# Patient Record
Sex: MALE | Race: WHITE | NOT HISPANIC OR LATINO | Employment: FULL TIME | URBAN - METROPOLITAN AREA
[De-identification: names, ages, dates, MRNs, and addresses within clinical notes are randomized per-mention and may not be internally consistent; named-entity substitution may affect disease eponyms.]

---

## 2017-03-02 ENCOUNTER — TRANSCRIBE ORDERS (OUTPATIENT)
Dept: ADMINISTRATIVE | Facility: HOSPITAL | Age: 47
End: 2017-03-02

## 2017-03-02 ENCOUNTER — APPOINTMENT (OUTPATIENT)
Dept: LAB | Facility: MEDICAL CENTER | Age: 47
End: 2017-03-02
Payer: COMMERCIAL

## 2017-03-02 DIAGNOSIS — I25.10 ATHEROSCLEROSIS OF NATIVE CORONARY ARTERY, ANGINA PRESENCE UNSPECIFIED, UNSPECIFIED WHETHER NATIVE OR TRANSPLANTED HEART: ICD-10-CM

## 2017-03-02 DIAGNOSIS — E78.5 HYPERLIPIDEMIA: ICD-10-CM

## 2017-03-02 DIAGNOSIS — I25.10 ATHEROSCLEROSIS OF NATIVE CORONARY ARTERY, ANGINA PRESENCE UNSPECIFIED, UNSPECIFIED WHETHER NATIVE OR TRANSPLANTED HEART: Primary | ICD-10-CM

## 2017-03-02 DIAGNOSIS — R00.2 PALPITATIONS: ICD-10-CM

## 2017-03-02 DIAGNOSIS — I25.10 ATHEROSCLEROTIC HEART DISEASE OF NATIVE CORONARY ARTERY WITHOUT ANGINA PECTORIS: ICD-10-CM

## 2017-03-02 LAB
ALBUMIN SERPL BCP-MCNC: 4 G/DL (ref 3.5–5)
ALP SERPL-CCNC: 94 U/L (ref 46–116)
ALT SERPL W P-5'-P-CCNC: 45 U/L (ref 12–78)
ANION GAP SERPL CALCULATED.3IONS-SCNC: 6 MMOL/L (ref 4–13)
AST SERPL W P-5'-P-CCNC: 9 U/L (ref 5–45)
BILIRUB SERPL-MCNC: 0.8 MG/DL (ref 0.2–1)
BUN SERPL-MCNC: 21 MG/DL (ref 5–25)
CALCIUM SERPL-MCNC: 8.7 MG/DL (ref 8.3–10.1)
CHLORIDE SERPL-SCNC: 101 MMOL/L (ref 100–108)
CHOLEST SERPL-MCNC: 173 MG/DL (ref 50–200)
CO2 SERPL-SCNC: 29 MMOL/L (ref 21–32)
CREAT SERPL-MCNC: 1.07 MG/DL (ref 0.6–1.3)
GFR SERPL CREATININE-BSD FRML MDRD: >60 ML/MIN/1.73SQ M
GLUCOSE SERPL-MCNC: 271 MG/DL (ref 65–140)
HDLC SERPL-MCNC: 51 MG/DL (ref 40–60)
LDLC SERPL CALC-MCNC: 90 MG/DL (ref 0–100)
POTASSIUM SERPL-SCNC: 4.4 MMOL/L (ref 3.5–5.3)
PROT SERPL-MCNC: 7.6 G/DL (ref 6.4–8.2)
SODIUM SERPL-SCNC: 136 MMOL/L (ref 136–145)
TRIGL SERPL-MCNC: 162 MG/DL

## 2017-03-02 PROCEDURE — 80053 COMPREHEN METABOLIC PANEL: CPT

## 2017-03-02 PROCEDURE — 80061 LIPID PANEL: CPT

## 2017-03-02 PROCEDURE — 36415 COLL VENOUS BLD VENIPUNCTURE: CPT

## 2017-03-17 ENCOUNTER — TRANSCRIBE ORDERS (OUTPATIENT)
Dept: ADMINISTRATIVE | Facility: HOSPITAL | Age: 47
End: 2017-03-17

## 2017-03-17 ENCOUNTER — ALLSCRIPTS OFFICE VISIT (OUTPATIENT)
Dept: OTHER | Facility: OTHER | Age: 47
End: 2017-03-17

## 2017-03-17 DIAGNOSIS — E78.5 HYPERLIPIDEMIA: ICD-10-CM

## 2017-05-01 ENCOUNTER — HOSPITAL ENCOUNTER (OUTPATIENT)
Dept: NON INVASIVE DIAGNOSTICS | Facility: HOSPITAL | Age: 47
Discharge: HOME | End: 2017-05-01
Attending: INTERNAL MEDICINE
Payer: COMMERCIAL

## 2017-05-01 DIAGNOSIS — E78.5 HYPERLIPIDEMIA: ICD-10-CM

## 2017-05-01 PROCEDURE — 93880 EXTRACRANIAL BILAT STUDY: CPT

## 2018-01-13 VITALS
WEIGHT: 244 LBS | DIASTOLIC BLOOD PRESSURE: 80 MMHG | HEIGHT: 74 IN | RESPIRATION RATE: 16 BRPM | BODY MASS INDEX: 31.32 KG/M2 | HEART RATE: 81 BPM | SYSTOLIC BLOOD PRESSURE: 126 MMHG

## 2018-02-02 DIAGNOSIS — I15.8 OTHER SECONDARY HYPERTENSION: ICD-10-CM

## 2018-02-02 DIAGNOSIS — I50.9 CONGESTIVE HEART FAILURE, UNSPECIFIED CONGESTIVE HEART FAILURE CHRONICITY, UNSPECIFIED CONGESTIVE HEART FAILURE TYPE: ICD-10-CM

## 2018-02-02 DIAGNOSIS — E78.2 MIXED HYPERLIPIDEMIA: Primary | ICD-10-CM

## 2018-02-02 RX ORDER — CLOPIDOGREL BISULFATE 75 MG/1
75 TABLET ORAL DAILY
Qty: 90 TABLET | Refills: 0 | Status: SHIPPED | OUTPATIENT
Start: 2018-02-02 | End: 2018-05-31 | Stop reason: SDUPTHER

## 2018-02-02 RX ORDER — CLOPIDOGREL BISULFATE 75 MG/1
1 TABLET ORAL DAILY
COMMUNITY
Start: 2017-08-24 | End: 2018-02-02 | Stop reason: SDUPTHER

## 2018-02-02 RX ORDER — LISINOPRIL 5 MG/1
1 TABLET ORAL DAILY
COMMUNITY
Start: 2017-08-23 | End: 2018-02-02 | Stop reason: SDUPTHER

## 2018-02-02 RX ORDER — ATORVASTATIN CALCIUM 40 MG/1
40 TABLET, FILM COATED ORAL DAILY
Qty: 90 TABLET | Refills: 2 | Status: SHIPPED | OUTPATIENT
Start: 2018-02-02 | End: 2019-01-11 | Stop reason: SDUPTHER

## 2018-02-02 RX ORDER — ATORVASTATIN CALCIUM 40 MG/1
1 TABLET, FILM COATED ORAL DAILY
COMMUNITY
Start: 2017-08-23 | End: 2018-02-02 | Stop reason: SDUPTHER

## 2018-02-02 RX ORDER — LISINOPRIL 5 MG/1
5 TABLET ORAL DAILY
Qty: 90 TABLET | Refills: 3 | Status: SHIPPED | OUTPATIENT
Start: 2018-02-02 | End: 2019-03-29 | Stop reason: SDUPTHER

## 2018-02-21 DIAGNOSIS — I10 ESSENTIAL HYPERTENSION: Primary | ICD-10-CM

## 2018-02-21 RX ORDER — METOPROLOL SUCCINATE 25 MG/1
1 TABLET, EXTENDED RELEASE ORAL DAILY
COMMUNITY
Start: 2017-08-23 | End: 2018-02-21 | Stop reason: SDUPTHER

## 2018-02-21 RX ORDER — METOPROLOL SUCCINATE 25 MG/1
25 TABLET, EXTENDED RELEASE ORAL DAILY
Qty: 90 TABLET | Refills: 2 | Status: SHIPPED | OUTPATIENT
Start: 2018-02-21 | End: 2019-01-12 | Stop reason: SDUPTHER

## 2018-03-06 ENCOUNTER — OFFICE VISIT (OUTPATIENT)
Dept: CARDIOLOGY CLINIC | Facility: MEDICAL CENTER | Age: 48
End: 2018-03-06
Payer: COMMERCIAL

## 2018-03-06 VITALS
BODY MASS INDEX: 28.73 KG/M2 | SYSTOLIC BLOOD PRESSURE: 118 MMHG | HEIGHT: 74 IN | WEIGHT: 223.9 LBS | HEART RATE: 68 BPM | OXYGEN SATURATION: 98 % | DIASTOLIC BLOOD PRESSURE: 66 MMHG

## 2018-03-06 DIAGNOSIS — I10 ESSENTIAL HYPERTENSION: ICD-10-CM

## 2018-03-06 DIAGNOSIS — I25.10 CORONARY ARTERY DISEASE INVOLVING NATIVE CORONARY ARTERY OF NATIVE HEART WITHOUT ANGINA PECTORIS: Primary | ICD-10-CM

## 2018-03-06 PROCEDURE — 99214 OFFICE O/P EST MOD 30 MIN: CPT | Performed by: INTERNAL MEDICINE

## 2018-03-06 NOTE — PATIENT INSTRUCTIONS
Recommendations:  1  Continue current medications  2  Check fasting lipid panel and complete metabolic profile  3  Follow up in one year

## 2018-03-06 NOTE — PROGRESS NOTES
Cardiology   [facility] 52 y o  male MRN: [id number]        Impression:  1  Non ST elevation MI with PCI to Diagonal branch - doing well  2  Dyslipidemia - on statin  3  Palpitations - resolved  Recommendations:  1  Continue current medications  2  Check fasting lipid panel and complete metabolic profile  3  Follow up in one year  HPI: [facility] is a 52y o  year old male with h/o myocardial infarction presents for follow up  No chest pain or shortness of breath  No further palpitations  Has been feeling well  Review of Systems   Constitutional: Negative  HENT: Negative  Eyes: Negative  Respiratory: Negative for chest tightness and shortness of breath  Cardiovascular: Negative for chest pain, palpitations and leg swelling  Gastrointestinal: Negative  Endocrine: Negative  Genitourinary: Negative  Musculoskeletal: Negative  Skin: Negative  Allergic/Immunologic: Negative  Neurological: Negative  Hematological: Negative  Psychiatric/Behavioral: Negative  All other systems reviewed and are negative  Past Medical History:   Diagnosis Date    Chest pain     History of cardiac cath      Past Surgical History:   Procedure Laterality Date    NOSE SURGERY       History   Alcohol Use    Yes     History   Drug Use No     History   Smoking Status    Never Smoker   Smokeless Tobacco    Current User     Family History   Problem Relation Age of Onset    Coronary artery disease Father     Cancer Sister        Allergies:   Allergies   Allergen Reactions    Penicillins        Medications:     Current Outpatient Prescriptions:     aspirin 81 MG tablet, Take 1 tablet by mouth daily, Disp: , Rfl:     atorvastatin (LIPITOR) 40 mg tablet, Take 1 tablet (40 mg total) by mouth daily, Disp: 90 tablet, Rfl: 2    clopidogrel (PLAVIX) 75 mg tablet, Take 1 tablet (75 mg total) by mouth daily, Disp: 90 tablet, Rfl: 0    lisinopril (ZESTRIL) 5 mg tablet, Take 1 tablet (5 mg total) by mouth daily, Disp: 90 tablet, Rfl: 3    metoprolol succinate (TOPROL-XL) 25 mg 24 hr tablet, Take 1 tablet (25 mg total) by mouth daily, Disp: 90 tablet, Rfl: 2      Wt Readings from Last 3 Encounters:   03/06/18 102 kg (223 lb 14 4 oz)   03/17/17 111 kg (244 lb)   07/11/16 111 kg (244 lb 2 oz)     Temp Readings from Last 3 Encounters:   03/23/15 (!) 97 3 °F (36 3 °C)     BP Readings from Last 3 Encounters:   03/06/18 118/66   03/17/17 126/80   07/11/16 120/80     Pulse Readings from Last 3 Encounters:   03/06/18 68   03/17/17 81   07/11/16 58         Physical Exam   Constitutional: He is oriented to person, place, and time  He appears well-developed  HENT:   Head: Normocephalic  Eyes: EOM are normal    Neck: Normal range of motion  Cardiovascular: Normal rate and regular rhythm  Exam reveals no gallop and no friction rub  No murmur heard  Pulmonary/Chest: Effort normal and breath sounds normal  No respiratory distress  He has no wheezes  He has no rales  Abdominal: Soft  Musculoskeletal: Normal range of motion  Neurological: He is alert and oriented to person, place, and time  Skin: Skin is warm and dry  Psychiatric: He has a normal mood and affect           Laboratory Studies:  CMP:  Lab Results   Component Value Date     03/02/2017    K 4 4 03/02/2017     03/02/2017    CO2 29 03/02/2017    ANIONGAP 6 03/02/2017    BUN 21 03/02/2017    CREATININE 1 07 03/02/2017    GLUCOSE 271 (H) 03/02/2017    AST 9 03/02/2017    ALT 45 03/02/2017    BILITOT 0 80 03/02/2017    EGFR >60 0 03/02/2017       Lipid Profile:   Lab Results   Component Value Date    CHOL 173 03/02/2017     Lab Results   Component Value Date    HDL 51 03/02/2017     Lab Results   Component Value Date    LDLCALC 90 03/02/2017     Lab Results   Component Value Date    TRIG 162 (H) 03/02/2017       Cardiac testing:     Results for orders placed in visit on 05/29/14   Echo complete with contrast if indicated    Narrative [address]   Phone: [phone]   Transthoracic Echocardiogram   2D, M-MODE, DOPPLER, AND COLOR DOPPLER   Study date:  29-May-2014   Patient: Tom Herr   MR number: [id number]   Account number: [de-identified]   : 1970   Age: 37 years   Gender: Male   Status: Outpatient   Location: [place] [address]    Height: 74 in   Weight: 219 6 lb   BP: 120/ 75 mmHg   Diagnoses: 414 00 - COR ATH UNSP VSL NTV/GFT   Sonographer:  Naila Mills BS, RDCS, RVT, RDMS   Primary Physician:  Yanet Mccann DO   Referring Physician:  Bette Shirley MD   Group:  [facility]   Interpreting Physician:  Noemi Levin MD   SUMMARY   LEFT VENTRICLE:   Systolic function was normal  Ejection fraction was estimated to be 60 %  There were no regional wall motion abnormalities  Wall thickness was increased  Mass was normal  The changes were consistent with concentric remodeling   (increased wall thickness with normal wall mass)  Left ventricular diastolic function parameters were normal    HISTORY: PRIOR HISTORY: DM, NSTEMI  S/P cardiac cath with coronary artery   stents 2014  PROCEDURE: The study was performed at the [place] [address]  This was a   routine study  The transthoracic approach was used  The study included    complete   2D imaging, M-mode, complete spectral Doppler, and color Doppler  Images were   obtained from the parasternal, apical, subcostal, and suprasternal notch   acoustic windows  Image quality was good  LEFT VENTRICLE: Size was normal  Systolic function was normal  Ejection   fraction was estimated to be 60 %  There were no regional wall motion   abnormalities  Wall thickness was increased  Mass was normal  The changes were   consistent with concentric remodeling (increased wall thickness with normal   wall mass)   DOPPLER: The ratio of early ventricular filling to atrial contraction velocities was within the normal range  Left ventricular diastolic   function parameters were normal    2D, M-MODE, DOPPLER, AND COLOR DOPPLER   MR number: [id number]   Account number: [de-identified]    ------ Page 2   Transthoracic Echocardiogram   RIGHT VENTRICLE: The size was normal  Systolic function was normal    LEFT ATRIUM: The atrium was mildly dilated  RIGHT ATRIUM: Size was normal    MITRAL VALVE: Valve structure was normal  There was normal leaflet separation  DOPPLER: The transmitral velocity was within the normal range  There was no   evidence for stenosis  There was trace regurgitation  AORTIC VALVE: The valve was trileaflet  Leaflets exhibited normal thickness    and   normal cuspal separation  DOPPLER: Transaortic velocity was within the normal   range  There was no evidence for stenosis  There was no regurgitation  TRICUSPID VALVE: The valve structure was normal  There was normal leaflet   separation  DOPPLER: The transtricuspid velocity was within the normal range  There was no evidence for stenosis  There was trace regurgitation  Estimated   peak PA pressure was 25 mmHg  PULMONIC VALVE: Leaflets exhibited normal thickness, no calcification, and   normal cuspal separation  DOPPLER: The transpulmonic velocity was within the   normal range  There was trace regurgitation  PERICARDIUM: There was no pericardial effusion  The pericardium was normal in   appearance  AORTA: The root exhibited normal size     SYSTEM MEASUREMENT TABLES   2D   %FS: 35 11 %   AV Diam: 3 34 cm   EDV(Teich): 97 43 ml   EF Biplane: 66 07 %   EF(Cube): 72 67 %   EF(Teich): 64 48 %   ESV(Cube): 26 63 ml   ESV(Teich): 34 61 ml   IVSd: 1 29 cm   LA Area: 21 27 cm2   LA Diam: 4 45 cm   LVEDV MOD A2C: 105 54 ml   LVEDV MOD A4C: 119 06 ml   LVEDV MOD BP: 112 41 ml   LVEF MOD A2C: 72 35 %   LVEF MOD A4C: 57 31 %   LVESV MOD A2C: 29 18 ml   LVESV MOD A4C: 50 82 ml   2D, M-MODE, DOPPLER, AND COLOR DOPPLER   MR number: [id number]   Account number: [de-identified]    ------ Page 3   Transthoracic Echocardiogram   LVESV MOD BP: 38 14 ml   LVIDd: 4 6 cm   LVIDs: 2 99 cm   LVLd A2C: 9 81 cm   LVLd A4C: 9 72 cm   LVLs A2C: 8 2 cm   LVLs A4C: 8 22 cm   LVPWd: 1 02 cm   RA Area: 19 91 cm2   RV Diam: 3 9 cm   SI(Cube): 31 34 ml/m2   SI(Teich): 27 8 ml/m2   SV MOD A2C: 76 36 ml   SV MOD A4C: 68 23 ml   SV(Cube): 70 83 ml   SV(Teich): 62 82 ml   CW   AV MaxP 91 mmHg   AV Vmax: 1 11 m/s   TR MaxP 41 mmHg   TR Vmax: 2 09 m/s   MM   TAPSE: 2 67 cm   PW   E': 0 11 m/s   LVOT Vmax: 0 94 m/s   LVOT maxPG: 3 51 mmHg   MV A Landon: 0 35 m/s   MV Dec Conway: 2 64 m/s2   MV DecT: 270 66 ms   MV E Landon: 0 72 m/s   MV E/A Ratio: 2 03   [facility] Accredited Echocardiography Laboratory   Prepared and electronically signed by   Denise Haddad MD   Signed [id number] 16:31:04      No results found for this or any previous visit  Results for orders placed in visit on 14   Cardiac catheterization    Narrative         Addendum Date: 2014 4:03:46 PM   The OM1 branch was 75% stenosed pre PCI with 0% residual post    Addendum entered by: Cezar Pearce DO      [address] [facility]   Phone: [phone]      Invasive Cardiovascular Lab Complete Report   INVASIVE CARDIOVASCULAR LAB COMPLETE REPORT         Patient: Amy Bess   MR number: [id number]   Location: [facility] Room:     Account number: [de-identified]   Study date: 2014   Gender: Male   : 1970   Height: 74 in   Weight: 234 1 lb   BSA: 2 33 m squared   Allergies: No Known Allergies   Diagnostic Cardiologist:  Cezar Pearce DO   Interventional Cardiologist:  Cezar Pearce DO   Primary Physician:  Maria G Amin   SUMMARY   CORONARY CIRCULATION:   Mid LAD: There was a 40 % stenosis  1st diagonal: There was a diffuse 90 % stenosis  It does not appear amenable    to   intervention  Distal circumflex: There was a 70 % stenosis  Distal RCA: There was a 50 % stenosis  CARDIAC STRUCTURES:   Global left ventricular function was normal    1ST LESION INTERVENTIONS:   A drug-eluting stent procedure was performed on the lesion in the 1st obtuse   marginal  A Xience Xpedition Rx 3 0 x 12mm drug-eluting stent was placed    across   the lesion and deployed at a maximum inflation pressure of 14 levi  INDICATIONS:   --  Possible CAD: myocardial infarction without ST elevation (NSTEMI)  PROCEDURES PERFORMED   --  Left coronary angiography  --  Right coronary angiography  --  Inpatient  --  Coronary Catheterization (w/ LHC)  --  Coronary Drug Eluting Stent w/PTCA  --  Intervention on OM1: drug-eluting stent  INVASIVE CARDIOVASCULAR LAB COMPLETE REPORT   MR number: [id number]   Account number: [de-identified]    ------ Page 2   PROCEDURE: The risks and alternatives of the procedures and conscious sedation   were explained to the patient and informed consent was obtained  The patient   was brought to the cath lab and placed on the table  The planned puncture    sites   were prepped and draped in the usual sterile fashion  Oxygen 4 L/min  --  Right radial artery access  After performing an Artie's test to verify   adequate ulnar artery supply to the hand, the radial site was prepped  The   puncture site was infiltrated with local anesthetic  The vessel was accessed   using the modified Seldinger technique, a wire was advanced into the vessel,   and a sheath was advanced over the wire into the vessel  --  Left coronary artery angiography  A catheter was advanced over a guidewire   into the aorta and positioned in the left coronary artery ostium under   fluoroscopic guidance  Angiography was performed  --  Right coronary artery angiography  A catheter was advanced over a    guidewire   into the aorta and positioned in the right coronary artery ostium under   fluoroscopic guidance   Angiography was performed  --  Inpatient  --  Coronary Catheterization (w/ [facility])  LESION INTERVENTION: A drug-eluting stent procedure was performed on the    lesion   in the 1st obtuse marginal    --  Vessel setup was performed  A Runway 6Fr Cls4 0 guiding catheter was used   to cannulate the vessel  --  Vessel setup was performed  A BMW   014 190cm wire was used to cross the   lesion  --  A Xience Xpedition Rx 3 0 x 12mm drug-eluting stent was placed across the   lesion and deployed at a maximum inflation pressure of 14 levi  INTERVENTIONS:   --  Coronary Drug Eluting Stent w/PTCA  PROCEDURE COMPLETION: The patient tolerated the procedure well and was   discharged from the cath lab  TIMING: Test started at 09:21  Test concluded at   10:03  HEMOSTASIS: The sheath was removed  The site was compressed with a   Hemoband device  Hemostasis was obtained  MEDICATIONS GIVEN: Verapamil   (Isoptin, Calan, Covera), 2 5 mg, IA, at 09:30  Baby Aspirin, 243 mg, PO, at   09:25  Fentanyl (1OOmcg/2 ml), 50 mcg, IV, at 09:25  Versed (2mg/2ml), 2 mg,   IV, at 09:25  Versed (2mg/2ml), 1 mg, IV, at 09:29  Fentanyl (1OOmcg/2 ml), 25   mcg, IV, at 09:29  1% Lidocaine, 1 ml, subcutaneously, at 09:29  Angiomax   Bolus(250mg/50ml), 15 8 ml, IV, at 09:43  Angiomax Drip (250mg/50ml), infusion   rate of 37 ml/hr, IV, at 09:43  Effient, 60 mg, PO, at 09:53  Angiomax Drip   (250mg/50ml), infusion rate of 0 ml/hr, IV, at 09:53  CONTRAST GIVEN: 21 ml   INVASIVE CARDIOVASCULAR LAB COMPLETE REPORT   MR number: [id number]   Account number: [de-identified]    ------ Page 3   Omnipaque (350mg I /ml)  120 ml Omnipaque (350 mg I /ml)  RADIATION EXPOSURE:   Fluoroscopy time: 5 46 min  VENTRICLES:   --  There were no left ventricular global or regional wall    motion   abnormalities  Global left ventricular function was normal    CORONARY VESSELS:   --  The coronary circulation is right dominant  --  Mid LAD: There was a 40 % stenosis     --  1st diagonal: The vessel was very small sized  There was a diffuse 90 %   stenosis  It does not appear amenable to intervention  --  Distal circumflex:   The vessel was very small sized  There was a 70 % stenosis  --  1st obtuse marginal:   --  Distal RCA: There was a 50 % stenosis  IMPRESSIONS:   PCI of OM branch with residual small branch vessel CAD too small for    meaningful   PCI  plan will be asa, effient x 1 year, statin, beta blocker  Prepared and signed by   Courtney Tovar DO   Signed 2014 12:30:35   Study diagram   Angiographic findings   Native coronary lesions:   ï¿½Mid LAD: Lesion 1: 40 % stenosis  ï¿½D1: Lesion 1: diffuse, 90 % stenosis  ï¿½Distal circumflex: Lesion 1: 70 % stenosis  ï¿½Distal RCA: Lesion 1: 50 % stenosis  Intervention results   Native coronary lesions:   ï¿½ drug-eluting stent of OM1  Stent: Xience Xpedition Rx 3 0 x 12mm   drug-eluting     Hemodynamic tables   Pressures:  Baseline   Pressures:  - HR: 63   Pressures:  - Rhythm:   Pressures:  -- Aortic Pressure (S/D/M): 100/69/81   Pressures:  -- Left Ventricle (s/edp): 105/21/--   Outputs:  Baseline   Outputs:  -- CALCULATIONS: [age]   Outputs:  -- CALCULATIONS: Body Surface Area: 2 33   Outputs:  -- CALCULATIONS: Height in cm: 188 00   Outputs:  -- CALCULATIONS: Sex: Male   Outputs:  -- CALCULATIONS: Weight in k 40   INVASIVE CARDIOVASCULAR LAB COMPLETE REPORT   MR number: [id number]   Account number: [de-identified]    ------ Page 4

## 2018-05-31 DIAGNOSIS — I50.9 CONGESTIVE HEART FAILURE (HCC): ICD-10-CM

## 2018-06-01 RX ORDER — CLOPIDOGREL BISULFATE 75 MG/1
TABLET ORAL
Qty: 90 TABLET | Refills: 0 | Status: SHIPPED | OUTPATIENT
Start: 2018-06-01 | End: 2018-09-06 | Stop reason: SDUPTHER

## 2018-09-06 DIAGNOSIS — I50.9 CONGESTIVE HEART FAILURE (HCC): ICD-10-CM

## 2018-09-06 RX ORDER — CLOPIDOGREL BISULFATE 75 MG/1
TABLET ORAL
Qty: 90 TABLET | Refills: 0 | Status: SHIPPED | OUTPATIENT
Start: 2018-09-06 | End: 2018-10-24 | Stop reason: SDUPTHER

## 2018-10-24 DIAGNOSIS — E78.5 DYSLIPIDEMIA: Primary | ICD-10-CM

## 2018-10-24 RX ORDER — CLOPIDOGREL BISULFATE 75 MG/1
75 TABLET ORAL DAILY
Qty: 90 TABLET | Refills: 3 | Status: SHIPPED | OUTPATIENT
Start: 2018-10-24 | End: 2019-03-29 | Stop reason: SDUPTHER

## 2019-01-11 DIAGNOSIS — E78.2 MIXED HYPERLIPIDEMIA: ICD-10-CM

## 2019-01-11 RX ORDER — ATORVASTATIN CALCIUM 40 MG/1
TABLET, FILM COATED ORAL
Qty: 90 TABLET | Refills: 2 | Status: SHIPPED | OUTPATIENT
Start: 2019-01-11 | End: 2020-01-27

## 2019-01-12 DIAGNOSIS — I10 ESSENTIAL HYPERTENSION: ICD-10-CM

## 2019-01-12 RX ORDER — METOPROLOL SUCCINATE 25 MG/1
TABLET, EXTENDED RELEASE ORAL
Qty: 90 TABLET | Refills: 2 | Status: SHIPPED | OUTPATIENT
Start: 2019-01-12 | End: 2019-03-29 | Stop reason: SDUPTHER

## 2019-03-29 ENCOUNTER — OFFICE VISIT (OUTPATIENT)
Dept: CARDIOLOGY CLINIC | Facility: MEDICAL CENTER | Age: 49
End: 2019-03-29
Payer: COMMERCIAL

## 2019-03-29 VITALS
DIASTOLIC BLOOD PRESSURE: 64 MMHG | BODY MASS INDEX: 28.4 KG/M2 | WEIGHT: 221.3 LBS | OXYGEN SATURATION: 96 % | SYSTOLIC BLOOD PRESSURE: 112 MMHG | HEIGHT: 74 IN | HEART RATE: 65 BPM

## 2019-03-29 DIAGNOSIS — E78.5 DYSLIPIDEMIA: ICD-10-CM

## 2019-03-29 DIAGNOSIS — I25.10 CORONARY ARTERY DISEASE INVOLVING NATIVE CORONARY ARTERY WITHOUT ANGINA PECTORIS, UNSPECIFIED WHETHER NATIVE OR TRANSPLANTED HEART: Primary | ICD-10-CM

## 2019-03-29 DIAGNOSIS — I15.8 OTHER SECONDARY HYPERTENSION: ICD-10-CM

## 2019-03-29 DIAGNOSIS — I10 ESSENTIAL HYPERTENSION: ICD-10-CM

## 2019-03-29 PROCEDURE — 93000 ELECTROCARDIOGRAM COMPLETE: CPT | Performed by: INTERNAL MEDICINE

## 2019-03-29 PROCEDURE — 99214 OFFICE O/P EST MOD 30 MIN: CPT | Performed by: INTERNAL MEDICINE

## 2019-03-29 RX ORDER — METOPROLOL SUCCINATE 25 MG/1
25 TABLET, EXTENDED RELEASE ORAL DAILY
Qty: 90 TABLET | Refills: 2 | Status: SHIPPED | OUTPATIENT
Start: 2019-03-29 | End: 2020-01-27

## 2019-03-29 RX ORDER — CLOPIDOGREL BISULFATE 75 MG/1
75 TABLET ORAL DAILY
Qty: 90 TABLET | Refills: 3 | Status: SHIPPED | OUTPATIENT
Start: 2019-03-29 | End: 2020-05-21

## 2019-03-29 RX ORDER — LISINOPRIL 5 MG/1
5 TABLET ORAL DAILY
Qty: 90 TABLET | Refills: 3 | Status: SHIPPED | OUTPATIENT
Start: 2019-03-29 | End: 2020-05-21

## 2019-03-29 NOTE — PROGRESS NOTES
Cardiology   [facility] 50 y o  male MRN: [id number]        Impression:  1  Non ST elevation MI with PCI to Diagonal branch - doing well  2  Dyslipidemia - on statin  3  Palpitations - resolved         Recommendations:  1  Continue current medications  2  Check fasting lipid panel and complete metabolic profile  3  Follow up in one year  HPI: [facility] is a 50y o  year old male with h/o myocardial infarction presents for follow up  No chest pain or shortness of breath  No further palpitations  Has been feeling well  Review of Systems      Past Medical History:   Diagnosis Date    Chest pain     History of cardiac cath      Past Surgical History:   Procedure Laterality Date    NOSE SURGERY       Social History     Substance and Sexual Activity   Alcohol Use Yes     Social History     Substance and Sexual Activity   Drug Use No     Social History     Tobacco Use   Smoking Status Never Smoker   Smokeless Tobacco Current User     Family History   Problem Relation Age of Onset    Coronary artery disease Father     Cancer Sister        Allergies:   Allergies   Allergen Reactions    Penicillins        Medications:     Current Outpatient Medications:     aspirin 81 MG tablet, Take 1 tablet by mouth daily, Disp: , Rfl:     atorvastatin (LIPITOR) 40 mg tablet, TAKE 1 TABLET BY MOUTH EVERY DAY, Disp: 90 tablet, Rfl: 2    clopidogrel (PLAVIX) 75 mg tablet, Take 1 tablet (75 mg total) by mouth daily, Disp: 90 tablet, Rfl: 3    lisinopril (ZESTRIL) 5 mg tablet, Take 1 tablet (5 mg total) by mouth daily, Disp: 90 tablet, Rfl: 3    metoprolol succinate (TOPROL-XL) 25 mg 24 hr tablet, TAKE 1 TABLET BY MOUTH EVERY DAY, Disp: 90 tablet, Rfl: 2      Wt Readings from Last 3 Encounters:   03/29/19 100 kg (221 lb 4 8 oz)   03/06/18 102 kg (223 lb 14 4 oz)   03/17/17 111 kg (244 lb)     Temp Readings from Last 3 Encounters:   03/23/15 (!) 97 3 °F (36 3 °C)     BP Readings from Last 3 Encounters:   03/29/19 112/64   03/06/18 118/66   03/17/17 126/80     Pulse Readings from Last 3 Encounters:   03/29/19 65   03/06/18 68   03/17/17 81         Physical Exam      Laboratory Studies:  CMP:  Lab Results   Component Value Date     05/15/2014    K 4 4 03/02/2017     03/02/2017    CO2 29 03/02/2017    ANIONGAP 9 05/15/2014    BUN 21 03/02/2017    CREATININE 1 07 03/02/2017    GLUCOSE 103 05/15/2014    AST 9 03/02/2017    ALT 45 03/02/2017    BILITOT 0 8 04/05/2014    EGFR >60 0 03/02/2017       Lipid Profile:   Lab Results   Component Value Date    CHOL 157 04/07/2014     Lab Results   Component Value Date    HDL 51 03/02/2017     Lab Results   Component Value Date    LDLCALC 90 03/02/2017     Lab Results   Component Value Date    TRIG 162 (H) 03/02/2017       Cardiac testing:   EKG reviewed personally: SENG Calvillo

## 2020-01-26 DIAGNOSIS — I10 ESSENTIAL HYPERTENSION: ICD-10-CM

## 2020-01-26 DIAGNOSIS — E78.2 MIXED HYPERLIPIDEMIA: ICD-10-CM

## 2020-01-27 RX ORDER — METOPROLOL SUCCINATE 25 MG/1
TABLET, EXTENDED RELEASE ORAL
Qty: 90 TABLET | Refills: 0 | Status: SHIPPED | OUTPATIENT
Start: 2020-01-27 | End: 2020-06-01 | Stop reason: SDUPTHER

## 2020-01-27 RX ORDER — ATORVASTATIN CALCIUM 40 MG/1
TABLET, FILM COATED ORAL
Qty: 90 TABLET | Refills: 0 | Status: SHIPPED | OUTPATIENT
Start: 2020-01-27 | End: 2020-05-13 | Stop reason: SDUPTHER

## 2020-02-13 ENCOUNTER — OFFICE VISIT (OUTPATIENT)
Dept: OBGYN CLINIC | Facility: MEDICAL CENTER | Age: 50
End: 2020-02-13
Payer: COMMERCIAL

## 2020-02-13 ENCOUNTER — APPOINTMENT (OUTPATIENT)
Dept: RADIOLOGY | Facility: MEDICAL CENTER | Age: 50
End: 2020-02-13
Payer: COMMERCIAL

## 2020-02-13 VITALS
HEIGHT: 74 IN | HEART RATE: 90 BPM | WEIGHT: 220 LBS | SYSTOLIC BLOOD PRESSURE: 125 MMHG | BODY MASS INDEX: 28.23 KG/M2 | DIASTOLIC BLOOD PRESSURE: 82 MMHG

## 2020-02-13 DIAGNOSIS — M25.562 ACUTE PAIN OF LEFT KNEE: ICD-10-CM

## 2020-02-13 DIAGNOSIS — S82.092A OTHER CLOSED FRACTURE OF LEFT PATELLA, INITIAL ENCOUNTER: Primary | ICD-10-CM

## 2020-02-13 PROBLEM — S82.002A CLOSED FRACTURE OF LEFT PATELLA: Status: ACTIVE | Noted: 2020-02-13

## 2020-02-13 PROCEDURE — 99243 OFF/OP CNSLTJ NEW/EST LOW 30: CPT | Performed by: PHYSICAL MEDICINE & REHABILITATION

## 2020-02-13 PROCEDURE — 73564 X-RAY EXAM KNEE 4 OR MORE: CPT

## 2020-02-13 PROCEDURE — 27520 TREAT KNEECAP FRACTURE: CPT | Performed by: PHYSICAL MEDICINE & REHABILITATION

## 2020-02-13 NOTE — PROGRESS NOTES
1  Other closed fracture of left patella, initial encounter     2  Acute pain of left knee  XR knee 4+ vw left injury     Orders Placed This Encounter   Procedures    Fracture / Dislocation Treatment    XR knee 4+ vw left injury        Imaging Studies (I personally reviewed images in PACS and report):  Left knee x-rays dated 2/13/2020  These images show what is severe enthesopathy with fragmentation at the distal pole of the patella  He also has what appears to be a large radiodensity in the tibia  There is no joint effusion  Impression:  Left knee pain likely secondary to severe enthesopathy with fragmentation at the distal pole of the patella  He is able to extend his knee against gravity but is limited against resistance due to pain  We decided to place him in a T ROM brace locked in extension to allow approximation of the fracture fragments for healing  I will see him back in 2 weeks to reassess  If he is doing well, we could consider giving him 15-20 degrees of flexion  We will likely start physical therapy in about 2-4 weeks  Return in about 2 weeks (around 2/27/2020)  HPI:  Ginette Barnard is a 52 y o  male  who presents for evaluation of   Chief Complaint   Patient presents with    Left Knee - Pain       Onset/Mechanism: Chronic pain that he has had for many years that worsened on 2/12/2020 while he was playing basketball  He twisted his knee  Location: In the front of his knee  Radiation: Denies  Quality: Constant burning  Provocative: Putting weight on it  Severity: Severe since it just happened  Associated Symptoms: Trouble walking  A little bit of swelling as well  Treatment so far: Ice, compression wrap which helps a little bit  Review of Systems   Constitutional: Positive for activity change  Negative for fever  HENT: Negative for sore throat  Eyes: Negative for visual disturbance  Respiratory: Negative for shortness of breath      Cardiovascular: Negative for chest pain    Gastrointestinal: Negative for abdominal pain  Endocrine: Negative for polydipsia  Genitourinary: Negative for difficulty urinating  Musculoskeletal: Positive for arthralgias  Skin: Negative for rash  Allergic/Immunologic: Negative for immunocompromised state  Neurological: Negative for numbness  Hematological: Does not bruise/bleed easily  Psychiatric/Behavioral: Negative for confusion  Following history reviewed and updated:  Past Medical History:   Diagnosis Date    Chest pain     History of cardiac cath      Past Surgical History:   Procedure Laterality Date    NOSE SURGERY       Social History   Social History     Substance and Sexual Activity   Alcohol Use Yes    Comment: socail     Social History     Substance and Sexual Activity   Drug Use No     Social History     Tobacco Use   Smoking Status Never Smoker   Smokeless Tobacco Current User    Types: Chew     Family History   Problem Relation Age of Onset    Coronary artery disease Father     Cancer Sister      Allergies   Allergen Reactions    Penicillins         Constitutional:  /82 (BP Location: Right arm, Patient Position: Sitting, Cuff Size: Standard)   Pulse 90   Ht 6' 2" (1 88 m)   Wt 99 8 kg (220 lb)   BMI 28 25 kg/m²    General: NAD  Eyes: Anicteric sclerae  Neck: Supple  Lungs: Unlabored breathing  Cardiovascular: No lower extremity edema  Skin: Intact without erythema  Neurologic: Sensation intact to light touch  Psychiatric: Mood and affect are appropriate  Left Knee Exam     Tenderness   The patient is experiencing tenderness in the patella and patellar tendon  Range of Motion   Extension: 5   Flexion: abnormal     Tests   Patellar apprehension: positive    Other   Erythema: absent  Scars: absent  Sensation: normal  Pulse: present  Swelling: none  Effusion: no effusion present    Comments:  He is able to extend his knee against gravity but is limited again strength due to pain    No extensor deficit  He is warm and well-perfused in the distal extremity  His compartments are all soft and compressible  There is no joint effusion  Sensation to light touch is intact throughout  Fracture / Dislocation Treatment  Date/Time: 2/13/2020 12:25 PM  Performed by: Terry Shepherd DO  Authorized by: Terry Shepherd DO     Patient Location:  Clinic  Other Assisting Provider: Yes (comment)    Verbal consent obtained?: Yes    Risks and benefits: Risks, benefits and alternatives were discussed    Consent given by:  Patient  Patient states understanding of procedure being performed: Yes    Relevant documents present and verified: Yes    Radiology Images displayed and confirmed  If images not available, report reviewed: Yes    Patient identity confirmed:  Verbally with patient  Injury location:  Knee  Location details:  Left knee  Injury type:  Fracture  Fracture type: patellar    Neurovascular status: Neurovascularly intact    Distal perfusion: normal    Neurological function: normal    Range of motion: reduced    Local anesthesia used?: No    Immobilization: Total range-of-motion brace locked in extension  Neurovascular status: Neurovascularly intact    Distal perfusion: normal    Neurological function: normal    Range of motion: unchanged    Patient tolerance:  Patient tolerated the procedure well with no immediate complications         This document was recorded using voice recognition software and errors may be noted

## 2020-02-13 NOTE — LETTER
February 13, 2020     DO Sofia Ontiveros [id number]    Patient: Amy Contreras   YOB: 1970   Date of Visit: 2/13/2020       Dear Dr Stanford Agent: Thank you for referring Amy Contreras to me for evaluation  Below are my notes for this consultation  If you have questions, please do not hesitate to call me  I look forward to following your patient along with you  Sincerely,        Eleni Cox DO        CC: No Recipients  DO Cindy  2/13/2020 12:27 PM  Signed  1  Other closed fracture of left patella, initial encounter     2  Acute pain of left knee  XR knee 4+ vw left injury     Orders Placed This Encounter   Procedures    Fracture / Dislocation Treatment    XR knee 4+ vw left injury        Imaging Studies (I personally reviewed images in PACS and report):  Left knee x-rays dated 2/13/2020  These images show what is severe enthesopathy with fragmentation at the distal pole of the patella  He also has what appears to be a large radiodensity in the tibia  There is no joint effusion  Impression:  Left knee pain likely secondary to severe enthesopathy with fragmentation at the distal pole of the patella  He is able to extend his knee against gravity but is limited against resistance due to pain  We decided to place him in a T ROM brace locked in extension to allow approximation of the fracture fragments for healing  I will see him back in 2 weeks to reassess  If he is doing well, we could consider giving him 15-20 degrees of flexion  We will likely start physical therapy in about 2-4 weeks  Return in about 2 weeks (around 2/27/2020)  HPI:  Amy Contreras is a 52 y o  male  who presents for evaluation of   Chief Complaint   Patient presents with    Left Knee - Pain       Onset/Mechanism: Chronic pain that he has had for many years that worsened on 2/12/2020 while he was playing basketball  He twisted his knee    Location: In the front of his knee   Radiation: Denies  Quality: Constant burning  Provocative: Putting weight on it  Severity: Severe since it just happened  Associated Symptoms: Trouble walking  A little bit of swelling as well  Treatment so far: Ice, compression wrap which helps a little bit  Review of Systems   Constitutional: Positive for activity change  Negative for fever  HENT: Negative for sore throat  Eyes: Negative for visual disturbance  Respiratory: Negative for shortness of breath  Cardiovascular: Negative for chest pain  Gastrointestinal: Negative for abdominal pain  Endocrine: Negative for polydipsia  Genitourinary: Negative for difficulty urinating  Musculoskeletal: Positive for arthralgias  Skin: Negative for rash  Allergic/Immunologic: Negative for immunocompromised state  Neurological: Negative for numbness  Hematological: Does not bruise/bleed easily  Psychiatric/Behavioral: Negative for confusion  Following history reviewed and updated:  Past Medical History:   Diagnosis Date    Chest pain     History of cardiac cath      Past Surgical History:   Procedure Laterality Date    NOSE SURGERY       Social History   Social History     Substance and Sexual Activity   Alcohol Use Yes    Comment: socail     Social History     Substance and Sexual Activity   Drug Use No     Social History     Tobacco Use   Smoking Status Never Smoker   Smokeless Tobacco Current User    Types: Chew     Family History   Problem Relation Age of Onset    Coronary artery disease Father     Cancer Sister      Allergies   Allergen Reactions    Penicillins         Constitutional:  /82 (BP Location: Right arm, Patient Position: Sitting, Cuff Size: Standard)   Pulse 90   Ht 6' 2" (1 88 m)   Wt 99 8 kg (220 lb)   BMI 28 25 kg/m²     General: NAD  Eyes: Anicteric sclerae  Neck: Supple  Lungs: Unlabored breathing  Cardiovascular: No lower extremity edema  Skin: Intact without erythema    Neurologic: Sensation intact to light touch  Psychiatric: Mood and affect are appropriate  Left Knee Exam     Tenderness   The patient is experiencing tenderness in the patella and patellar tendon  Range of Motion   Extension: 5   Flexion: abnormal     Tests   Patellar apprehension: positive    Other   Erythema: absent  Scars: absent  Sensation: normal  Pulse: present  Swelling: none  Effusion: no effusion present    Comments:  He is able to extend his knee against gravity but is limited again strength due to pain  No extensor deficit  He is warm and well-perfused in the distal extremity  His compartments are all soft and compressible  There is no joint effusion  Sensation to light touch is intact throughout  Fracture / Dislocation Treatment  Date/Time: 2/13/2020 12:25 PM  Performed by: Shirleen Eisenmenger, DO  Authorized by: Shirleen Eisenmenger, DO     Patient Location:  Clinic  Other Assisting Provider: Yes (comment)    Verbal consent obtained?: Yes    Risks and benefits: Risks, benefits and alternatives were discussed    Consent given by:  Patient  Patient states understanding of procedure being performed: Yes    Relevant documents present and verified: Yes    Radiology Images displayed and confirmed  If images not available, report reviewed: Yes    Patient identity confirmed:  Verbally with patient  Injury location:  Knee  Location details:  Left knee  Injury type:  Fracture  Fracture type: patellar    Neurovascular status: Neurovascularly intact    Distal perfusion: normal    Neurological function: normal    Range of motion: reduced    Local anesthesia used?: No    Immobilization: Total range-of-motion brace locked in extension    Neurovascular status: Neurovascularly intact    Distal perfusion: normal    Neurological function: normal    Range of motion: unchanged    Patient tolerance:  Patient tolerated the procedure well with no immediate complications         This document was recorded using voice recognition software and errors may be noted

## 2020-02-26 ENCOUNTER — OFFICE VISIT (OUTPATIENT)
Dept: OBGYN CLINIC | Facility: CLINIC | Age: 50
End: 2020-02-26

## 2020-02-26 VITALS
HEART RATE: 99 BPM | SYSTOLIC BLOOD PRESSURE: 138 MMHG | WEIGHT: 220 LBS | HEIGHT: 74 IN | DIASTOLIC BLOOD PRESSURE: 93 MMHG | BODY MASS INDEX: 28.23 KG/M2

## 2020-02-26 DIAGNOSIS — M25.562 ACUTE PAIN OF LEFT KNEE: ICD-10-CM

## 2020-02-26 DIAGNOSIS — S82.092D OTHER CLOSED FRACTURE OF LEFT PATELLA WITH ROUTINE HEALING, SUBSEQUENT ENCOUNTER: Primary | ICD-10-CM

## 2020-02-26 PROCEDURE — 99024 POSTOP FOLLOW-UP VISIT: CPT | Performed by: PHYSICAL MEDICINE & REHABILITATION

## 2020-02-26 NOTE — PROGRESS NOTES
1  Other closed fracture of left patella with routine healing, subsequent encounter  MRI knee left  wo contrast   2  Acute pain of left knee  MRI knee left  wo contrast     Orders Placed This Encounter   Procedures    MRI knee left  wo contrast        Imaging Studies (I personally reviewed images in PACS and report):  Left knee x-rays dated 2/13/2020  These images show what is severe enthesopathy with fragmentation at the distal pole of the patella  He also has what appears to be a large radiodensity in the tibia  There is no joint effusion      Impression:  Patient is here in follow-up of left knee pain likely secondary to severe enthesopathy with fragmentation at the distal pole of the patella  He remains able to extend his knee against gravity and has slightly increased strength against resistance compared to his initial visit  He will remain in the T ROM brace which will remain in extension  In light of him having that large sclerotic lesion in the tibia, we will obtain an MRI to see what it is  I did not repeat x-rays since we will be getting the MRI  Will see him back after the MRI  No follow-ups on file  HPI:  Dru Langley is a 52 y o  male  who presents in follow up  Here for   Chief Complaint   Patient presents with    Follow-up       Date of injury: Chronic pain that he has had for many years that worsened on 2/12/2020 while he was playing basketball  He twisted his knee  Trajectory of symptoms:  He feels about 40% improved since his last visit  Review of Systems   Constitutional: Positive for activity change  Negative for fever  HENT: Negative for sore throat  Eyes: Negative for visual disturbance  Respiratory: Negative for shortness of breath  Cardiovascular: Negative for chest pain  Gastrointestinal: Negative for abdominal pain  Endocrine: Negative for polydipsia  Genitourinary: Negative for difficulty urinating  Musculoskeletal: Positive for arthralgias  Skin: Negative for rash  Allergic/Immunologic: Negative for immunocompromised state  Neurological: Negative for numbness  Hematological: Does not bruise/bleed easily  Psychiatric/Behavioral: Negative for confusion  Following history reviewed and updated:  Past Medical History:   Diagnosis Date    Chest pain     History of cardiac cath      Past Surgical History:   Procedure Laterality Date    NOSE SURGERY       Social History   Social History     Substance and Sexual Activity   Alcohol Use Yes    Comment: socail     Social History     Substance and Sexual Activity   Drug Use No     Social History     Tobacco Use   Smoking Status Never Smoker   Smokeless Tobacco Current User    Types: Chew     Family History   Problem Relation Age of Onset    Coronary artery disease Father     Cancer Sister      Allergies   Allergen Reactions    Penicillins         Constitutional:  /93 (BP Location: Left arm, Patient Position: Sitting, Cuff Size: Standard)   Pulse 99   Ht 6' 2" (1 88 m)   Wt 99 8 kg (220 lb)   BMI 28 25 kg/m²    General: NAD  Eyes: Clear sclerae  ENT: No inflammation, lesion, or mass of lips  No tracheal deviation  Musculoskeletal: As mentioned below  Integumentary: No visible rashes or skin lesions  Pulmonary/Chest: Effort normal  No respiratory distress  Neuro: CN's grossly intact, STOCKTON  Psych: Normal affect and judgement  Vascular: WWP  Left Knee Exam     Tenderness   The patient is experiencing tenderness in the patella  Range of Motion   Extension: normal   Flexion: abnormal     Other   Erythema: absent  Scars: absent  Sensation: normal  Pulse: present  Swelling: mild  Effusion: effusion present             Procedures - none for this visit

## 2020-03-08 ENCOUNTER — HOSPITAL ENCOUNTER (OUTPATIENT)
Dept: RADIOLOGY | Facility: HOSPITAL | Age: 50
Discharge: HOME | End: 2020-03-08
Attending: PHYSICAL MEDICINE & REHABILITATION
Payer: COMMERCIAL

## 2020-03-08 DIAGNOSIS — M25.562 ACUTE PAIN OF LEFT KNEE: ICD-10-CM

## 2020-03-08 DIAGNOSIS — S82.092D OTHER CLOSED FRACTURE OF LEFT PATELLA WITH ROUTINE HEALING, SUBSEQUENT ENCOUNTER: ICD-10-CM

## 2020-03-08 PROCEDURE — 73721 MRI JNT OF LWR EXTRE W/O DYE: CPT

## 2020-03-16 ENCOUNTER — TELEPHONE (OUTPATIENT)
Dept: OBGYN CLINIC | Facility: CLINIC | Age: 50
End: 2020-03-16

## 2020-03-17 ENCOUNTER — OFFICE VISIT (OUTPATIENT)
Dept: OBGYN CLINIC | Facility: CLINIC | Age: 50
End: 2020-03-17

## 2020-03-17 VITALS
HEART RATE: 91 BPM | WEIGHT: 220 LBS | DIASTOLIC BLOOD PRESSURE: 82 MMHG | HEIGHT: 74 IN | BODY MASS INDEX: 28.23 KG/M2 | SYSTOLIC BLOOD PRESSURE: 115 MMHG

## 2020-03-17 DIAGNOSIS — S82.092D OTHER CLOSED FRACTURE OF LEFT PATELLA WITH ROUTINE HEALING, SUBSEQUENT ENCOUNTER: Primary | ICD-10-CM

## 2020-03-17 DIAGNOSIS — M25.562 ACUTE PAIN OF LEFT KNEE: ICD-10-CM

## 2020-03-17 PROCEDURE — 99024 POSTOP FOLLOW-UP VISIT: CPT | Performed by: PHYSICAL MEDICINE & REHABILITATION

## 2020-03-17 NOTE — PROGRESS NOTES
1  Other closed fracture of left patella with routine healing, subsequent encounter  Ambulatory referral to Physical Therapy   2  Acute pain of left knee  Ambulatory referral to Physical Therapy     Orders Placed This Encounter   Procedures    Ambulatory referral to Physical Therapy        Imaging Studies (I personally reviewed images in PACS and report):  Left knee x-rays dated 2/13/2020  These images show what is severe enthesopathy with fragmentation at the distal pole of the patella  He also has what appears to be a large radiodensity in the tibia  There is no joint effusion      Impression:  Patient is here in follow-up of left knee pain likely secondary to severe enthesopathy with fragmentation at the distal pole of the patella with date of injury as 2/12/2020  He has done quite well since his last visit and has no pain with ambulation  We decided to get him out of the T ROM brace but he will avoid any knee flexion past 90°  We will also start physical therapy for him  On his plain films, there was a large sclerotic lesion in the tibia but this was not seen on MRI  On his next visit, we will obtain x-rays of his knee along with proximal tibia/fibula to reassess this  I will see him back after he has had 3 weeks of physical therapy  No follow-ups on file  HPI:  Kannan Foss is a 52 y o  male  who presents in follow up  Here for   Chief Complaint   Patient presents with    Follow-up     MRI Review       Date of injury: Chronic pain that he has had for many years that worsened on 2/12/2020 while he was playing basketball  He twisted his knee  Trajectory of symptoms:  He feels about 40% improved since his last visit  Review of Systems   Constitutional: Positive for activity change  Negative for fever  HENT: Negative for sore throat  Eyes: Negative for visual disturbance  Respiratory: Negative for shortness of breath  Cardiovascular: Negative for chest pain     Gastrointestinal: Negative for abdominal pain  Endocrine: Negative for polydipsia  Genitourinary: Negative for difficulty urinating  Musculoskeletal: Positive for arthralgias  Skin: Negative for rash  Allergic/Immunologic: Negative for immunocompromised state  Neurological: Negative for numbness  Hematological: Does not bruise/bleed easily  Psychiatric/Behavioral: Negative for confusion  Following history reviewed and updated:  Past Medical History:   Diagnosis Date    Chest pain     History of cardiac cath      Past Surgical History:   Procedure Laterality Date    NOSE SURGERY       Social History   Social History     Substance and Sexual Activity   Alcohol Use Yes    Comment: socail     Social History     Substance and Sexual Activity   Drug Use No     Social History     Tobacco Use   Smoking Status Never Smoker   Smokeless Tobacco Current User    Types: Chew     Family History   Problem Relation Age of Onset    Coronary artery disease Father     Cancer Sister      Allergies   Allergen Reactions    Penicillins         Constitutional:  /82   Pulse 91   Ht 6' 2" (1 88 m)   Wt 99 8 kg (220 lb)   BMI 28 25 kg/m²    General: NAD  Eyes: Clear sclerae  ENT: No inflammation, lesion, or mass of lips  No tracheal deviation  Musculoskeletal: As mentioned below  Integumentary: No visible rashes or skin lesions  Pulmonary/Chest: Effort normal  No respiratory distress  Neuro: CN's grossly intact, STOCKTON  Psych: Normal affect and judgement  Vascular: WWP  Left Knee Exam     Tenderness   The patient is experiencing tenderness in the patellar tendon  Range of Motion   Extension: normal   Flexion: abnormal Left knee flexion: Stiffness, able to get to about 100° today  Other   Erythema: absent  Scars: absent  Sensation: normal  Pulse: present  Swelling: mild  Effusion: effusion present    Comments:  No calf swelling or tenderness  He is warm and well perfused    His compartments are all soft and compressible  Procedures - none for this visit

## 2020-03-19 ENCOUNTER — EVALUATION (OUTPATIENT)
Dept: PHYSICAL THERAPY | Facility: MEDICAL CENTER | Age: 50
End: 2020-03-19
Payer: COMMERCIAL

## 2020-03-19 DIAGNOSIS — S82.092D OTHER CLOSED FRACTURE OF LEFT PATELLA WITH ROUTINE HEALING, SUBSEQUENT ENCOUNTER: ICD-10-CM

## 2020-03-19 DIAGNOSIS — M25.562 ACUTE PAIN OF LEFT KNEE: ICD-10-CM

## 2020-03-19 PROCEDURE — 97140 MANUAL THERAPY 1/> REGIONS: CPT | Performed by: PHYSICAL THERAPIST

## 2020-03-19 PROCEDURE — 97162 PT EVAL MOD COMPLEX 30 MIN: CPT | Performed by: PHYSICAL THERAPIST

## 2020-03-19 NOTE — PROGRESS NOTES
PT Evaluation     Today's date: 3/19/2020  Patient name: Janis Garcia  : 1970  MRN: [id number]  Referring provider: Sarthak Salinas DO  Dx:   Encounter Diagnosis     ICD-10-CM    1  Other closed fracture of left patella with routine healing, subsequent encounter S82 [id number] Ambulatory referral to Physical Therapy   2  Acute pain of left knee M25 562 Ambulatory referral to Physical Therapy                  Assessment  Assessment details: Janis Garcia is a 52 y o  male who presents with Other closed fracture of left patella with routine healing, subsequent encounter  Acute pain of left knee  Patient presents alert and oriented with the above impairments  Oksana Tavarez will benefit from PT to addres deficits in order to maximize and return to prior level of function  No further referral appears necessary at this time based upon examination results  Prognosis is good given HEP compliance  Please contact me if you have any questions or recommendations  Impairments: abnormal gait, abnormal or restricted ROM, abnormal movement, activity intolerance, impaired physical strength and lacks appropriate home exercise program  Understanding of Dx/Px/POC: excellent  Goals  Short Term Goals:   1  Pain decreased 2 ratings in 4 weeks  2  ROM increased 10* in 4 weeks  3  Strength increased 1/2 grade in 4 weeks    Long Term Goals:   1  ADLS/IADLS in related activities improved to maximal level in 8 weeks  2  Work performance improved to maximal level in 8 weeks  3  Recreational activities are improved to maximal level in 8 weeks  4   [place] with HEP in 8 weeks      Plan  Patient would benefit from: skilled physical therapy  Planned modality interventions: cryotherapy  Planned therapy interventions: home exercise program, functional ROM exercises, flexibility, therapeutic exercise, stretching, strengthening, patient education, neuromuscular re-education, manual therapy, balance and gait training  Frequency: 2x week  Duration in weeks: 8  Treatment plan discussed with: patient        Subjective Evaluation    History of Present Illness  Mechanism of injury: Pt reports on  while playing basketball  He saw Dr Jc Le the following day and underwent xrays that revealed fracture at inferior patella  He was placed in brace locked in extension up until f/u this week  Brace is now discontinued and he was instructed to avoid knee flexion further than 90 degrees  He was also referred to PT  Pain is somewhat worse now that brace has been discontinued  Tightness remains over knee w/out reports of any radicular symptoms  He has some difficulty w/ stair negotiation  He does not have any issues w/ prolonged standing or walking  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  No reports of sleep disturbance  He does report mild weakness  Denies any buckling, locking or falls  He did have MRI which was unremarkable for ligamentous damage  Pain  At best pain ratin  At worst pain ratin    Patient Goals  Patient goals for therapy: decreased pain, increased strength, independence with ADLs/IADLs, increased motion and return to sport/leisure activities          Objective     Palpation     Additional Palpation Details  Restrictions present over distal quad    Active Range of Motion   Left Knee   Flexion: 90 degrees   Extension: 0 degrees     Right Knee   Flexion: 145 degrees   Extension: 0 degrees     Strength/Myotome Testing     Left Hip   Planes of Motion   Flexion: 5  Extension: 5  Abduction: 5    Right Hip   Planes of Motion   Flexion: 5  Extension: 5  Abduction: 5    Left Knee   Prone flexion: 3-  Extension: 3+    Right Knee   Prone flexion: 5  Extension: 5    Ambulation     Comments   Pt lacks knee flexion at push off and during swing phase        Flowsheet Rows      Most Recent Value   PT/OT G-Codes   Current Score  45   Projected Score  68   FOTO information reviewed  Yes   Assessment Type  Evaluation   G code set  Other PT/OT Primary   Other PT Primary Current Status ()  CK   Other PT Primary Goal Status ()  CJ             Precautions: hx MI, hypertension      Manual  3/19            graston distal quad, lateral knee 10 min                                                                    Exercise Diary  3/19            Recumbent bike nv            Adductor squeezes 5"x10            Quad sets 5"x10            SLR abd x10            gastroc strap stretch 30"x3            SLR flex nv            Prone hip ext nv            Hamstring stretch nv            LAQ nv            HR nv                                                                                                                                                  Modalities

## 2020-03-27 ENCOUNTER — OFFICE VISIT (OUTPATIENT)
Dept: PHYSICAL THERAPY | Facility: MEDICAL CENTER | Age: 50
End: 2020-03-27
Payer: COMMERCIAL

## 2020-03-27 DIAGNOSIS — M25.562 ACUTE PAIN OF LEFT KNEE: ICD-10-CM

## 2020-03-27 DIAGNOSIS — S82.092D OTHER CLOSED FRACTURE OF LEFT PATELLA WITH ROUTINE HEALING, SUBSEQUENT ENCOUNTER: Primary | ICD-10-CM

## 2020-03-27 PROCEDURE — 97112 NEUROMUSCULAR REEDUCATION: CPT | Performed by: PHYSICAL THERAPIST

## 2020-03-27 PROCEDURE — 97110 THERAPEUTIC EXERCISES: CPT | Performed by: PHYSICAL THERAPIST

## 2020-03-27 PROCEDURE — 97140 MANUAL THERAPY 1/> REGIONS: CPT | Performed by: PHYSICAL THERAPIST

## 2020-03-27 NOTE — PROGRESS NOTES
Daily Note     Today's date: 3/27/2020  Patient name: Amy Contreras  : 1970  MRN: [id number]  Referring provider: Bernadine Wang DO  Dx:   Encounter Diagnosis     ICD-10-CM    1  Other closed fracture of left patella with routine healing, subsequent encounter [id number] [id number]    2  Acute pain of left knee M25 562                   Subjective: Pt offers no new complaints today  He has been compliant w/ HEP      Objective: See treatment diary below  Precautions: hx MI, hypertension      Manual  3/19 3/27           graston distal quad, lateral knee 10 min 10 min                                                                   Exercise Diary  3/19 3/27           Recumbent bike nv 10 min           Adductor squeezes 5"x10 5"x20           Quad sets 5"x10 5"x20           SLR abd x10 x20           gastroc strap stretch 30"x3 30"x3           SLR flex nv x20           Prone hip ext nv x20           Hamstring stretch nv 30"x3           LAQ nv 5"x20           HR nv x20                                                                                                                                                 Modalities                                                                Assessment: Tolerated treatment well  Patient demonstrated fatigue post treatment, exhibited good technique with therapeutic exercises and would benefit from continued PT  Restrictions present over distal quad and ITB    Plan: Continue per plan of care

## 2020-04-01 ENCOUNTER — OFFICE VISIT (OUTPATIENT)
Dept: PHYSICAL THERAPY | Facility: MEDICAL CENTER | Age: 50
End: 2020-04-01
Payer: COMMERCIAL

## 2020-04-01 DIAGNOSIS — M25.562 ACUTE PAIN OF LEFT KNEE: ICD-10-CM

## 2020-04-01 DIAGNOSIS — S82.092D OTHER CLOSED FRACTURE OF LEFT PATELLA WITH ROUTINE HEALING, SUBSEQUENT ENCOUNTER: Primary | ICD-10-CM

## 2020-04-01 PROCEDURE — 97140 MANUAL THERAPY 1/> REGIONS: CPT | Performed by: PHYSICAL THERAPIST

## 2020-04-01 PROCEDURE — 97112 NEUROMUSCULAR REEDUCATION: CPT | Performed by: PHYSICAL THERAPIST

## 2020-04-01 PROCEDURE — 97110 THERAPEUTIC EXERCISES: CPT | Performed by: PHYSICAL THERAPIST

## 2020-04-03 ENCOUNTER — OFFICE VISIT (OUTPATIENT)
Dept: PHYSICAL THERAPY | Facility: MEDICAL CENTER | Age: 50
End: 2020-04-03
Payer: COMMERCIAL

## 2020-04-03 DIAGNOSIS — S82.092D OTHER CLOSED FRACTURE OF LEFT PATELLA WITH ROUTINE HEALING, SUBSEQUENT ENCOUNTER: Primary | ICD-10-CM

## 2020-04-03 DIAGNOSIS — M25.562 ACUTE PAIN OF LEFT KNEE: ICD-10-CM

## 2020-04-03 PROCEDURE — 97140 MANUAL THERAPY 1/> REGIONS: CPT | Performed by: PHYSICAL THERAPIST

## 2020-04-03 PROCEDURE — 97112 NEUROMUSCULAR REEDUCATION: CPT | Performed by: PHYSICAL THERAPIST

## 2020-04-03 PROCEDURE — 97110 THERAPEUTIC EXERCISES: CPT | Performed by: PHYSICAL THERAPIST

## 2020-04-06 ENCOUNTER — OFFICE VISIT (OUTPATIENT)
Dept: PHYSICAL THERAPY | Facility: MEDICAL CENTER | Age: 50
End: 2020-04-06
Payer: COMMERCIAL

## 2020-04-06 DIAGNOSIS — S82.092D OTHER CLOSED FRACTURE OF LEFT PATELLA WITH ROUTINE HEALING, SUBSEQUENT ENCOUNTER: Primary | ICD-10-CM

## 2020-04-06 DIAGNOSIS — M25.562 ACUTE PAIN OF LEFT KNEE: ICD-10-CM

## 2020-04-06 PROCEDURE — 97140 MANUAL THERAPY 1/> REGIONS: CPT | Performed by: PHYSICAL THERAPIST

## 2020-04-06 PROCEDURE — 97112 NEUROMUSCULAR REEDUCATION: CPT | Performed by: PHYSICAL THERAPIST

## 2020-04-06 PROCEDURE — 97110 THERAPEUTIC EXERCISES: CPT | Performed by: PHYSICAL THERAPIST

## 2020-04-08 ENCOUNTER — APPOINTMENT (OUTPATIENT)
Dept: PHYSICAL THERAPY | Facility: MEDICAL CENTER | Age: 50
End: 2020-04-08
Payer: COMMERCIAL

## 2020-04-09 ENCOUNTER — APPOINTMENT (OUTPATIENT)
Dept: PHYSICAL THERAPY | Facility: MEDICAL CENTER | Age: 50
End: 2020-04-09
Payer: COMMERCIAL

## 2020-04-09 ENCOUNTER — OFFICE VISIT (OUTPATIENT)
Dept: PHYSICAL THERAPY | Facility: MEDICAL CENTER | Age: 50
End: 2020-04-09
Payer: COMMERCIAL

## 2020-04-09 DIAGNOSIS — M25.562 ACUTE PAIN OF LEFT KNEE: ICD-10-CM

## 2020-04-09 DIAGNOSIS — S82.092D OTHER CLOSED FRACTURE OF LEFT PATELLA WITH ROUTINE HEALING, SUBSEQUENT ENCOUNTER: Primary | ICD-10-CM

## 2020-04-09 PROCEDURE — 97140 MANUAL THERAPY 1/> REGIONS: CPT | Performed by: PHYSICAL THERAPIST

## 2020-04-09 PROCEDURE — 97110 THERAPEUTIC EXERCISES: CPT | Performed by: PHYSICAL THERAPIST

## 2020-04-09 PROCEDURE — 97112 NEUROMUSCULAR REEDUCATION: CPT | Performed by: PHYSICAL THERAPIST

## 2020-04-10 ENCOUNTER — APPOINTMENT (OUTPATIENT)
Dept: PHYSICAL THERAPY | Facility: MEDICAL CENTER | Age: 50
End: 2020-04-10
Payer: COMMERCIAL

## 2020-04-13 ENCOUNTER — OFFICE VISIT (OUTPATIENT)
Dept: PHYSICAL THERAPY | Facility: MEDICAL CENTER | Age: 50
End: 2020-04-13
Payer: COMMERCIAL

## 2020-04-13 DIAGNOSIS — M25.562 ACUTE PAIN OF LEFT KNEE: ICD-10-CM

## 2020-04-13 DIAGNOSIS — S82.092D OTHER CLOSED FRACTURE OF LEFT PATELLA WITH ROUTINE HEALING, SUBSEQUENT ENCOUNTER: Primary | ICD-10-CM

## 2020-04-13 PROCEDURE — 97140 MANUAL THERAPY 1/> REGIONS: CPT | Performed by: PHYSICAL THERAPIST

## 2020-04-13 PROCEDURE — 97112 NEUROMUSCULAR REEDUCATION: CPT | Performed by: PHYSICAL THERAPIST

## 2020-04-13 PROCEDURE — 97110 THERAPEUTIC EXERCISES: CPT | Performed by: PHYSICAL THERAPIST

## 2020-04-15 ENCOUNTER — APPOINTMENT (OUTPATIENT)
Dept: PHYSICAL THERAPY | Facility: MEDICAL CENTER | Age: 50
End: 2020-04-15
Payer: COMMERCIAL

## 2020-04-16 ENCOUNTER — OFFICE VISIT (OUTPATIENT)
Dept: PHYSICAL THERAPY | Facility: MEDICAL CENTER | Age: 50
End: 2020-04-16
Payer: COMMERCIAL

## 2020-04-16 DIAGNOSIS — S82.092D OTHER CLOSED FRACTURE OF LEFT PATELLA WITH ROUTINE HEALING, SUBSEQUENT ENCOUNTER: Primary | ICD-10-CM

## 2020-04-16 DIAGNOSIS — M25.562 ACUTE PAIN OF LEFT KNEE: ICD-10-CM

## 2020-04-16 PROCEDURE — 97110 THERAPEUTIC EXERCISES: CPT | Performed by: PHYSICAL THERAPIST

## 2020-04-16 PROCEDURE — 97112 NEUROMUSCULAR REEDUCATION: CPT | Performed by: PHYSICAL THERAPIST

## 2020-04-16 PROCEDURE — 97140 MANUAL THERAPY 1/> REGIONS: CPT | Performed by: PHYSICAL THERAPIST

## 2020-04-17 ENCOUNTER — APPOINTMENT (OUTPATIENT)
Dept: PHYSICAL THERAPY | Facility: MEDICAL CENTER | Age: 50
End: 2020-04-17
Payer: COMMERCIAL

## 2020-04-20 ENCOUNTER — OFFICE VISIT (OUTPATIENT)
Dept: PHYSICAL THERAPY | Facility: MEDICAL CENTER | Age: 50
End: 2020-04-20
Payer: COMMERCIAL

## 2020-04-20 DIAGNOSIS — S82.092D OTHER CLOSED FRACTURE OF LEFT PATELLA WITH ROUTINE HEALING, SUBSEQUENT ENCOUNTER: Primary | ICD-10-CM

## 2020-04-20 DIAGNOSIS — M25.562 ACUTE PAIN OF LEFT KNEE: ICD-10-CM

## 2020-04-20 PROCEDURE — 97112 NEUROMUSCULAR REEDUCATION: CPT | Performed by: PHYSICAL THERAPIST

## 2020-04-20 PROCEDURE — 97140 MANUAL THERAPY 1/> REGIONS: CPT | Performed by: PHYSICAL THERAPIST

## 2020-04-20 PROCEDURE — 97110 THERAPEUTIC EXERCISES: CPT | Performed by: PHYSICAL THERAPIST

## 2020-04-23 ENCOUNTER — TELEPHONE (OUTPATIENT)
Dept: OBGYN CLINIC | Facility: CLINIC | Age: 50
End: 2020-04-23

## 2020-05-13 DIAGNOSIS — E78.2 MIXED HYPERLIPIDEMIA: ICD-10-CM

## 2020-05-13 RX ORDER — ATORVASTATIN CALCIUM 40 MG/1
40 TABLET, FILM COATED ORAL DAILY
Qty: 90 TABLET | Refills: 0 | Status: SHIPPED | OUTPATIENT
Start: 2020-05-13 | End: 2020-08-17

## 2020-05-21 DIAGNOSIS — E78.5 DYSLIPIDEMIA: ICD-10-CM

## 2020-05-21 DIAGNOSIS — I15.8 OTHER SECONDARY HYPERTENSION: ICD-10-CM

## 2020-05-21 RX ORDER — CLOPIDOGREL BISULFATE 75 MG/1
TABLET ORAL
Qty: 90 TABLET | Refills: 3 | Status: SHIPPED | OUTPATIENT
Start: 2020-05-21 | End: 2021-04-02

## 2020-05-21 RX ORDER — LISINOPRIL 5 MG/1
TABLET ORAL
Qty: 90 TABLET | Refills: 3 | Status: SHIPPED | OUTPATIENT
Start: 2020-05-21 | End: 2021-04-02 | Stop reason: SDUPTHER

## 2020-06-01 DIAGNOSIS — I10 ESSENTIAL HYPERTENSION: ICD-10-CM

## 2020-06-01 RX ORDER — METOPROLOL SUCCINATE 25 MG/1
25 TABLET, EXTENDED RELEASE ORAL DAILY
Qty: 90 TABLET | Refills: 1 | Status: SHIPPED | OUTPATIENT
Start: 2020-06-01 | End: 2020-12-21

## 2020-08-16 DIAGNOSIS — E78.2 MIXED HYPERLIPIDEMIA: ICD-10-CM

## 2020-08-17 RX ORDER — ATORVASTATIN CALCIUM 40 MG/1
TABLET, FILM COATED ORAL
Qty: 90 TABLET | Refills: 0 | Status: SHIPPED | OUTPATIENT
Start: 2020-08-17 | End: 2020-12-21

## 2020-12-19 DIAGNOSIS — E78.2 MIXED HYPERLIPIDEMIA: ICD-10-CM

## 2020-12-19 DIAGNOSIS — I10 ESSENTIAL HYPERTENSION: ICD-10-CM

## 2020-12-21 RX ORDER — ATORVASTATIN CALCIUM 40 MG/1
TABLET, FILM COATED ORAL
Qty: 90 TABLET | Refills: 0 | Status: SHIPPED | OUTPATIENT
Start: 2020-12-21 | End: 2021-03-23

## 2020-12-21 RX ORDER — METOPROLOL SUCCINATE 25 MG/1
TABLET, EXTENDED RELEASE ORAL
Qty: 90 TABLET | Refills: 1 | Status: SHIPPED | OUTPATIENT
Start: 2020-12-21 | End: 2021-04-02 | Stop reason: SDUPTHER

## 2021-03-23 DIAGNOSIS — E78.2 MIXED HYPERLIPIDEMIA: ICD-10-CM

## 2021-03-23 RX ORDER — ATORVASTATIN CALCIUM 40 MG/1
TABLET, FILM COATED ORAL
Qty: 90 TABLET | Refills: 0 | Status: SHIPPED | OUTPATIENT
Start: 2021-03-23 | End: 2021-04-02 | Stop reason: SDUPTHER

## 2021-04-02 DIAGNOSIS — E78.2 MIXED HYPERLIPIDEMIA: ICD-10-CM

## 2021-04-02 DIAGNOSIS — I15.8 OTHER SECONDARY HYPERTENSION: ICD-10-CM

## 2021-04-02 DIAGNOSIS — E78.5 DYSLIPIDEMIA: ICD-10-CM

## 2021-04-02 DIAGNOSIS — I10 ESSENTIAL HYPERTENSION: ICD-10-CM

## 2021-04-02 RX ORDER — LISINOPRIL 5 MG/1
5 TABLET ORAL DAILY
Qty: 90 TABLET | Refills: 3 | Status: SHIPPED | OUTPATIENT
Start: 2021-04-02 | End: 2022-03-28

## 2021-04-02 RX ORDER — METOPROLOL SUCCINATE 25 MG/1
25 TABLET, EXTENDED RELEASE ORAL DAILY
Qty: 90 TABLET | Refills: 1 | Status: SHIPPED | OUTPATIENT
Start: 2021-04-02 | End: 2021-11-17

## 2021-04-02 RX ORDER — ATORVASTATIN CALCIUM 40 MG/1
40 TABLET, FILM COATED ORAL DAILY
Qty: 90 TABLET | Refills: 2 | Status: SHIPPED | OUTPATIENT
Start: 2021-04-02 | End: 2022-03-14

## 2021-04-02 RX ORDER — CLOPIDOGREL BISULFATE 75 MG/1
75 TABLET ORAL DAILY
Qty: 90 TABLET | Refills: 2 | Status: SHIPPED | OUTPATIENT
Start: 2021-04-02 | End: 2022-03-28

## 2021-10-29 ENCOUNTER — TELEPHONE (OUTPATIENT)
Dept: OBGYN CLINIC | Facility: HOSPITAL | Age: 51
End: 2021-10-29

## 2021-11-01 ENCOUNTER — HOSPITAL ENCOUNTER (OUTPATIENT)
Dept: RADIOLOGY | Facility: HOSPITAL | Age: 51
Discharge: HOME | End: 2021-11-01
Payer: COMMERCIAL

## 2021-11-01 ENCOUNTER — OFFICE VISIT (OUTPATIENT)
Dept: OBGYN CLINIC | Facility: HOSPITAL | Age: 51
End: 2021-11-01
Payer: COMMERCIAL

## 2021-11-01 VITALS — DIASTOLIC BLOOD PRESSURE: 85 MMHG | HEART RATE: 90 BPM | SYSTOLIC BLOOD PRESSURE: 120 MMHG

## 2021-11-01 DIAGNOSIS — M25.511 RIGHT SHOULDER PAIN, UNSPECIFIED CHRONICITY: ICD-10-CM

## 2021-11-01 DIAGNOSIS — M25.511 RIGHT SHOULDER PAIN, UNSPECIFIED CHRONICITY: Primary | ICD-10-CM

## 2021-11-01 DIAGNOSIS — M75.01 ADHESIVE CAPSULITIS OF RIGHT SHOULDER: ICD-10-CM

## 2021-11-01 PROCEDURE — 73030 X-RAY EXAM OF SHOULDER: CPT

## 2021-11-01 PROCEDURE — 20610 DRAIN/INJ JOINT/BURSA W/O US: CPT | Performed by: PHYSICIAN ASSISTANT

## 2021-11-01 PROCEDURE — 99213 OFFICE O/P EST LOW 20 MIN: CPT | Performed by: PHYSICIAN ASSISTANT

## 2021-11-01 RX ORDER — TRIAMCINOLONE ACETONIDE 40 MG/ML
80 INJECTION, SUSPENSION INTRA-ARTICULAR; INTRAMUSCULAR
Status: COMPLETED | OUTPATIENT
Start: 2021-11-01 | End: 2021-11-01

## 2021-11-01 RX ORDER — LIDOCAINE HYDROCHLORIDE 10 MG/ML
2 INJECTION, SOLUTION INFILTRATION; PERINEURAL
Status: COMPLETED | OUTPATIENT
Start: 2021-11-01 | End: 2021-11-01

## 2021-11-01 RX ORDER — BUPIVACAINE HYDROCHLORIDE 5 MG/ML
2 INJECTION, SOLUTION EPIDURAL; INTRACAUDAL
Status: COMPLETED | OUTPATIENT
Start: 2021-11-01 | End: 2021-11-01

## 2021-11-01 RX ADMIN — TRIAMCINOLONE ACETONIDE 80 MG: 40 INJECTION, SUSPENSION INTRA-ARTICULAR; INTRAMUSCULAR at 15:10

## 2021-11-01 RX ADMIN — BUPIVACAINE HYDROCHLORIDE 2 ML: 5 INJECTION, SOLUTION EPIDURAL; INTRACAUDAL at 15:09

## 2021-11-01 RX ADMIN — LIDOCAINE HYDROCHLORIDE 2 ML: 10 INJECTION, SOLUTION INFILTRATION; PERINEURAL at 15:09

## 2021-11-10 ENCOUNTER — EVALUATION (OUTPATIENT)
Dept: PHYSICAL THERAPY | Facility: MEDICAL CENTER | Age: 51
End: 2021-11-10
Payer: COMMERCIAL

## 2021-11-10 DIAGNOSIS — M75.01 ADHESIVE CAPSULITIS OF RIGHT SHOULDER: Primary | ICD-10-CM

## 2021-11-10 DIAGNOSIS — M25.511 RIGHT SHOULDER PAIN, UNSPECIFIED CHRONICITY: ICD-10-CM

## 2021-11-10 PROCEDURE — 97110 THERAPEUTIC EXERCISES: CPT | Performed by: PHYSICAL THERAPIST

## 2021-11-10 PROCEDURE — 97162 PT EVAL MOD COMPLEX 30 MIN: CPT | Performed by: PHYSICAL THERAPIST

## 2021-11-12 ENCOUNTER — OFFICE VISIT (OUTPATIENT)
Dept: PHYSICAL THERAPY | Facility: MEDICAL CENTER | Age: 51
End: 2021-11-12
Payer: COMMERCIAL

## 2021-11-12 DIAGNOSIS — M75.01 ADHESIVE CAPSULITIS OF RIGHT SHOULDER: ICD-10-CM

## 2021-11-12 DIAGNOSIS — M25.511 RIGHT SHOULDER PAIN, UNSPECIFIED CHRONICITY: Primary | ICD-10-CM

## 2021-11-12 PROCEDURE — 97140 MANUAL THERAPY 1/> REGIONS: CPT | Performed by: PHYSICAL THERAPIST

## 2021-11-15 ENCOUNTER — OFFICE VISIT (OUTPATIENT)
Dept: PHYSICAL THERAPY | Facility: MEDICAL CENTER | Age: 51
End: 2021-11-15
Payer: COMMERCIAL

## 2021-11-15 DIAGNOSIS — M75.01 ADHESIVE CAPSULITIS OF RIGHT SHOULDER: ICD-10-CM

## 2021-11-15 DIAGNOSIS — M25.511 RIGHT SHOULDER PAIN, UNSPECIFIED CHRONICITY: Primary | ICD-10-CM

## 2021-11-15 PROCEDURE — 97140 MANUAL THERAPY 1/> REGIONS: CPT | Performed by: PHYSICAL THERAPIST

## 2021-11-16 DIAGNOSIS — I10 ESSENTIAL HYPERTENSION: ICD-10-CM

## 2021-11-17 ENCOUNTER — OFFICE VISIT (OUTPATIENT)
Dept: PHYSICAL THERAPY | Facility: MEDICAL CENTER | Age: 51
End: 2021-11-17
Payer: COMMERCIAL

## 2021-11-17 DIAGNOSIS — M75.01 ADHESIVE CAPSULITIS OF RIGHT SHOULDER: ICD-10-CM

## 2021-11-17 DIAGNOSIS — M25.511 RIGHT SHOULDER PAIN, UNSPECIFIED CHRONICITY: Primary | ICD-10-CM

## 2021-11-17 PROCEDURE — 97140 MANUAL THERAPY 1/> REGIONS: CPT | Performed by: PHYSICAL THERAPIST

## 2021-11-17 RX ORDER — METOPROLOL SUCCINATE 25 MG/1
TABLET, EXTENDED RELEASE ORAL
Qty: 90 TABLET | Refills: 0 | Status: SHIPPED | OUTPATIENT
Start: 2021-11-17 | End: 2022-03-28

## 2021-11-22 ENCOUNTER — OFFICE VISIT (OUTPATIENT)
Dept: PHYSICAL THERAPY | Facility: MEDICAL CENTER | Age: 51
End: 2021-11-22
Payer: COMMERCIAL

## 2021-11-22 DIAGNOSIS — M75.01 ADHESIVE CAPSULITIS OF RIGHT SHOULDER: ICD-10-CM

## 2021-11-22 DIAGNOSIS — M25.511 RIGHT SHOULDER PAIN, UNSPECIFIED CHRONICITY: Primary | ICD-10-CM

## 2021-11-22 PROCEDURE — 97140 MANUAL THERAPY 1/> REGIONS: CPT | Performed by: PHYSICAL THERAPIST

## 2021-11-24 ENCOUNTER — OFFICE VISIT (OUTPATIENT)
Dept: PHYSICAL THERAPY | Facility: MEDICAL CENTER | Age: 51
End: 2021-11-24
Payer: COMMERCIAL

## 2021-11-24 DIAGNOSIS — M75.01 ADHESIVE CAPSULITIS OF RIGHT SHOULDER: ICD-10-CM

## 2021-11-24 DIAGNOSIS — M25.511 RIGHT SHOULDER PAIN, UNSPECIFIED CHRONICITY: Primary | ICD-10-CM

## 2021-11-24 PROCEDURE — 97140 MANUAL THERAPY 1/> REGIONS: CPT | Performed by: PHYSICAL THERAPIST

## 2021-11-29 ENCOUNTER — APPOINTMENT (OUTPATIENT)
Dept: PHYSICAL THERAPY | Facility: MEDICAL CENTER | Age: 51
End: 2021-11-29
Payer: COMMERCIAL

## 2021-12-01 ENCOUNTER — OFFICE VISIT (OUTPATIENT)
Dept: PHYSICAL THERAPY | Facility: MEDICAL CENTER | Age: 51
End: 2021-12-01
Payer: COMMERCIAL

## 2021-12-01 DIAGNOSIS — M75.01 ADHESIVE CAPSULITIS OF RIGHT SHOULDER: ICD-10-CM

## 2021-12-01 DIAGNOSIS — M25.511 RIGHT SHOULDER PAIN, UNSPECIFIED CHRONICITY: Primary | ICD-10-CM

## 2021-12-01 PROCEDURE — 97140 MANUAL THERAPY 1/> REGIONS: CPT | Performed by: PHYSICAL THERAPIST

## 2021-12-03 ENCOUNTER — OFFICE VISIT (OUTPATIENT)
Dept: PHYSICAL THERAPY | Facility: MEDICAL CENTER | Age: 51
End: 2021-12-03
Payer: COMMERCIAL

## 2021-12-03 DIAGNOSIS — M25.511 RIGHT SHOULDER PAIN, UNSPECIFIED CHRONICITY: Primary | ICD-10-CM

## 2021-12-03 DIAGNOSIS — M75.01 ADHESIVE CAPSULITIS OF RIGHT SHOULDER: ICD-10-CM

## 2021-12-03 PROCEDURE — 97140 MANUAL THERAPY 1/> REGIONS: CPT | Performed by: PHYSICAL THERAPIST

## 2021-12-06 ENCOUNTER — OFFICE VISIT (OUTPATIENT)
Dept: PHYSICAL THERAPY | Facility: MEDICAL CENTER | Age: 51
End: 2021-12-06
Payer: COMMERCIAL

## 2021-12-06 DIAGNOSIS — M25.511 RIGHT SHOULDER PAIN, UNSPECIFIED CHRONICITY: Primary | ICD-10-CM

## 2021-12-06 DIAGNOSIS — M75.01 ADHESIVE CAPSULITIS OF RIGHT SHOULDER: ICD-10-CM

## 2021-12-06 PROCEDURE — 97140 MANUAL THERAPY 1/> REGIONS: CPT | Performed by: PHYSICAL THERAPIST

## 2021-12-08 ENCOUNTER — OFFICE VISIT (OUTPATIENT)
Dept: PHYSICAL THERAPY | Facility: MEDICAL CENTER | Age: 51
End: 2021-12-08
Payer: COMMERCIAL

## 2021-12-08 DIAGNOSIS — M75.01 ADHESIVE CAPSULITIS OF RIGHT SHOULDER: ICD-10-CM

## 2021-12-08 DIAGNOSIS — M25.511 RIGHT SHOULDER PAIN, UNSPECIFIED CHRONICITY: Primary | ICD-10-CM

## 2021-12-08 PROCEDURE — 97140 MANUAL THERAPY 1/> REGIONS: CPT | Performed by: PHYSICAL THERAPIST

## 2021-12-13 ENCOUNTER — OFFICE VISIT (OUTPATIENT)
Dept: PHYSICAL THERAPY | Facility: MEDICAL CENTER | Age: 51
End: 2021-12-13
Payer: COMMERCIAL

## 2021-12-13 DIAGNOSIS — M75.01 ADHESIVE CAPSULITIS OF RIGHT SHOULDER: ICD-10-CM

## 2021-12-13 DIAGNOSIS — M25.511 RIGHT SHOULDER PAIN, UNSPECIFIED CHRONICITY: Primary | ICD-10-CM

## 2021-12-13 PROCEDURE — 97140 MANUAL THERAPY 1/> REGIONS: CPT | Performed by: PHYSICAL THERAPIST

## 2021-12-15 ENCOUNTER — EVALUATION (OUTPATIENT)
Dept: PHYSICAL THERAPY | Facility: MEDICAL CENTER | Age: 51
End: 2021-12-15
Payer: COMMERCIAL

## 2021-12-15 DIAGNOSIS — M25.511 RIGHT SHOULDER PAIN, UNSPECIFIED CHRONICITY: Primary | ICD-10-CM

## 2021-12-15 PROCEDURE — 97140 MANUAL THERAPY 1/> REGIONS: CPT | Performed by: PHYSICAL THERAPIST

## 2021-12-20 ENCOUNTER — OFFICE VISIT (OUTPATIENT)
Dept: PHYSICAL THERAPY | Facility: MEDICAL CENTER | Age: 51
End: 2021-12-20
Payer: COMMERCIAL

## 2021-12-20 DIAGNOSIS — M75.01 ADHESIVE CAPSULITIS OF RIGHT SHOULDER: ICD-10-CM

## 2021-12-20 DIAGNOSIS — M25.511 RIGHT SHOULDER PAIN, UNSPECIFIED CHRONICITY: Primary | ICD-10-CM

## 2021-12-20 PROCEDURE — 97140 MANUAL THERAPY 1/> REGIONS: CPT | Performed by: PHYSICAL THERAPIST

## 2021-12-22 ENCOUNTER — OFFICE VISIT (OUTPATIENT)
Dept: OBGYN CLINIC | Facility: MEDICAL CENTER | Age: 51
End: 2021-12-22
Payer: COMMERCIAL

## 2021-12-22 VITALS
BODY MASS INDEX: 28.75 KG/M2 | DIASTOLIC BLOOD PRESSURE: 83 MMHG | HEIGHT: 74 IN | WEIGHT: 224 LBS | SYSTOLIC BLOOD PRESSURE: 129 MMHG

## 2021-12-22 DIAGNOSIS — G89.29 CHRONIC RIGHT SHOULDER PAIN: Primary | ICD-10-CM

## 2021-12-22 DIAGNOSIS — M25.511 CHRONIC RIGHT SHOULDER PAIN: Primary | ICD-10-CM

## 2021-12-22 PROCEDURE — 99214 OFFICE O/P EST MOD 30 MIN: CPT | Performed by: PHYSICAL MEDICINE & REHABILITATION

## 2021-12-27 ENCOUNTER — OFFICE VISIT (OUTPATIENT)
Dept: PHYSICAL THERAPY | Facility: MEDICAL CENTER | Age: 51
End: 2021-12-27
Payer: COMMERCIAL

## 2021-12-27 DIAGNOSIS — M25.511 RIGHT SHOULDER PAIN, UNSPECIFIED CHRONICITY: Primary | ICD-10-CM

## 2021-12-27 DIAGNOSIS — M75.01 ADHESIVE CAPSULITIS OF RIGHT SHOULDER: ICD-10-CM

## 2021-12-27 PROCEDURE — 97140 MANUAL THERAPY 1/> REGIONS: CPT | Performed by: PHYSICAL THERAPIST

## 2022-01-03 ENCOUNTER — OFFICE VISIT (OUTPATIENT)
Dept: PHYSICAL THERAPY | Facility: MEDICAL CENTER | Age: 52
End: 2022-01-03
Payer: COMMERCIAL

## 2022-01-03 DIAGNOSIS — M75.01 ADHESIVE CAPSULITIS OF RIGHT SHOULDER: ICD-10-CM

## 2022-01-03 DIAGNOSIS — M25.511 RIGHT SHOULDER PAIN, UNSPECIFIED CHRONICITY: Primary | ICD-10-CM

## 2022-01-03 PROCEDURE — 97140 MANUAL THERAPY 1/> REGIONS: CPT | Performed by: PHYSICAL THERAPIST

## 2022-01-03 NOTE — PROGRESS NOTES
Daily Note     Today's date: 1/3/2022  Patient name: Chelly Leggett  : 1970  MRN: [id number]  Referring provider: Makayla Ferguson  Dx:   Encounter Diagnosis     ICD-10-CM    1  Right shoulder pain, unspecified chronicity  M25 511    2  Adhesive capsulitis of right shoulder  M75 01                   Subjective: Pt states his shoulder is sore upon presentation, without any change in activity over the weekend  Objective: See treatment diary below      Assessment: Tolerated treatment well  Patient demonstrated fatigue post treatment, exhibited good technique with therapeutic exercises and would benefit from continued PT  Good anuradha to manual intervention, mild pain at end range flex and IR persists  Plan: Continue per plan of care        Precautions: CAD, HTN    Manuals 12/8 12/13 12/15 12/20 12/27 1/3 11/24 12/1 12/3 12/6   PROM R shoulder BG BG BG BG BG BG BG BG BG BG   Ind/a-p joint mobs Grade III BG BG BG BG BG BG BG BG BG BG   RA      BG                                         Neuro Re-Ed                                                                                                                                                                                               Ther Ex 12/8 12/13 12/15 12/20 12/27 1/3 11/24 12/1 12/3 12/6   pullies x5' x5' x5' x5' x5' x5' x5' x5' x5' x5'   IR strap stretch 20"x4 20"x4 20"x4 20"x4 20"x4 20"x4 20"x4 20"x4 20"x4 20"x4   Wall slides 20"x4                     Seated ER stretch 20"x4 20"x4 20"x4 20"x4 20"x4 20"x4 20"x4 20"x4 20"x4 20"x4   Table slides flex/scap 20"x4ea 10"x10ea 10"x10ea 10"x10ea 10"x10 10"x10ea 10"x10ea 10"x10ea 10"x10 10"x10   Post capsule stretch 20"x4 20"x4  20"x4                 Wand ext/hor add/IR 10"x10ea 10"x10ea  10"x10 10"x10ea  10"x10ea 10"x10 10"x10 10"x10 10"x10 10"x10   Sleeper stretch 20"x4 20"x2 20"x4 20"x4 20"x4 20"x4 20"x4 20"x4 20"x4 20"x4   Wand flex/abd 10"x10ea 10"x10ea 10"x10ea 10"x10  10"x10 10"x10ea 10"x10 10"x10 10"x10 10"x10   Wand ER 10"x10 10"x10 10"x10 10"x10  10"x10 10"x10 10"x10 10"x10 10"x10 10"x10                           Gait Training                                                                       Modalities                       MHP                       CP

## 2022-01-05 ENCOUNTER — OFFICE VISIT (OUTPATIENT)
Dept: PHYSICAL THERAPY | Facility: MEDICAL CENTER | Age: 52
End: 2022-01-05
Payer: COMMERCIAL

## 2022-01-05 DIAGNOSIS — M25.511 RIGHT SHOULDER PAIN, UNSPECIFIED CHRONICITY: Primary | ICD-10-CM

## 2022-01-05 PROCEDURE — 97140 MANUAL THERAPY 1/> REGIONS: CPT | Performed by: PHYSICAL THERAPIST

## 2022-01-05 NOTE — PROGRESS NOTES
Daily Note     Today's date: 2022  Patient name: Maegan Poon  : 1970  MRN: [id number]  Referring provider: Eugene Hyde  Dx:   Encounter Diagnosis     ICD-10-CM    1  Right shoulder pain, unspecified chronicity  M25 511                   Subjective: Pt reports no new complaints  Objective: See treatment diary below      Assessment: Tolerated treatment well  Patient demonstrated fatigue post treatment, exhibited good technique with therapeutic exercises and would benefit from continued PT  Plan: Continue per plan of care        Precautions: CAD, HTN    Manuals 12/8 12/13 12/15 12/20 12/27 1/3 1/5 12/1 12/3 12/6   PROM R shoulder BG BG BG BG BG BG BG BG BG BG   Ind/a-p joint mobs Grade III BG BG BG BG BG BG BG BG BG BG   RA      BG                                         Neuro Re-Ed                                                                                                                                                                                               Ther Ex 12/8 12/13 12/15 12/20 12/27 1/3 1/5 12/1 12/3 12/6   pullies x5' x5' x5' x5' x5' x5' x5' x5' x5' x5'   IR strap stretch 20"x4 20"x4 20"x4 20"x4 20"x4 20"x4 20"x4 20"x4 20"x4 20"x4   Wall slides 20"x4                     Seated ER stretch 20"x4 20"x4 20"x4 20"x4 20"x4 20"x4 20"x4 20"x4 20"x4 20"x4   Table slides flex/scap 20"x4ea 10"x10ea 10"x10ea 10"x10ea 10"x10 10"x10ea 10"x10ea 10"x10ea 10"x10 10"x10   Post capsule stretch 20"x4 20"x4  20"x4                 Wand ext/hor add/IR 10"x10ea 10"x10ea  10"x10 10"x10ea  10"x10ea 10"x10 10"x10 10"x10 10"x10 10"x10   Sleeper stretch 20"x4 20"x2 20"x4 20"x4 20"x4 20"x4 20"x4 20"x4 20"x4 20"x4   Wand flex/abd 10"x10ea 10"x10ea 10"x10ea 10"x10  10"x10 10"x10ea 10"x10 10"x10 10"x10 10"x10   Wand ER 10"x10 10"x10 10"x10 10"x10  10"x10 10"x10 10"x10 10"x10 10"x10 10"x10                           Gait Training                                                                     Modalities                       MHP                       CP

## 2022-01-12 ENCOUNTER — OFFICE VISIT (OUTPATIENT)
Dept: PHYSICAL THERAPY | Facility: MEDICAL CENTER | Age: 52
End: 2022-01-12
Payer: COMMERCIAL

## 2022-01-12 DIAGNOSIS — M25.511 RIGHT SHOULDER PAIN, UNSPECIFIED CHRONICITY: Primary | ICD-10-CM

## 2022-01-12 DIAGNOSIS — M75.01 ADHESIVE CAPSULITIS OF RIGHT SHOULDER: ICD-10-CM

## 2022-01-12 PROCEDURE — 97140 MANUAL THERAPY 1/> REGIONS: CPT | Performed by: PHYSICAL THERAPIST

## 2022-01-12 NOTE — PROGRESS NOTES
Daily Note     Today's date: 2022  Patient name: Ragini Perez  : 1970  MRN: [id number]  Referring provider: Angie Chagn  Dx:   Encounter Diagnosis     ICD-10-CM    1  Right shoulder pain, unspecified chronicity  M25 511    2  Adhesive capsulitis of right shoulder  M75 01                   Subjective: Pt states he cont to have most limitation with reaching behind his back, otherwise is feeling good with his ROM with functional activities  Pt states he is now able to sleep and toss and turn on his shoulder without pain or waking up  Objective: See treatment diary below      Assessment: Tolerated treatment well  Patient demonstrated fatigue post treatment, exhibited good technique with therapeutic exercises and would benefit from continued PT  Plan: Continue per plan of care        Precautions: CAD, HTN    Manuals 12/8 12/13 12/15 12/20 12/27 1/3 1/5 1/12 12/3 12/6   PROM R shoulder BG BG BG BG BG BG BG BG BG BG   Ind/a-p joint mobs Grade III BG BG BG BG BG BG BG BG BG BG   RA      BG                                         Neuro Re-Ed                                                                                                                                                                                               Ther Ex 12/8 12/13 12/15 12/20 12/27 1/3 1/5 1/12 12/3 12/6   pullies x5' x5' x5' x5' x5' x5' x5' x5' x5' x5'   IR strap stretch 20"x4 20"x4 20"x4 20"x4 20"x4 20"x4 20"x4 20"x4 20"x4 20"x4   Wall slides 20"x4                     Seated ER stretch 20"x4 20"x4 20"x4 20"x4 20"x4 20"x4 20"x4 20"x4 20"x4 20"x4   Table slides flex/scap 20"x4ea 10"x10ea 10"x10ea 10"x10ea 10"x10 10"x10ea 10"x10ea 10"x10ea 10"x10 10"x10   Post capsule stretch 20"x4 20"x4  20"x4                 Wand ext/hor add/IR 10"x10ea 10"x10ea  10"x10 10"x10ea  10"x10ea 10"x10 10"x10 10"x10 10"x10 10"x10   Sleeper stretch 20"x4 20"x2 20"x4 20"x4 20"x4 20"x4 20"x4 20"x4 20"x4 20"x4   Wand flex/abd 10"x10ea 10"x10ea 10"x10ea 10"x10  10"x10 10"x10ea 10"x10 10"x10 10"x10 10"x10   Wand ER 10"x10 10"x10 10"x10 10"x10  10"x10 10"x10 10"x10 10"x10 10"x10 10"x10                           Gait Training                                                                       Modalities                       MHP                       CP

## 2022-03-11 DIAGNOSIS — E78.2 MIXED HYPERLIPIDEMIA: ICD-10-CM

## 2022-03-14 RX ORDER — ATORVASTATIN CALCIUM 40 MG/1
TABLET, FILM COATED ORAL
Qty: 90 TABLET | Refills: 0 | Status: SHIPPED | OUTPATIENT
Start: 2022-03-14 | End: 2022-07-13

## 2022-03-28 DIAGNOSIS — I15.8 OTHER SECONDARY HYPERTENSION: ICD-10-CM

## 2022-03-28 DIAGNOSIS — E78.5 DYSLIPIDEMIA: ICD-10-CM

## 2022-03-28 DIAGNOSIS — I10 ESSENTIAL HYPERTENSION: ICD-10-CM

## 2022-03-28 RX ORDER — METOPROLOL SUCCINATE 25 MG/1
TABLET, EXTENDED RELEASE ORAL
Qty: 90 TABLET | Refills: 0 | Status: SHIPPED | OUTPATIENT
Start: 2022-03-28 | End: 2022-07-13

## 2022-03-28 RX ORDER — CLOPIDOGREL BISULFATE 75 MG/1
TABLET ORAL
Qty: 90 TABLET | Refills: 0 | Status: SHIPPED | OUTPATIENT
Start: 2022-03-28 | End: 2022-07-13

## 2022-03-28 RX ORDER — LISINOPRIL 5 MG/1
TABLET ORAL
Qty: 90 TABLET | Refills: 0 | Status: SHIPPED | OUTPATIENT
Start: 2022-03-28 | End: 2022-07-13

## 2022-07-12 DIAGNOSIS — I10 ESSENTIAL HYPERTENSION: ICD-10-CM

## 2022-07-12 DIAGNOSIS — I15.8 OTHER SECONDARY HYPERTENSION: ICD-10-CM

## 2022-07-12 DIAGNOSIS — E78.2 MIXED HYPERLIPIDEMIA: ICD-10-CM

## 2022-07-12 DIAGNOSIS — E78.5 DYSLIPIDEMIA: ICD-10-CM

## 2022-07-13 RX ORDER — METOPROLOL SUCCINATE 25 MG/1
TABLET, EXTENDED RELEASE ORAL
Qty: 90 TABLET | Refills: 0 | Status: SHIPPED | OUTPATIENT
Start: 2022-07-13

## 2022-07-13 RX ORDER — LISINOPRIL 5 MG/1
TABLET ORAL
Qty: 90 TABLET | Refills: 0 | Status: SHIPPED | OUTPATIENT
Start: 2022-07-13

## 2022-07-13 RX ORDER — ATORVASTATIN CALCIUM 40 MG/1
TABLET, FILM COATED ORAL
Qty: 90 TABLET | Refills: 0 | Status: SHIPPED | OUTPATIENT
Start: 2022-07-13

## 2022-07-13 RX ORDER — CLOPIDOGREL BISULFATE 75 MG/1
TABLET ORAL
Qty: 90 TABLET | Refills: 0 | Status: SHIPPED | OUTPATIENT
Start: 2022-07-13

## 2022-12-09 DIAGNOSIS — I15.8 OTHER SECONDARY HYPERTENSION: ICD-10-CM

## 2022-12-09 DIAGNOSIS — E78.5 DYSLIPIDEMIA: ICD-10-CM

## 2022-12-09 DIAGNOSIS — E78.2 MIXED HYPERLIPIDEMIA: ICD-10-CM

## 2022-12-09 DIAGNOSIS — I10 ESSENTIAL HYPERTENSION: ICD-10-CM

## 2022-12-09 RX ORDER — CLOPIDOGREL BISULFATE 75 MG/1
TABLET ORAL
Qty: 90 TABLET | Refills: 0 | Status: SHIPPED | OUTPATIENT
Start: 2022-12-09

## 2022-12-09 RX ORDER — LISINOPRIL 5 MG/1
TABLET ORAL
Qty: 90 TABLET | Refills: 0 | Status: SHIPPED | OUTPATIENT
Start: 2022-12-09

## 2022-12-09 RX ORDER — METOPROLOL SUCCINATE 25 MG/1
TABLET, EXTENDED RELEASE ORAL
Qty: 90 TABLET | Refills: 0 | Status: SHIPPED | OUTPATIENT
Start: 2022-12-09

## 2022-12-09 RX ORDER — ATORVASTATIN CALCIUM 40 MG/1
TABLET, FILM COATED ORAL
Qty: 90 TABLET | Refills: 0 | Status: SHIPPED | OUTPATIENT
Start: 2022-12-09

## 2022-12-09 NOTE — TELEPHONE ENCOUNTER
Requested medication(s) are due for refill today: Yes  Patient has already received a courtesy refill: No  Other reason request has been forwarded to provider: ave

## 2023-06-07 DIAGNOSIS — I15.8 OTHER SECONDARY HYPERTENSION: ICD-10-CM

## 2023-06-07 DIAGNOSIS — E78.5 DYSLIPIDEMIA: ICD-10-CM

## 2023-06-07 DIAGNOSIS — I10 ESSENTIAL HYPERTENSION: ICD-10-CM

## 2023-06-07 DIAGNOSIS — E78.2 MIXED HYPERLIPIDEMIA: ICD-10-CM

## 2023-06-07 RX ORDER — METOPROLOL SUCCINATE 25 MG/1
25 TABLET, EXTENDED RELEASE ORAL DAILY
Qty: 90 TABLET | Refills: 0 | Status: SHIPPED | OUTPATIENT
Start: 2023-06-07

## 2023-06-07 RX ORDER — ATORVASTATIN CALCIUM 40 MG/1
40 TABLET, FILM COATED ORAL DAILY
Qty: 90 TABLET | Refills: 0 | Status: SHIPPED | OUTPATIENT
Start: 2023-06-07

## 2023-06-07 RX ORDER — LISINOPRIL 5 MG/1
5 TABLET ORAL DAILY
Qty: 90 TABLET | Refills: 0 | Status: SHIPPED | OUTPATIENT
Start: 2023-06-07

## 2023-06-07 RX ORDER — CLOPIDOGREL BISULFATE 75 MG/1
75 TABLET ORAL DAILY
Qty: 90 TABLET | Refills: 0 | Status: SHIPPED | OUTPATIENT
Start: 2023-06-07

## 2023-06-27 ENCOUNTER — OFFICE VISIT (OUTPATIENT)
Dept: ENDOCRINOLOGY | Facility: CLINIC | Age: 53
End: 2023-06-27
Payer: COMMERCIAL

## 2023-06-27 VITALS
DIASTOLIC BLOOD PRESSURE: 84 MMHG | HEIGHT: 74 IN | SYSTOLIC BLOOD PRESSURE: 120 MMHG | BODY MASS INDEX: 27.31 KG/M2 | HEART RATE: 69 BPM | WEIGHT: 212.8 LBS

## 2023-06-27 DIAGNOSIS — E78.5 HYPERLIPIDEMIA, UNSPECIFIED HYPERLIPIDEMIA TYPE: ICD-10-CM

## 2023-06-27 DIAGNOSIS — E11.65 TYPE 2 DIABETES MELLITUS WITH HYPERGLYCEMIA, WITHOUT LONG-TERM CURRENT USE OF INSULIN (HCC): Primary | ICD-10-CM

## 2023-06-27 DIAGNOSIS — E55.9 VITAMIN D DEFICIENCY: ICD-10-CM

## 2023-06-27 DIAGNOSIS — E11.42 TYPE 2 DIABETES MELLITUS WITH DIABETIC POLYNEUROPATHY, WITHOUT LONG-TERM CURRENT USE OF INSULIN (HCC): ICD-10-CM

## 2023-06-27 DIAGNOSIS — B35.1 ONYCHOMYCOSIS: ICD-10-CM

## 2023-06-27 DIAGNOSIS — I10 ESSENTIAL HYPERTENSION: ICD-10-CM

## 2023-06-27 PROCEDURE — 99204 OFFICE O/P NEW MOD 45 MIN: CPT | Performed by: INTERNAL MEDICINE

## 2023-06-27 RX ORDER — BLOOD SUGAR DIAGNOSTIC
STRIP MISCELLANEOUS
Qty: 100 STRIP | Refills: 1 | Status: SHIPPED | OUTPATIENT
Start: 2023-06-27

## 2023-06-27 RX ORDER — BLOOD-GLUCOSE METER
EACH MISCELLANEOUS
Qty: 1 KIT | Refills: 0 | Status: SHIPPED | OUTPATIENT
Start: 2023-06-27

## 2023-06-27 RX ORDER — LANCETS 33 GAUGE
EACH MISCELLANEOUS
Qty: 100 EACH | Refills: 1 | Status: SHIPPED | OUTPATIENT
Start: 2023-06-27

## 2023-06-27 NOTE — PROGRESS NOTES
Judson Mitchell 46 y o  male MRN: [id number]    Encounter: [id number]      Assessment/Plan     Problem List Items Addressed This Visit        Endocrine    Type 2 diabetes mellitus with diabetic polyneuropathy, without long-term current use of insulin ([facility] [address] )    Relevant Orders    Ambulatory referral to Podiatry    Type 2 diabetes mellitus with hyperglycemia, without long-term current use of insulin ([facility] [address] ) - Primary       Lab Results   Component Value Date    HGBA1C 10 5 (H) 04/06/2014   No recent labs or fingersticks available-for now we will gather information, ordered fasting labs and A1c today  Also check urine microalbumin to creatinine ratio  Referred for medical nutrition therapy    Advised to start checking fingersticks at least twice a day and send over log in 2 weeks     depending on the labs will be started on metformin, given his history of CAD we will consider GLP-1 or SGLT2 inhibitor in the future           Relevant Medications    Blood Glucose Monitoring Suppl (OneTouch Verio) w/Device KIT    glucose blood (OneTouch Verio) test strip    OneTouch Delica Lancets 07A MISC    Other Relevant Orders    Comprehensive metabolic panel Lab Collect    HEMOGLOBIN A1C W/ EAG ESTIMATION Lab Collect    Albumin / creatinine urine ratio    Ambulatory referral to Diabetic Education       Cardiovascular and Mediastinum    Essential hypertension     Blood pressure at goal-we will check urine microalbumin to creatinine ratio         Relevant Orders    Albumin / creatinine urine ratio    Ambulatory referral to Diabetic Education       Musculoskeletal and Integument    Onychomycosis    Relevant Orders    Ambulatory referral to Podiatry       Other    Hyperlipidemia     He is on statins, will check fasting lipid profile         Relevant Orders    Lipid panel Lab Collect Lab Collect    Ambulatory referral to Diabetic Education    Vitamin D deficiency     Not on any supplementations-we will check vitamin D 25-hydroxy Relevant Orders    Vitamin D 25 hydroxy Lab Collect       CC: Diabetes    History of Present Illness     HPI:  77-year-old male here for evaluation of type 2 diabetes  He was diagnosed with  Type 2 DM  In 2014 - was treated with metformin for a year and stopped on his own  He has history of CAD s/p MI  in 2014 - s/p stent - and was diagnosed with diabetes during the hospitalization for MI    Has been seeing PCP - labs last done in 2021 -does not recall glucose or A1c  Not feeling well for the past few month after having Lyme's   C/o tingling in feet   No polyuria , polydipsia , no blurry vision , weight stable   Last eye exam sept 2022      has not been checking F S   No fatigue     Review of Systems    Historical Information   Past Medical History:   Diagnosis Date   • Chest pain    • History of cardiac cath    • Lyme disease      Past Surgical History:   Procedure Laterality Date   • NOSE SURGERY       Social History   Social History     Substance and Sexual Activity   Alcohol Use Yes    Comment: socail     Social History     Substance and Sexual Activity   Drug Use No     Social History     Tobacco Use   Smoking Status Never   Smokeless Tobacco Current   • Types: Chew     Family History:   Family History   Problem Relation Age of Onset   • Thyroid disease unspecified Mother    • Coronary artery disease Father    • Cancer Sister        Meds/Allergies   Current Outpatient Medications   Medication Sig Dispense Refill   • aspirin 81 MG tablet Take 1 tablet by mouth daily     • atorvastatin (LIPITOR) 40 mg tablet Take 1 tablet (40 mg total) by mouth daily 90 tablet 0   • Blood Glucose Monitoring Suppl (OneTouch Verio) w/Device KIT Once 1 kit 0   • clopidogrel (PLAVIX) 75 mg tablet Take 1 tablet (75 mg total) by mouth daily 90 tablet 0   • glucose blood (OneTouch Verio) test strip Use as instructed 2 day 100 strip 1   • lisinopril (ZESTRIL) 5 mg tablet Take 1 tablet (5 mg total) by mouth daily 90 tablet 0   • "metoprolol succinate (TOPROL-XL) 25 mg 24 hr tablet Take 1 tablet (25 mg total) by mouth daily 90 tablet 0   • OneTouch Delica Lancets 96B MISC Check twice a day 100 each 1     No current facility-administered medications for this visit  Allergies   Allergen Reactions   • Penicillins        Objective   Vitals: Blood pressure 120/84, pulse 69, height 6' 2\" (1 88 m), weight 96 5 kg (212 lb 12 8 oz)  Physical Exam  Vitals reviewed  Constitutional:       General: He is not in acute distress  Appearance: Normal appearance  He is not ill-appearing, toxic-appearing or diaphoretic  HENT:      Head: Normocephalic and atraumatic  Eyes:      General: No scleral icterus  Extraocular Movements: Extraocular movements intact  Cardiovascular:      Rate and Rhythm: Normal rate and regular rhythm  Pulses: no weak pulses          Dorsalis pedis pulses are 2+ on the right side and 2+ on the left side  Heart sounds: Normal heart sounds  No murmur heard  Pulmonary:      Effort: Pulmonary effort is normal  No respiratory distress  Breath sounds: Normal breath sounds  No wheezing or rales  Abdominal:      General: There is no distension  Palpations: Abdomen is soft  Tenderness: There is no abdominal tenderness  Musculoskeletal:      Cervical back: Neck supple  Right lower leg: No edema  Left lower leg: No edema  Feet:      Right foot:      Skin integrity: Callus and dry skin present  No ulcer, skin breakdown, erythema or warmth  Left foot:      Skin integrity: Callus and dry skin present  No ulcer, skin breakdown, erythema or warmth  Lymphadenopathy:      Cervical: No cervical adenopathy  Skin:     General: Skin is warm and dry  Comments: Onychomycosis big toes    Neurological:      General: No focal deficit present  Mental Status: He is alert and oriented to person, place, and time        Gait: Gait normal    Psychiatric:         Mood and Affect: Mood " "normal          Behavior: Behavior normal          Thought Content: Thought content normal          Judgment: Judgment normal        Patient's shoes and socks removed  Right Foot/Ankle   Right Foot Inspection  Skin Exam: skin normal, skin intact, dry skin, callus and callus  No warmth, no erythema, no maceration, no abnormal color, no pre-ulcer and no ulcer  Toe Exam: No swelling, no tenderness, erythema and  no right toe deformity    Sensory   Vibration: diminished  Monofilament testing: intact    Vascular  The right DP pulse is 2+  Left Foot/Ankle  Left Foot Inspection  Skin Exam: skin normal, skin intact, dry skin and callus  No warmth, no erythema, no maceration, normal color, no pre-ulcer and no ulcer  Toe Exam: No swelling, no tenderness, no erythema and no left toe deformity  Sensory   Vibration: diminished  Monofilament testing: intact    Vascular  The left DP pulse is 2+  Assign Risk Category  No deformity present  No loss of protective sensation  No weak pulses  Risk: 0    The history was obtained from the review of the chart, patient  Lab Results:            Portions of the record may have been created with voice recognition software  Occasional wrong word or \"sound a like\" substitutions may have occurred due to the inherent limitations of voice recognition software  Read the chart carefully and recognize, using context, where substitutions have occurred    "

## 2023-06-27 NOTE — ASSESSMENT & PLAN NOTE
Lab Results   Component Value Date    HGBA1C 10 5 (H) 04/06/2014   No recent labs or fingersticks available-for now we will gather information, ordered fasting labs and A1c today  Also check urine microalbumin to creatinine ratio  Referred for medical nutrition therapy    Advised to start checking fingersticks at least twice a day and send over log in 2 weeks     depending on the labs will be started on metformin, given his history of CAD we will consider GLP-1 or SGLT2 inhibitor in the future

## 2023-08-31 DIAGNOSIS — E78.2 MIXED HYPERLIPIDEMIA: ICD-10-CM

## 2023-08-31 RX ORDER — ATORVASTATIN CALCIUM 40 MG/1
40 TABLET, FILM COATED ORAL DAILY
Qty: 90 TABLET | Refills: 0 | Status: SHIPPED | OUTPATIENT
Start: 2023-08-31

## 2023-11-03 DIAGNOSIS — E78.5 DYSLIPIDEMIA: ICD-10-CM

## 2023-11-03 DIAGNOSIS — I15.8 OTHER SECONDARY HYPERTENSION: ICD-10-CM

## 2023-11-03 DIAGNOSIS — I10 ESSENTIAL HYPERTENSION: ICD-10-CM

## 2023-11-03 DIAGNOSIS — E78.2 MIXED HYPERLIPIDEMIA: ICD-10-CM

## 2023-11-04 RX ORDER — CLOPIDOGREL BISULFATE 75 MG/1
75 TABLET ORAL DAILY
Qty: 30 TABLET | Refills: 0 | Status: SHIPPED | OUTPATIENT
Start: 2023-11-04

## 2023-11-04 RX ORDER — ATORVASTATIN CALCIUM 40 MG/1
40 TABLET, FILM COATED ORAL DAILY
Qty: 30 TABLET | Refills: 0 | Status: SHIPPED | OUTPATIENT
Start: 2023-11-04

## 2023-11-04 RX ORDER — METOPROLOL SUCCINATE 25 MG/1
25 TABLET, EXTENDED RELEASE ORAL DAILY
Qty: 30 TABLET | Refills: 0 | Status: SHIPPED | OUTPATIENT
Start: 2023-11-04

## 2023-11-04 RX ORDER — LISINOPRIL 5 MG/1
5 TABLET ORAL DAILY
Qty: 30 TABLET | Refills: 0 | Status: SHIPPED | OUTPATIENT
Start: 2023-11-04

## 2023-11-28 ENCOUNTER — OFFICE VISIT (OUTPATIENT)
Dept: CARDIOLOGY CLINIC | Facility: MEDICAL CENTER | Age: 53
End: 2023-11-28
Payer: COMMERCIAL

## 2023-11-28 VITALS
SYSTOLIC BLOOD PRESSURE: 128 MMHG | BODY MASS INDEX: 27.34 KG/M2 | HEIGHT: 74 IN | DIASTOLIC BLOOD PRESSURE: 80 MMHG | HEART RATE: 84 BPM | OXYGEN SATURATION: 95 % | WEIGHT: 213 LBS

## 2023-11-28 DIAGNOSIS — I25.10 CORONARY ARTERY DISEASE INVOLVING NATIVE CORONARY ARTERY OF NATIVE HEART WITHOUT ANGINA PECTORIS: ICD-10-CM

## 2023-11-28 DIAGNOSIS — E78.2 MIXED HYPERLIPIDEMIA: Primary | ICD-10-CM

## 2023-11-28 PROCEDURE — 99204 OFFICE O/P NEW MOD 45 MIN: CPT | Performed by: INTERNAL MEDICINE

## 2023-11-28 NOTE — PROGRESS NOTES
Cardiology   [facility] 46 y.o. male MRN: [id number]        Impression:  1. Non ST elevation MI with PCI to Diagonal branch - doing well. 2. Dyslipidemia - on statin. Recommendations:  1. Continue current medications. 2. Check fasting lipid panel and complete metabolic profile. 3. Follow up in one year. HPI: [facility] is a 46y.o. year old male with h/o myocardial infarction presents for follow up after 4 years. No chest pain or shortness of breath. No further palpitations. Has been feeling well. Review of Systems   Constitutional: Negative. HENT: Negative. Eyes: Negative. Respiratory:  Negative for chest tightness and shortness of breath. Cardiovascular:  Negative for chest pain, palpitations and leg swelling. Gastrointestinal: Negative. Endocrine: Negative. Genitourinary: Negative. Musculoskeletal: Negative. Skin: Negative. Allergic/Immunologic: Negative. Neurological: Negative. Hematological: Negative. Psychiatric/Behavioral: Negative. All other systems reviewed and are negative. Past Medical History:   Diagnosis Date    Chest pain     History of cardiac cath     Lyme disease      Past Surgical History:   Procedure Laterality Date    NOSE SURGERY       Social History     Substance and Sexual Activity   Alcohol Use Yes    Comment: socail     Social History     Substance and Sexual Activity   Drug Use No     Social History     Tobacco Use   Smoking Status Never   Smokeless Tobacco Current    Types: Chew     Family History   Problem Relation Age of Onset    Thyroid disease unspecified Mother     Coronary artery disease Father     Cancer Sister        Allergies:   Allergies   Allergen Reactions    Penicillins        Medications:     Current Outpatient Medications:     aspirin 81 MG tablet, Take 1 tablet by mouth daily, Disp: , Rfl:     atorvastatin (LIPITOR) 40 mg tablet, Take 1 tablet (40 mg total) by mouth daily, Disp: 30 tablet, Rfl: 0    Blood Glucose Monitoring Suppl (OneTouch Verio) w/Device KIT, Once, Disp: 1 kit, Rfl: 0    clopidogrel (PLAVIX) 75 mg tablet, Take 1 tablet (75 mg total) by mouth daily, Disp: 30 tablet, Rfl: 0    glucose blood (OneTouch Verio) test strip, Use as instructed 2.day, Disp: 100 strip, Rfl: 1    lisinopril (ZESTRIL) 5 mg tablet, Take 1 tablet (5 mg total) by mouth daily, Disp: 30 tablet, Rfl: 0    metoprolol succinate (TOPROL-XL) 25 mg 24 hr tablet, Take 1 tablet (25 mg total) by mouth daily, Disp: 30 tablet, Rfl: 0    OneTouch Delica Lancets 87M MISC, Check twice a day, Disp: 100 each, Rfl: 1      Wt Readings from Last 3 Encounters:   11/28/23 96.6 kg (213 lb)   06/27/23 96.5 kg (212 lb 12.8 oz)   12/22/21 102 kg (224 lb)     Temp Readings from Last 3 Encounters:   03/23/15 (!) 97.3 °F (36.3 °C)     BP Readings from Last 3 Encounters:   11/28/23 128/80   06/27/23 120/84   12/22/21 129/83     Pulse Readings from Last 3 Encounters:   11/28/23 84   06/27/23 69   11/01/21 90         Physical Exam  HENT:      Head: Atraumatic. Mouth/Throat:      Mouth: Mucous membranes are moist.   Eyes:      Extraocular Movements: Extraocular movements intact. Cardiovascular:      Rate and Rhythm: Normal rate and regular rhythm. Heart sounds: Normal heart sounds. Pulmonary:      Effort: Pulmonary effort is normal.      Breath sounds: Normal breath sounds. Abdominal:      General: Abdomen is flat. Musculoskeletal:         General: Normal range of motion. Cervical back: Normal range of motion. Skin:     General: Skin is warm. Neurological:      General: No focal deficit present. Mental Status: He is alert and oriented to person, place, and time.    Psychiatric:         Mood and Affect: Mood normal.         Behavior: Behavior normal.           Laboratory Studies:  CMP:  Lab Results   Component Value Date     05/15/2014    K 4.4 03/02/2017     03/02/2017    CO2 29 03/02/2017    ANIONGAP 9 05/15/2014    BUN 21 03/02/2017    CREATININE 1.07 03/02/2017    GLUCOSE 103 05/15/2014    AST 9 03/02/2017    ALT 45 03/02/2017    BILITOT 0.8 04/05/2014    EGFR >60.0 03/02/2017       Lipid Profile:   Lab Results   Component Value Date    CHOL 157 04/07/2014     Lab Results   Component Value Date    HDL 51 03/02/2017     Lab Results   Component Value Date    LDLCALC 90 03/02/2017     Lab Results   Component Value Date    TRIG 162 (H) 03/02/2017

## 2023-11-28 NOTE — PATIENT INSTRUCTIONS
Recommendations:  1. Continue current medications. 2. Check fasting lipid panel and complete metabolic profile. 3. Follow up in one year.

## 2023-12-11 DIAGNOSIS — E78.2 MIXED HYPERLIPIDEMIA: ICD-10-CM

## 2023-12-11 DIAGNOSIS — E78.5 DYSLIPIDEMIA: ICD-10-CM

## 2023-12-11 DIAGNOSIS — I10 ESSENTIAL HYPERTENSION: ICD-10-CM

## 2023-12-11 DIAGNOSIS — I15.8 OTHER SECONDARY HYPERTENSION: ICD-10-CM

## 2023-12-11 RX ORDER — LISINOPRIL 5 MG/1
5 TABLET ORAL DAILY
Qty: 90 TABLET | Refills: 2 | Status: SHIPPED | OUTPATIENT
Start: 2023-12-11

## 2023-12-11 RX ORDER — ATORVASTATIN CALCIUM 40 MG/1
40 TABLET, FILM COATED ORAL DAILY
Qty: 90 TABLET | Refills: 2 | Status: SHIPPED | OUTPATIENT
Start: 2023-12-11

## 2023-12-11 RX ORDER — METOPROLOL SUCCINATE 25 MG/1
25 TABLET, EXTENDED RELEASE ORAL DAILY
Qty: 90 TABLET | Refills: 2 | Status: SHIPPED | OUTPATIENT
Start: 2023-12-11

## 2023-12-11 RX ORDER — CLOPIDOGREL BISULFATE 75 MG/1
75 TABLET ORAL DAILY
Qty: 90 TABLET | Refills: 2 | Status: SHIPPED | OUTPATIENT
Start: 2023-12-11

## 2024-01-03 ENCOUNTER — OFFICE VISIT (OUTPATIENT)
Dept: DERMATOLOGY | Facility: CLINIC | Age: 54
End: 2024-01-03
Payer: COMMERCIAL

## 2024-01-03 VITALS — WEIGHT: 220 LBS | HEIGHT: 74 IN | BODY MASS INDEX: 28.23 KG/M2 | TEMPERATURE: 97.3 F

## 2024-01-03 DIAGNOSIS — L85.3 XEROSIS OF SKIN: ICD-10-CM

## 2024-01-03 DIAGNOSIS — D18.01 CHERRY ANGIOMA: ICD-10-CM

## 2024-01-03 DIAGNOSIS — Z85.828 HISTORY OF BASAL CELL CANCER: ICD-10-CM

## 2024-01-03 DIAGNOSIS — L81.4 LENTIGO: ICD-10-CM

## 2024-01-03 DIAGNOSIS — D22.9 MULTIPLE MELANOCYTIC NEVI: Primary | ICD-10-CM

## 2024-01-03 DIAGNOSIS — D48.5 NEOPLASM OF UNCERTAIN BEHAVIOR OF SKIN: ICD-10-CM

## 2024-01-03 DIAGNOSIS — L82.1 SEBORRHEIC KERATOSIS: ICD-10-CM

## 2024-01-03 PROCEDURE — 11102 TANGNTL BX SKIN SINGLE LES: CPT | Performed by: DERMATOLOGY

## 2024-01-03 PROCEDURE — 99204 OFFICE O/P NEW MOD 45 MIN: CPT | Performed by: DERMATOLOGY

## 2024-01-03 PROCEDURE — 88305 TISSUE EXAM BY PATHOLOGIST: CPT | Performed by: STUDENT IN AN ORGANIZED HEALTH CARE EDUCATION/TRAINING PROGRAM

## 2024-01-03 PROCEDURE — 88342 IMHCHEM/IMCYTCHM 1ST ANTB: CPT | Performed by: STUDENT IN AN ORGANIZED HEALTH CARE EDUCATION/TRAINING PROGRAM

## 2024-01-03 NOTE — PATIENT INSTRUCTIONS
"MELANOCYTIC NEVI (\"Moles\")    Assessment and Plan:  Based on a thorough discussion of this condition and the management approach to it (including a comprehensive discussion of the known risks, side effects and potential benefits of treatment), the patient (family) agrees to implement the following specific plan:  When outside we recommend using a wide brim hat, sunglasses, long sleeve and pants, sunscreen with SPF 30+ with reapplication every 2 hours, or SPF specific clothing   Benign, reassured  Annual skin check     Melanocytic Nevi  Melanocytic nevi (\"moles\") are tan or brown, raised or flat areas of the skin which have an increased number of melanocytes. Melanocytes are the cells in our body which make pigment and account for skin color.    Some moles are present at birth (I.e., \"congenital nevi\"), while others come up later in life (i.e., \"acquired nevi\").  The sun can stimulate the body to make more moles.  Sunburns are not the only thing that triggers more moles.  Chronic sun exposure can do it too.     Clinically distinguishing a healthy mole from melanoma may be difficult, even for experienced dermatologists. The \"ABCDE's\" of moles have been suggested as a means of helping to alert a person to a suspicious mole and the possible increased risk of melanoma.  The suggestions for raising alert are as follows:    Asymmetry: Healthy moles tend to be symmetric, while melanomas are often asymmetric.  Asymmetry means if you draw a line through the mole, the two halves do not match in color, size, shape, or surface texture. Asymmetry can be a result of rapid enlargement of a mole, the development of a raised area on a previously flat lesion, scaling, ulceration, bleeding or scabbing within the mole.  Any mole that starts to demonstrate \"asymmetry\" should be examined promptly by a board certified dermatologist.     Border: Healthy moles tend to have discrete, even borders.  The border of a melanoma often blends into " "the normal skin and does not sharply delineate the mole from normal skin.  Any mole that starts to demonstrate \"uneven borders\" should be examined promptly by a board certified dermatologist.     Color: Healthy moles tend to be one color throughout.  Melanomas tend to be made up of different colors ranging from dark black, blue, white, or red.  Any mole that demonstrates a color change should be examined promptly by a board certified dermatologist.     Diameter: Healthy moles tend to be smaller than 0.6 cm in size; an exception are \"congenital nevi\" that can be larger.  Melanomas tend to grow and can often be greater than 0.6 cm (1/4 of an inch, or the size of a pencil eraser). This is only a guideline, and many normal moles may be larger than 0.6 cm without being unhealthy.  Any mole that starts to change in size (small to bigger or bigger to smaller) should be examined promptly by a board certified dermatologist.     Evolving: Healthy moles tend to \"stay the same.\"  Melanomas may often show signs of change or evolution such as a change in size, shape, color, or elevation.  Any mole that starts to itch, bleed, crust, burn, hurt, or ulcerate or demonstrate a change or evolution should be examined promptly by a board certified dermatologist.        LENTIGO    Assessment and Plan:  Based on a thorough discussion of this condition and the management approach to it (including a comprehensive discussion of the known risks, side effects and potential benefits of treatment), the patient (family) agrees to implement the following specific plan:  When outside we recommend using a wide brim hat, sunglasses, long sleeve and pants, sunscreen with SPF 30+ with reapplication every 2 hours, or SPF specific clothing       What is a lentigo?  A lentigo is a pigmented flat or slightly raised lesion with a clearly defined edge. Unlike an ephelis (freckle), it does not fade in the winter months. There are several kinds of lentigo.  The " name lentigo originally referred to its appearance resembling a small lentil. The plural of lentigo is lentigines, although “lentigos” is also in common use.    Who gets lentigines?  Lentigines can affect males and females of all ages and races. Solar lentigines are especially prevalent in fair skinned adults. Lentigines associated with syndromes are present at birth or arise during childhood.    What causes lentigines?  Common forms of lentigo are due to exposure to ultraviolet radiation:  Sun damage including sunburn   Indoor tanning   Phototherapy, especially photochemotherapy (PUVA)    Ionizing radiation, eg radiation therapy, can also cause lentigines.  Several familial syndromes associated with widespread lentigines originate from mutations in Juanjose-MAP kinase, mTOR signaling and PTEN pathways.    What is the treatment for lentigines?  Most lentigines are left alone. Attempts to lighten them may not be successful. The following approaches are used:  SPF 50+ broad-spectrum sunscreen   Hydroquinone bleaching cream   Alpha hydroxy acids   Vitamin C   Retinoids   Azelaic acid   Chemical peels  Individual lesions can be permanently removed using:  Cryotherapy   Intense pulsed light   Pigment lasers    How can lentigines be prevented?  Lentigines associated with exposure ultraviolet radiation can be prevented by very careful sun protection. Clothing is more successful at preventing new lentigines than are sunscreens.    What is the outlook for lentigines?  Lentigines usually persist. They may increase in number with age and sun exposure. Some in sun-protected sites may fade and disappear.    BRODERICK ANGIOMAS    Assessment and Plan:  Based on a thorough discussion of this condition and the management approach to it (including a comprehensive discussion of the known risks, side effects and potential benefits of treatment), the patient (family) agrees to implement the following specific plan:  Monitor for changes  Benign,  "reassured      Assessment and Plan:    Cherry angioma, also known as Kennedy de Manny spots, are benign vascular skin lesions. A \"cherry angioma\" is a firm red, blue or purple papule, 0.1-1 cm in diameter. When thrombosed, they can appear black in colour until evaluated with a dermatoscope when the red or purple colour is more easily seen. Cherry angioma may develop on any part of the body but most often appear on the scalp, face, lips and trunk.  An angioma is due to proliferating endothelial cells; these are the cells that line the inside of a blood vessel.    Angiomas can arise in early life or later in life; the most common type of angioma is a cherry angioma.  Cherry angiomas are very common in males and females of any age or race. They are more noticeable in white skin than in skin of colour. They markedly increase in number from about the age of 40. There may be a family history of similar lesions. Eruptive cherry angiomas have been rarely reported to be associated with internal malignancy. The cause of angiomas is unknown. Genetic analysis of cherry angiomas has shown that they frequently carry specific somatic missense mutations in the GNAQ and GNA11 (Q209H) genes, which are involved in other vascular and melanocytic proliferations.      SEBORRHEIC KERATOSIS; NON-INFLAMED  Assessment and Plan:  Based on a thorough discussion of this condition and the management approach to it (including a comprehensive discussion of the known risks, side effects and potential benefits of treatment), the patient (family) agrees to implement the following specific plan:  Monitor for changes  Benign, reassured      Seborrheic Keratosis  A seborrheic keratosis is a harmless warty spot that appears during adult life as a common sign of skin aging.  Seborrheic keratoses can arise on any area of skin, covered or uncovered, with the usual exception of the palms and soles. They do not arise from mucous membranes. Seborrheic " "keratoses can have highly variable appearance.      Seborrheic keratoses are extremely common. It has been estimated that over 90% of adults over the age of 60 years have one or more of them. They occur in males and females of all races, typically beginning to erupt in the 30s or 40s. They are uncommon under the age of 20 years.  The precise cause of seborrhoeic keratoses is not known.  Seborrhoeic keratoses are considered degenerative in nature. As time goes by, seborrheic keratoses tend to become more numerous. Some people inherit a tendency to develop a very large number of them; some people may have hundreds of them.      There is no easy way to remove multiple lesions on a single occasion.  Unless a specific lesion is \"inflamed\" and is causing pain or stinging/burning or is bleeding, most insurance companies do not authorize treatment.    XEROSIS (\"DRY SKIN\")    Assessment and Plan:  Based on a thorough discussion of this condition and the management approach to it (including a comprehensive discussion of the known risks, side effects and potential benefits of treatment), the patient (family) agrees to implement the following specific plan:  Use moisturizer like Eucerin,Cerave or Aveeno Cream 3 times a day for the dry skin            Dry skin refers to skin that feels dry to touch. Dry skin has a dull surface with a rough, scaly quality. The skin is less pliable and cracked. When dryness is severe, the skin may become inflamed and fissured.  Although any body site can be dry, dry skin tends to affect the shins more than any other site.    Dry skin is lacking moisture in the outer horny cell layer (stratum corneum) and this results in cracks in the skin surface.  Dry skin is also called xerosis, xeroderma or asteatosis (lack of fat).  It can affect males and females of all ages. There is some racial variability in water and lipid content of the skin.  Dry skin that starts in early childhood may be one of about " "20 types of ichthyosis (fish-scale skin). There is often a family history of dry skin.   Dry skin is commonly seen in people with atopic dermatitis.  Nearly everyone > 60 years has dry skin.    Dry skin that begins later may be seen in people with certain diseases and conditions.  Postmenopausal women  Hypothyroidism  Chronic renal disease   Malnutrition and weight loss   Subclinical dermatitis   Treatment with certain drugs such as oral retinoids, diuretics and epidermal growth factor receptor inhibitors      What is the treatment for dry skin?  The mainstay of treatment of dry skin and ichthyosis is moisturisers/emollients. They should be applied liberally and often enough to:  Reduce itch   Improve the barrier function   Prevent entry of irritants, bacteria   Reduce transepidermal water loss.      How can dry skin be prevented?  Eliminate aggravating factors:  Reduce the frequency of bathing.   A humidifier in winter and air conditioner in summer   Compare having a short shower with a prolonged soak in a bath.   Use lukewarm, not hot, water.   Replace standard soap with a substitute such as a synthetic detergent cleanser, water-miscible emollient, bath oil, anti-pruritic tar oil, colloidal oatmeal etc.   Apply an emollient liberally and often, particularly shortly after bathing, and when itchy. The drier the skin, the thicker this should be, especially on the hands.    What is the outlook for dry skin?  A tendency to dry skin may persist life-long, or it may improve once contributing factors are controlled.     NEOPLASM OF UNCERTAIN BEHAVIOR OF SKIN    Assessment and Plan:  I have discussed with the patient that a sample of skin via a \"skin biopsy” would be potentially helpful to further make a specific diagnosis under the microscope.  Based on a thorough discussion of this condition and the management approach to it (including a comprehensive discussion of the known risks, side effects and potential benefits of " "treatment), the patient (family) agrees to implement the following specific plan:    Procedure:  Skin Biopsy.  After a thorough discussion of treatment options and risk/benefits/alternatives (including but not limited to local pain, scarring, dyspigmentation, blistering, possible superinfection, and inability to confirm a diagnosis via histopathology), verbal and written consent were obtained and portion of the rash was biopsied for tissue sample.  See below for consent that was obtained from patient and subsequent Procedure Note.   PROCEDURE TANGENTIAL (SHAVE) BIOPSY NOTE:      After obtaining informed consent  at which time there was a discussion about the purpose of biopsy  and low risks of infection and bleeding.  The area was prepped and draped in the usual fashion. Anesthesia was obtained with 1% lidocaine with epinephrine. A shave biopsy to an appropriate sampling depth was obtained by Shave (Dermablade or 15 blade) The resulting wound was covered with surgical ointment and bandaged appropriately.     The patient tolerated the procedure well without complications and was without signs of functional compromise.      Specimen has been sent for review by Dermatopathology.    Standard post-procedure care has been explained and has been included in written form within the patient's copy of Informed Consent.    INFORMED CONSENT DISCUSSION AND POST-OPERATIVE INSTRUCTIONS FOR PATIENT    I.  RATIONALE FOR PROCEDURE  I understand that a skin biopsy allows the Dermatologist to test a lesion or rash under the microscope to obtain a diagnosis.  It usually involves numbing the area with numbing medication and removing a small piece of skin; sometimes the area will be closed with sutures. In this specific procedure, sutures are not usually needed.  If any sutures are placed, then they are usually need to be removed in 2 weeks or less.    I understand that my Dermatologist recommends that a skin \"shave\" biopsy be performed " "today.  A local anesthetic, similar to the kind that a dentist uses when filling a cavity, will be injected with a very small needle into the skin area to be sampled.  The injected skin and tissue underneath \"will go to sleep” and become numb so no pain should be felt afterwards.  An instrument shaped like a tiny \"razor blade\" (shave biopsy instrument) will be used to cut a small piece of tissue and skin from the area so that a sample of tissue can be taken and examined more closely under the microscope.  A slight amount of bleeding will occur, but it will be stopped with direct pressure and a pressure bandage and any other appropriate methods.  I understands that a scar will form where the wound was created.  Surgical ointment will be applied to help protect the wound.  Sutures are not usually needed.    II.  RISKS AND POTENTIAL COMPLICATIONS   I understand the risks and potential complications of a skin biopsy include but are not limited to the following:  Bleeding  Infection  Pain  Scar/keloid  Skin discoloration  Incomplete Removal  Recurrence  Nerve Damage/Numbness/Loss of Function  Allergic Reaction to Anesthesia  Biopsies are diagnostic procedures and based on findings additional treatment or evaluation may be required  Loss or destruction of specimen resulting in no additional findings    My Dermatologist has explained to me the nature of the condition, the nature of the procedure, and the benefits to be reasonably expected compared with alternative approaches.  My Dermatologist has discussed the likelihood of major risks or complications of this procedure including the specific risks listed above, such as bleeding, infection, and scarring/keloid.  I understand that a scar is expected after this procedure.  I understand that my physician cannot predict if the scar will form a \"keloid,\" which extends beyond the borders of the wound that is created.  A keloid is a thick, painful, and bumpy scar.  A keloid can " "be difficult to treat, as it does not always respond well to therapy, which includes injecting cortisone directly into the keloid every few weeks.  While this usually reduces the pain and size of the scar, it does not eliminate it.      I understand that photographs may be taken before and after the procedure.  These will be maintained as part of the medical providers confidential records and may not be made available to me.  I further authorize the medical provider to use the photographs for teaching purposes or to illustrate scientific papers, books, or lectures if in his/her judgment, medical research, education, or science may benefit from its use.    I have had an opportunity to fully inquire about the risks and benefits of this procedure and its alternatives.   I have been given ample time and opportunity to ask questions and to seek a second opinion if I wished to do so.  I acknowledge that there have specifically been no guarantees as to the cosmetic results from the procedure.  I am aware that with any procedure there is always the possibility of an unexpected complication.    III. POST-PROCEDURAL CARE (WHAT YOU WILL NEED TO DO \"AFTER THE BIOPSY\" TO OPTIMIZE HEALING)    Keep the area clean and dry.  Try NOT to remove the bandage or get it wet for the first 24 hours.    Gently clean the area and apply surgical ointment (such as Vaseline petrolatum ointment, which is available \"over the counter\" and not a prescription) to the biopsy site for up to 2 weeks straight.  This acts to protect the wound from the outside world.      Sutures are not usually placed in this procedure.  If any sutures were placed, return for suture removal as instructed (generally 1 week for the face, 2 weeks for the body).      Take Acetaminophen (Tylenol) for discomfort, if no contraindications.  Ibuprofen or aspirin could make bleeding worse.    Call our office immediately for signs of infection: fever, chills, increased redness, " warmth, tenderness, discomfort/pain, or pus or foul smell coming from the wound.    WHAT TO DO IF THERE IS ANY BLEEDING?  If a small amount of bleeding is noticed, place a clean cloth over the area and apply firm pressure for ten minutes.  Check the wound after 10 minutes of direct pressure.  If bleeding persists, try one more time for an additional 10 minutes of direct pressure on the area.  If the bleeding becomes heavier or does not stop after the second attempt, or if you have any other questions about this procedure, then please call your [facility]'s Dermatologist by calling [phone] (SKIN).     I hereby acknowledge that I have reviewed and verified the site with my Dermatologist and have requested and authorized my Dermatologist to proceed with the procedure.

## 2024-01-03 NOTE — PROGRESS NOTES
"[facility]atology [facility] Note     Patient Name: Darryl Ibanez  Encounter Date: 1/3/2024     Have you been cared for by a [facility] Dermatologist in the last 3 years and, if so, which description applies to you?    NO.   I am considered a \"new\" patient and must complete all patient intake questions. I am MALE/not capable of bearing children.    REVIEW OF SYSTEMS:  Have you recently had or currently have any of the following? Recent fever or chills? No  Any non-healing wound? YES, left temple; approximately one year   PAST MEDICAL HISTORY:  Have you personally ever had or currently have any of the following?  If \"YES,\" then please provide more detail. Skin cancer (such as Melanoma, Basal Cell Carcinoma, Squamous Cell Carcinoma?  YES, BCC left temple, 2015  Tuberculosis, HIV/AIDS, Hepatitis B or C: No  Radiation Treatment No   HISTORY OF IMMUNOSUPPRESSION:   Do you have a history of any of the following:  Systemic Immunosuppression such as Diabetes, Biologic or Immunotherapy, Chemotherapy, Organ Transplantation, Bone Marrow Transplantation?  YES, Diabetes     Answering \"YES\" requires the addition of the dotphrase \"IMMUNOSUPPRESSED\" as the first diagnosis of the patient's visit.   FAMILY HISTORY:  Any \"first degree relatives\" (parent, brother, sister, or child) with the following?    Skin Cancer, Pancreatic or Other Cancer? YES, Sister: lung cancer   PATIENT EXPERIENCE:    Do you want the Dermatologist to perform a COMPLETE skin exam today including a clinical examination under the \"bra and underwear\" areas?  NO  If necessary, do we have your permission to call and leave a detailed message on your Preferred Phone number that includes your specific medical information?  Yes      Allergies   Allergen Reactions    Penicillins       Current Outpatient Medications:     aspirin 81 MG tablet, Take 1 tablet by mouth daily, Disp: , Rfl:     atorvastatin (LIPITOR) 40 mg tablet, Take 1 tablet (40 mg total) by mouth daily, " "Disp: 90 tablet, Rfl: 2    Blood Glucose Monitoring Suppl (OneTouch Verio) w/Device KIT, Once, Disp: 1 kit, Rfl: 0    clopidogrel (PLAVIX) 75 mg tablet, Take 1 tablet (75 mg total) by mouth daily, Disp: 90 tablet, Rfl: 2    glucose blood (OneTouch Verio) test strip, Use as instructed 2.day, Disp: 100 strip, Rfl: 1    lisinopril (ZESTRIL) 5 mg tablet, Take 1 tablet (5 mg total) by mouth daily, Disp: 90 tablet, Rfl: 2    metoprolol succinate (TOPROL-XL) 25 mg 24 hr tablet, Take 1 tablet (25 mg total) by mouth daily, Disp: 90 tablet, Rfl: 2    OneTouch Delica Lancets 33G MISC, Check twice a day, Disp: 100 each, Rfl: 1          Whom besides the patient is providing clinical information about today's encounter?   NO ADDITIONAL HISTORIAN (patient alone provided history)    Physical Exam and Assessment/Plan by Diagnosis:      MELANOCYTIC NEVI (\"Moles\")    Physical Exam:  Anatomic Location Affected:   Mostly on sun-exposed areas of the trunk and extremities  Morphological Description:  Scattered, 1-4mm round to ovoid, symmetrical-appearing, even bordered, skin colored to dark brown macules/papules, mostly in sun-exposed areas  Pertinent Positives:  Pertinent Negatives:    Additional History of Present Condition:      Assessment and Plan:  Based on a thorough discussion of this condition and the management approach to it (including a comprehensive discussion of the known risks, side effects and potential benefits of treatment), the patient (family) agrees to implement the following specific plan:  When outside we recommend using a wide brim hat, sunglasses, long sleeve and pants, sunscreen with SPF 30+ with reapplication every 2 hours, or SPF specific clothing   Benign, reassured  Annual skin check     Melanocytic Nevi  Melanocytic nevi (\"moles\") are tan or brown, raised or flat areas of the skin which have an increased number of melanocytes. Melanocytes are the cells in our body which make pigment and account for skin " "color.    Some moles are present at birth (I.e., \"congenital nevi\"), while others come up later in life (i.e., \"acquired nevi\").  The sun can stimulate the body to make more moles.  Sunburns are not the only thing that triggers more moles.  Chronic sun exposure can do it too.     Clinically distinguishing a healthy mole from melanoma may be difficult, even for experienced dermatologists. The \"ABCDE's\" of moles have been suggested as a means of helping to alert a person to a suspicious mole and the possible increased risk of melanoma.  The suggestions for raising alert are as follows:    Asymmetry: Healthy moles tend to be symmetric, while melanomas are often asymmetric.  Asymmetry means if you draw a line through the mole, the two halves do not match in color, size, shape, or surface texture. Asymmetry can be a result of rapid enlargement of a mole, the development of a raised area on a previously flat lesion, scaling, ulceration, bleeding or scabbing within the mole.  Any mole that starts to demonstrate \"asymmetry\" should be examined promptly by a board certified dermatologist.     Border: Healthy moles tend to have discrete, even borders.  The border of a melanoma often blends into the normal skin and does not sharply delineate the mole from normal skin.  Any mole that starts to demonstrate \"uneven borders\" should be examined promptly by a board certified dermatologist.     Color: Healthy moles tend to be one color throughout.  Melanomas tend to be made up of different colors ranging from dark black, blue, white, or red.  Any mole that demonstrates a color change should be examined promptly by a board certified dermatologist.     Diameter: Healthy moles tend to be smaller than 0.6 cm in size; an exception are \"congenital nevi\" that can be larger.  Melanomas tend to grow and can often be greater than 0.6 cm (1/4 of an inch, or the size of a pencil eraser). This is only a guideline, and many normal moles may be " "larger than 0.6 cm without being unhealthy.  Any mole that starts to change in size (small to bigger or bigger to smaller) should be examined promptly by a board certified dermatologist.     Evolving: Healthy moles tend to \"stay the same.\"  Melanomas may often show signs of change or evolution such as a change in size, shape, color, or elevation.  Any mole that starts to itch, bleed, crust, burn, hurt, or ulcerate or demonstrate a change or evolution should be examined promptly by a board certified dermatologist.        LENTIGO    Physical Exam:  Anatomic Location Affected:  trunk, arms  Morphological Description:  Light brown macules  Pertinent Positives:  Pertinent Negatives:    Additional History of Present Condition:      Assessment and Plan:  Based on a thorough discussion of this condition and the management approach to it (including a comprehensive discussion of the known risks, side effects and potential benefits of treatment), the patient (family) agrees to implement the following specific plan:  When outside we recommend using a wide brim hat, sunglasses, long sleeve and pants, sunscreen with SPF 30+ with reapplication every 2 hours, or SPF specific clothing       What is a lentigo?  A lentigo is a pigmented flat or slightly raised lesion with a clearly defined edge. Unlike an ephelis (freckle), it does not fade in the winter months. There are several kinds of lentigo.  The name lentigo originally referred to its appearance resembling a small lentil. The plural of lentigo is lentigines, although “lentigos” is also in common use.    Who gets lentigines?  Lentigines can affect males and females of all ages and races. Solar lentigines are especially prevalent in fair skinned adults. Lentigines associated with syndromes are present at birth or arise during childhood.    What causes lentigines?  Common forms of lentigo are due to exposure to ultraviolet radiation:  Sun damage including sunburn   Indoor tanning " "  Phototherapy, especially photochemotherapy (PUVA)    Ionizing radiation, eg radiation therapy, can also cause lentigines.  Several familial syndromes associated with widespread lentigines originate from mutations in Juanjose-MAP kinase, mTOR signaling and PTEN pathways.    What is the treatment for lentigines?  Most lentigines are left alone. Attempts to lighten them may not be successful. The following approaches are used:  SPF 50+ broad-spectrum sunscreen   Hydroquinone bleaching cream   Alpha hydroxy acids   Vitamin C   Retinoids   Azelaic acid   Chemical peels  Individual lesions can be permanently removed using:  Cryotherapy   Intense pulsed light   Pigment lasers    How can lentigines be prevented?  Lentigines associated with exposure ultraviolet radiation can be prevented by very careful sun protection. Clothing is more successful at preventing new lentigines than are sunscreens.    What is the outlook for lentigines?  Lentigines usually persist. They may increase in number with age and sun exposure. Some in sun-protected sites may fade and disappear.    BRODERICK ANGIOMAS    Physical Exam:  Anatomic Location Affected:  trunk  Morphological Description:  Scattered cherry red, 1-4 mm papules.  Pertinent Positives:  Pertinent Negatives:    Additional History of Present Condition:      Assessment and Plan:  Based on a thorough discussion of this condition and the management approach to it (including a comprehensive discussion of the known risks, side effects and potential benefits of treatment), the patient (family) agrees to implement the following specific plan:  Monitor for changes  Benign, reassured      Assessment and Plan:    Cherry angioma, also known as Kennedy de Manny spots, are benign vascular skin lesions. A \"cherry angioma\" is a firm red, blue or purple papule, 0.1-1 cm in diameter. When thrombosed, they can appear black in colour until evaluated with a dermatoscope when the red or purple colour is more " "easily seen. Cherry angioma may develop on any part of the body but most often appear on the scalp, face, lips and trunk.  An angioma is due to proliferating endothelial cells; these are the cells that line the inside of a blood vessel.    Angiomas can arise in early life or later in life; the most common type of angioma is a cherry angioma.  Cherry angiomas are very common in males and females of any age or race. They are more noticeable in white skin than in skin of colour. They markedly increase in number from about the age of 40. There may be a family history of similar lesions. Eruptive cherry angiomas have been rarely reported to be associated with internal malignancy. The cause of angiomas is unknown. Genetic analysis of cherry angiomas has shown that they frequently carry specific somatic missense mutations in the GNAQ and GNA11 (Q209H) genes, which are involved in other vascular and melanocytic proliferations.      SEBORRHEIC KERATOSIS; NON-INFLAMED    Physical Exam:  Anatomic Location Affected:  trunk  Morphological Description:  Flat and raised, waxy, smooth to warty textured, yellow to brownish-grey to dark brown to blackish, discrete, \"stuck-on\" appearing papules.  Pertinent Positives:  Pertinent Negatives:    Additional History of Present Condition:      Assessment and Plan:  Based on a thorough discussion of this condition and the management approach to it (including a comprehensive discussion of the known risks, side effects and potential benefits of treatment), the patient (family) agrees to implement the following specific plan:  Monitor for changes  Benign, reassured      Seborrheic Keratosis  A seborrheic keratosis is a harmless warty spot that appears during adult life as a common sign of skin aging.  Seborrheic keratoses can arise on any area of skin, covered or uncovered, with the usual exception of the palms and soles. They do not arise from mucous membranes. Seborrheic keratoses can have " "highly variable appearance.      Seborrheic keratoses are extremely common. It has been estimated that over 90% of adults over the age of 60 years have one or more of them. They occur in males and females of all races, typically beginning to erupt in the 30s or 40s. They are uncommon under the age of 20 years.  The precise cause of seborrhoeic keratoses is not known.  Seborrhoeic keratoses are considered degenerative in nature. As time goes by, seborrheic keratoses tend to become more numerous. Some people inherit a tendency to develop a very large number of them; some people may have hundreds of them.      There is no easy way to remove multiple lesions on a single occasion.  Unless a specific lesion is \"inflamed\" and is causing pain or stinging/burning or is bleeding, most insurance companies do not authorize treatment.    XEROSIS (\"DRY SKIN\")    Physical Exam:  Anatomic Location Affected:  diffuse  Morphological Description:  xerosis  Pertinent Positives:  Pertinent Negatives:    Additional History of Present Condition:      Assessment and Plan:  Based on a thorough discussion of this condition and the management approach to it (including a comprehensive discussion of the known risks, side effects and potential benefits of treatment), the patient (family) agrees to implement the following specific plan:  Use moisturizer like Eucerin,Cerave or Aveeno Cream 3 times a day for the dry skin            Dry skin refers to skin that feels dry to touch. Dry skin has a dull surface with a rough, scaly quality. The skin is less pliable and cracked. When dryness is severe, the skin may become inflamed and fissured.  Although any body site can be dry, dry skin tends to affect the shins more than any other site.    Dry skin is lacking moisture in the outer horny cell layer (stratum corneum) and this results in cracks in the skin surface.  Dry skin is also called xerosis, xeroderma or asteatosis (lack of fat).  It can affect " males and females of all ages. There is some racial variability in water and lipid content of the skin.  Dry skin that starts in early childhood may be one of about 20 types of ichthyosis (fish-scale skin). There is often a family history of dry skin.   Dry skin is commonly seen in people with atopic dermatitis.  Nearly everyone > 60 years has dry skin.    Dry skin that begins later may be seen in people with certain diseases and conditions.  Postmenopausal women  Hypothyroidism  Chronic renal disease   Malnutrition and weight loss   Subclinical dermatitis   Treatment with certain drugs such as oral retinoids, diuretics and epidermal growth factor receptor inhibitors      What is the treatment for dry skin?  The mainstay of treatment of dry skin and ichthyosis is moisturisers/emollients. They should be applied liberally and often enough to:  Reduce itch   Improve the barrier function   Prevent entry of irritants, bacteria   Reduce transepidermal water loss.      How can dry skin be prevented?  Eliminate aggravating factors:  Reduce the frequency of bathing.   A humidifier in winter and air conditioner in summer   Compare having a short shower with a prolonged soak in a bath.   Use lukewarm, not hot, water.   Replace standard soap with a substitute such as a synthetic detergent cleanser, water-miscible emollient, bath oil, anti-pruritic tar oil, colloidal oatmeal etc.   Apply an emollient liberally and often, particularly shortly after bathing, and when itchy. The drier the skin, the thicker this should be, especially on the hands.    What is the outlook for dry skin?  A tendency to dry skin may persist life-long, or it may improve once contributing factors are controlled.     NEOPLASM OF UNCERTAIN BEHAVIOR OF SKIN    Physical Exam:  (Anatomic Location); (Size and Morphological Description); (Differential Diagnosis):  A; Left temple, inferior to scar; 1 X 0.6 cm crusted pink plaque; Diff Dx: SCC  B; Right upper arm;  "1.5 X 0.4 cm crusted plaque, likely from trauma; will monitor  Pertinent Positives:  Pertinent Negatives:    Additional History of Present Condition:  hx of BCC    Assessment and Plan:  I have discussed with the patient that a sample of skin via a \"skin biopsy” would be potentially helpful to further make a specific diagnosis under the microscope.  Based on a thorough discussion of this condition and the management approach to it (including a comprehensive discussion of the known risks, side effects and potential benefits of treatment), the patient (family) agrees to implement the following specific plan:    Procedure:  Skin Biopsy.  After a thorough discussion of treatment options and risk/benefits/alternatives (including but not limited to local pain, scarring, dyspigmentation, blistering, possible superinfection, and inability to confirm a diagnosis via histopathology), verbal and written consent were obtained and portion of the rash was biopsied for tissue sample.  See below for consent that was obtained from patient and subsequent Procedure Note.   PROCEDURE TANGENTIAL (SHAVE) BIOPSY NOTE:    Performing Physician:    Anatomic Location; Clinical Description with size (cm); Pre-Op Diagnosis:   A; Left temple, inferior to scar; 1 X 0.6 cm crusted pink plaque; Diff Dx: SCC  Post-op diagnosis: Same     Local anesthesia: 1% xylocaine with epi      Topical anesthesia: None    Hemostasis: Electrocautery       After obtaining informed consent  at which time there was a discussion about the purpose of biopsy  and low risks of infection and bleeding.  The area was prepped and draped in the usual fashion. Anesthesia was obtained with 1% lidocaine with epinephrine. A shave biopsy to an appropriate sampling depth was obtained by Shave (Dermablade or 15 blade) The resulting wound was covered with surgical ointment and bandaged appropriately.     The patient tolerated the procedure well without complications and was " "without signs of functional compromise.      Specimen has been sent for review by Dermatopathology.    Standard post-procedure care has been explained and has been included in written form within the patient's copy of Informed Consent.    INFORMED CONSENT DISCUSSION AND POST-OPERATIVE INSTRUCTIONS FOR PATIENT    I.  RATIONALE FOR PROCEDURE  I understand that a skin biopsy allows the Dermatologist to test a lesion or rash under the microscope to obtain a diagnosis.  It usually involves numbing the area with numbing medication and removing a small piece of skin; sometimes the area will be closed with sutures. In this specific procedure, sutures are not usually needed.  If any sutures are placed, then they are usually need to be removed in 2 weeks or less.    I understand that my Dermatologist recommends that a skin \"shave\" biopsy be performed today.  A local anesthetic, similar to the kind that a dentist uses when filling a cavity, will be injected with a very small needle into the skin area to be sampled.  The injected skin and tissue underneath \"will go to sleep” and become numb so no pain should be felt afterwards.  An instrument shaped like a tiny \"razor blade\" (shave biopsy instrument) will be used to cut a small piece of tissue and skin from the area so that a sample of tissue can be taken and examined more closely under the microscope.  A slight amount of bleeding will occur, but it will be stopped with direct pressure and a pressure bandage and any other appropriate methods.  I understands that a scar will form where the wound was created.  Surgical ointment will be applied to help protect the wound.  Sutures are not usually needed.    II.  RISKS AND POTENTIAL COMPLICATIONS   I understand the risks and potential complications of a skin biopsy include but are not limited to the following:  Bleeding  Infection  Pain  Scar/keloid  Skin discoloration  Incomplete Removal  Recurrence  Nerve Damage/Numbness/Loss of " "Function  Allergic Reaction to Anesthesia  Biopsies are diagnostic procedures and based on findings additional treatment or evaluation may be required  Loss or destruction of specimen resulting in no additional findings    My Dermatologist has explained to me the nature of the condition, the nature of the procedure, and the benefits to be reasonably expected compared with alternative approaches.  My Dermatologist has discussed the likelihood of major risks or complications of this procedure including the specific risks listed above, such as bleeding, infection, and scarring/keloid.  I understand that a scar is expected after this procedure.  I understand that my physician cannot predict if the scar will form a \"keloid,\" which extends beyond the borders of the wound that is created.  A keloid is a thick, painful, and bumpy scar.  A keloid can be difficult to treat, as it does not always respond well to therapy, which includes injecting cortisone directly into the keloid every few weeks.  While this usually reduces the pain and size of the scar, it does not eliminate it.      I understand that photographs may be taken before and after the procedure.  These will be maintained as part of the medical providers confidential records and may not be made available to me.  I further authorize the medical provider to use the photographs for teaching purposes or to illustrate scientific papers, books, or lectures if in his/her judgment, medical research, education, or science may benefit from its use.    I have had an opportunity to fully inquire about the risks and benefits of this procedure and its alternatives.   I have been given ample time and opportunity to ask questions and to seek a second opinion if I wished to do so.  I acknowledge that there have specifically been no guarantees as to the cosmetic results from the procedure.  I am aware that with any procedure there is always the possibility of an unexpected " "complication.    III. POST-PROCEDURAL CARE (WHAT YOU WILL NEED TO DO \"AFTER THE BIOPSY\" TO OPTIMIZE HEALING)    Keep the area clean and dry.  Try NOT to remove the bandage or get it wet for the first 24 hours.    Gently clean the area and apply surgical ointment (such as Vaseline petrolatum ointment, which is available \"over the counter\" and not a prescription) to the biopsy site for up to 2 weeks straight.  This acts to protect the wound from the outside world.      Sutures are not usually placed in this procedure.  If any sutures were placed, return for suture removal as instructed (generally 1 week for the face, 2 weeks for the body).      Take Acetaminophen (Tylenol) for discomfort, if no contraindications.  Ibuprofen or aspirin could make bleeding worse.    Call our office immediately for signs of infection: fever, chills, increased redness, warmth, tenderness, discomfort/pain, or pus or foul smell coming from the wound.    WHAT TO DO IF THERE IS ANY BLEEDING?  If a small amount of bleeding is noticed, place a clean cloth over the area and apply firm pressure for ten minutes.  Check the wound after 10 minutes of direct pressure.  If bleeding persists, try one more time for an additional 10 minutes of direct pressure on the area.  If the bleeding becomes heavier or does not stop after the second attempt, or if you have any other questions about this procedure, then please call your [facility] Dermatologist by calling [phone] ([phone]).     I hereby acknowledge that I have reviewed and verified the site with my Dermatologist and have requested and authorized my Dermatologist to proceed with the procedure.                   Scribe Attestation      I,:  Farzana Elder am acting as a scribe while in the presence of the attending physician.:       I,:  Mirtha Schofield MD personally performed the services described in this documentation    as scribed in my presence.:            "

## 2024-01-08 PROCEDURE — 88305 TISSUE EXAM BY PATHOLOGIST: CPT | Performed by: STUDENT IN AN ORGANIZED HEALTH CARE EDUCATION/TRAINING PROGRAM

## 2024-01-08 PROCEDURE — 88342 IMHCHEM/IMCYTCHM 1ST ANTB: CPT | Performed by: STUDENT IN AN ORGANIZED HEALTH CARE EDUCATION/TRAINING PROGRAM

## 2024-01-09 ENCOUNTER — OFFICE VISIT (OUTPATIENT)
Dept: FAMILY MEDICINE CLINIC | Facility: CLINIC | Age: 54
End: 2024-01-09

## 2024-01-09 VITALS
BODY MASS INDEX: 26.87 KG/M2 | HEIGHT: 74 IN | OXYGEN SATURATION: 98 % | DIASTOLIC BLOOD PRESSURE: 70 MMHG | TEMPERATURE: 97.9 F | HEART RATE: 78 BPM | WEIGHT: 209.4 LBS | SYSTOLIC BLOOD PRESSURE: 110 MMHG

## 2024-01-09 DIAGNOSIS — I25.10 CORONARY ARTERY DISEASE INVOLVING NATIVE CORONARY ARTERY OF NATIVE HEART WITHOUT ANGINA PECTORIS: ICD-10-CM

## 2024-01-09 DIAGNOSIS — Z12.11 SCREEN FOR COLON CANCER: ICD-10-CM

## 2024-01-09 DIAGNOSIS — I10 ESSENTIAL HYPERTENSION: ICD-10-CM

## 2024-01-09 DIAGNOSIS — E11.42 TYPE 2 DIABETES MELLITUS WITH DIABETIC POLYNEUROPATHY, WITHOUT LONG-TERM CURRENT USE OF INSULIN (HCC): Primary | ICD-10-CM

## 2024-01-09 DIAGNOSIS — E11.65 TYPE 2 DIABETES MELLITUS WITH HYPERGLYCEMIA, WITHOUT LONG-TERM CURRENT USE OF INSULIN (HCC): ICD-10-CM

## 2024-01-09 NOTE — RESULT ENCOUNTER NOTE
DERMATOPATHOLOGY RESULT NOTE    Results reviewed by ordering physician.  RESIDENTS: Please call patient and review results. Offer mohs      Result & Plan by Specimen:    Specimen A: malignant  Plan: MOHs

## 2024-01-09 NOTE — PROGRESS NOTES
Name: Darryl Ibanez      : 1970      MRN: [id number]  Encounter Provider: Paul Jordan MD  Encounter Date: 2024   Encounter department: [facility]    Assessment & Plan     1. Type 2 diabetes mellitus with diabetic polyneuropathy, without long-term current use of insulin (HCC)  Assessment & Plan:    Lab Results   Component Value Date    HGBA1C 10.5 (H) 2014   Pt appears to be overdue for labs. Pt has lost over 60lbs since his dx and tries to watch his diet.  Will refer pt for labs. Also due for eye exam.  Recheck 3m - earlier if A1C significantly elevated    Orders:  -     Comprehensive metabolic panel; Future  -     Hemoglobin A1C; Future  -     Lipid panel; Future  -     Albumin / creatinine urine ratio; Future; Expected date: 2024  -     TSH, 3rd generation; Future  -     CBC; Future; Expected date: 2024    2. Type 2 diabetes mellitus with hyperglycemia, without long-term current use of insulin (HCC)  Assessment & Plan:    Lab Results   Component Value Date    HGBA1C 10.5 (H) 2014   Pt appears to be overdue for labs. Pt has lost over 60lbs since his dx and tries to watch his diet.  Will refer pt for labs. Also due for eye exam.  Recheck 3m - earlier if A1C significantly elevated      3. Coronary artery disease involving native coronary artery of native heart without angina pectoris  Assessment & Plan:  Pt remains asymptomatic. Due for labs. Continue atorvastatin and ASA.  F.u with Cardio.  Recheck 3m      4. Essential hypertension  Assessment & Plan:  Well controlled. Cont present treatment. Monitor labs. Recheck 6m            5. Screen for colon cancer  -     Cologuard         Subjective     f/u multiple med issues and here to be established  -Patient diagnosed with diabetes type 2 in  after suffering from MI.  Patient was on metformin in the past but is not on any medications at present.  Patient is overdue for blood work.  He only  recently got a new blood sugar monitor but does not check it regularly.  Patient states that he was 280 pounds at the time of diagnosis, is now down to approximately 209 pounds.  Patient is due for an eye exam.  -Patient suffered a non-STEMI in 2014.  Cardiac cath at that time showed a 90% blockage of the LAD with that was not amenable to intervention but did have good collateral flow.  Patient also had 70% of the circumflex, and 50% of the right coronary artery.  Patient ended up having PTCA/stent done of the first obtuse marginal.  Patient is up-to-date with cardiology.  He denies any chest pain, palpitations, lightheadedness or other cardiovascular symptoms with or without exertion  - pt denies any new GI or  complaints. Pt due for CRC  - I reviewed PMHx, PSHx, Fam Hx and Soc Hx with pt.   - full ROS done        Review of Systems   Constitutional: Negative.    HENT: Negative.     Eyes: Negative.    Respiratory: Negative.     Cardiovascular: Negative.    Gastrointestinal: Negative.    Endocrine: Negative.    Genitourinary: Negative.    Musculoskeletal: Negative.    Skin: Negative.    Allergic/Immunologic: Negative.    Neurological: Negative.    Hematological: Negative.    Psychiatric/Behavioral: Negative.         Past Medical History:   Diagnosis Date   • Allergic 1977    Penicillin   • Basal cell carcinoma    • Chest pain    • Diabetes mellitus (HCC)    • History of cardiac cath    • Hypertension    • Lyme disease      Past Surgical History:   Procedure Laterality Date   • CARDIAC CATHETERIZATION Left    • NOSE SURGERY       Family History   Problem Relation Age of Onset   • Coronary artery disease Mother    • Thyroid disease unspecified Mother    • Coronary artery disease Father    • Stroke Father    • Cancer Sister    • Coronary artery disease Paternal Grandfather      Social History     Socioeconomic History   • Marital status: /Civil Union     Spouse name: Not on file   • Number of children: Not on  "file   • Years of education: Not on file   • Highest education level: Not on file   Occupational History   • Not on file   Tobacco Use   • Smoking status: Never   • Smokeless tobacco: Current     Types: Chew   Vaping Use   • Vaping status: Never Used   Substance and Sexual Activity   • Alcohol use: Yes     Alcohol/week: 4.0 standard drinks of alcohol     Types: 4 Cans of beer per week     Comment: social   • Drug use: No   • Sexual activity: Yes     Partners: Female     Birth control/protection: Post-menopausal   Other Topics Concern   • Not on file   Social History Narrative   • Not on file     Social Determinants of Health     Financial Resource Strain: Not on file   Food Insecurity: Not on file   Transportation Needs: Not on file   Physical Activity: Not on file   Stress: Not on file   Social Connections: Not on file   Intimate Partner Violence: Not on file   Housing Stability: Not on file     Current Outpatient Medications on File Prior to Visit   Medication Sig   • aspirin 81 MG tablet Take 1 tablet by mouth daily   • atorvastatin (LIPITOR) 40 mg tablet Take 1 tablet (40 mg total) by mouth daily   • Blood Glucose Monitoring Suppl (OneTouch Verio) w/Device KIT Once   • clopidogrel (PLAVIX) 75 mg tablet Take 1 tablet (75 mg total) by mouth daily   • glucose blood (OneTouch Verio) test strip Use as instructed 2.day   • lisinopril (ZESTRIL) 5 mg tablet Take 1 tablet (5 mg total) by mouth daily   • metoprolol succinate (TOPROL-XL) 25 mg 24 hr tablet Take 1 tablet (25 mg total) by mouth daily   • OneTouch Delica Lancets 33G MISC Check twice a day     Allergies   Allergen Reactions   • Penicillins      Immunization History   Administered Date(s) Administered   • COVID-19 MODERNA VACC 0.5 ML IM 04/01/2021, 04/29/2021       Objective     /70 (BP Location: Left arm, Patient Position: Sitting, Cuff Size: Adult)   Pulse 78   Temp 97.9 °F (36.6 °C)   Ht 6' 1.82\" (1.875 m)   Wt 95 kg (209 lb 6.4 oz)   SpO2 98%   " BMI 27.02 kg/m²     Physical Exam  Vitals reviewed.   Constitutional:       Appearance: He is well-developed.   HENT:      Head: Normocephalic and atraumatic.      Right Ear: Tympanic membrane, ear canal and external ear normal.      Left Ear: Tympanic membrane, ear canal and external ear normal.      Nose: Nose normal.      Mouth/Throat:      Mouth: Mucous membranes are moist.   Eyes:      General: No scleral icterus.     Conjunctiva/sclera: Conjunctivae normal.      Pupils: Pupils are equal, round, and reactive to light.   Neck:      Thyroid: No thyromegaly.      Vascular: No carotid bruit.   Cardiovascular:      Rate and Rhythm: Normal rate and regular rhythm.      Pulses: Normal pulses.      Heart sounds: Normal heart sounds. No murmur heard.  Pulmonary:      Effort: Pulmonary effort is normal.      Breath sounds: Normal breath sounds.   Abdominal:      General: Bowel sounds are normal. There is no distension.      Palpations: Abdomen is soft. There is no mass.      Tenderness: There is no abdominal tenderness.   Musculoskeletal:         General: No swelling, tenderness or deformity. Normal range of motion.      Cervical back: Normal range of motion and neck supple. No tenderness. No muscular tenderness.      Right lower leg: No edema.      Left lower leg: No edema.      Comments: Decreased ROM of L shoulder in all planes   Lymphadenopathy:      Cervical: No cervical adenopathy.   Skin:     General: Skin is warm and dry.      Capillary Refill: Capillary refill takes less than 2 seconds.   Neurological:      Mental Status: He is alert and oriented to person, place, and time.      Cranial Nerves: No cranial nerve deficit.      Sensory: No sensory deficit.      Motor: No weakness.      Gait: Gait normal.   Psychiatric:         Mood and Affect: Mood normal.         Behavior: Behavior normal.         Thought Content: Thought content normal.         Judgment: Judgment normal.      Comments: PHQ-2/9 Depression  Screening    Little interest or pleasure in doing things: 0 - not at all  Feeling down, depressed, or hopeless: 0 - not at all  PHQ-2 Score: 0  PHQ-2 Interpretation: Negative depression screen         Paul Jordan MD

## 2024-01-10 ENCOUNTER — TELEPHONE (OUTPATIENT)
Age: 54
End: 2024-01-10

## 2024-01-10 ENCOUNTER — TELEPHONE (OUTPATIENT)
Dept: ADMINISTRATIVE | Facility: OTHER | Age: 54
End: 2024-01-10

## 2024-01-10 NOTE — TELEPHONE ENCOUNTER
----- Message from Rossana Carter MA sent at 1/9/2024  3:24 PM EST -----  Regarding: care gap request  01/09/24 3:24 PM    Hello, our patient attached above has had Diabetic Eye Exam completed/performed. Please assist in updating the patient chart by making an External outreach to [facility] located in [facility]. The date of service is within the last year.    Thank you,  Rossana Carter PG [facility] ANTHONY

## 2024-01-10 NOTE — LETTER
Diabetic Eye Exam Form    Date Requested: 24  Patient: Darryl Ibanez  Patient : 1970   Referring Provider: Paul Jordan MD      DIABETIC Eye Exam Date _______________________________      Type of Exam MUST be documented for Diabetic Eye Exams. Please CHECK ONE.     Retinal Exam       Dilated Retinal Exam       OCT       Optomap-Iris Exam      Fundus Photography       Left Eye - Please check Retinopathy or No Retinopathy        Exam did show retinopathy    Exam did not show retinopathy       Right Eye - Please check Retinopathy or No Retinopathy       Exam did show retinopathy    Exam did not show retinopathy       Comments __________________________________________________________    Practice Providing Exam ______________________________________________    Exam Performed By (print name) _______________________________________      Provider Signature ___________________________________________________      These reports are needed for  compliance.  Please fax this completed form and a copy of the Diabetic Eye Exam report to our office located at [address] [place] [address] as soon as possible via Fax [phone] [place] Price: Phone [phone]  We thank you for your assistance in treating our mutual patient.

## 2024-01-11 DIAGNOSIS — D04.30 SQUAMOUS CELL CARCINOMA IN SITU (SCCIS) OF SKIN OF FACE: Primary | ICD-10-CM

## 2024-01-11 NOTE — ASSESSMENT & PLAN NOTE
Lab Results   Component Value Date    HGBA1C 10.5 (H) 04/06/2014   Pt appears to be overdue for labs. Pt has lost over 60lbs since his dx and tries to watch his diet.  Will refer pt for labs. Also due for eye exam.  Recheck 3m - earlier if A1C significantly elevated

## 2024-01-11 NOTE — ASSESSMENT & PLAN NOTE
Pt remains asymptomatic. Due for labs. Continue atorvastatin and ASA.  F.u with Cardio.  Recheck 3m

## 2024-01-11 NOTE — TELEPHONE ENCOUNTER
Upon review of the In Basket request and the patient's chart, initial outreach has been made via fax to facility. Please see Contacts section for details.     Thank you  Dee Kelley

## 2024-01-11 NOTE — TELEPHONE ENCOUNTER
Upon review of the In Basket request we have found as a result of outreach that patient did not have the requested item(s) completed.  2022 last visit. No Diabetic Eye Exam was done.    Any additional questions or concerns should be emailed to the Practice Liaisons via the appropriate education email address, please do not reply via In Basket.    Thank you  Dee Kelley

## 2024-01-15 ENCOUNTER — TELEPHONE (OUTPATIENT)
Dept: DERMATOLOGY | Facility: CLINIC | Age: 54
End: 2024-01-15

## 2024-01-15 NOTE — LETTER
Darryl Ibanez     1970    [address] [place] [address]    Dear Darryl Ibanez,    You are scheduled to have the MOHS procedure on February 13, 2024 at 7:30 am for hector daugherty with Dr.Nadia Martinez. Our office is located in [facility] our address is [address] [place] [address]. Once you arrive please check in with our front staff in [address] and they will escort you to the MOHS waiting room.  If you have someone bringing you to your appointment they may wait in the waiting room or accompany you in your visit.      Below you will find some pre-op instructions along with some information regarding the MOHS procedure.     If you have any questions please call our office at [phone].       Thank you,    [facility]         PRE-OPERATIVE INSTRUCTIONS - MOHS    Before your scheduled surgery, there are a number of important precautions and positive steps you should take to help prepare yourself for a successful treatment and speedy recovery.    Some of the steps, which are listed below, may seem unnecessary and inconvenient, but they are important. For example, when you stop smoking, you increase your ability to heal. Occasionally, there may be valid reasons, personal or medical, why you can't comply. In such cases, please call the office so we can discuss possible ways to overcome any obstacles you may be encountering.    If you have any questions about the surgery, or remember additional medical information that you forgot to mention to our staff, please contact the office prior to your surgery.    GENERAL INFORMATION REGARDING MOHS MICROGRAPHIC SURGERY    Mohs surgery is a specialized technique for the removal of skin cancer developed by Dr. Frederick Mohs over 50 years ago to improve the cure rates of skin cancer. Traditionally, skin cancers are treated by destructive methods (radiation, freezing, scraping, and burning) or  excision (cutting out the tissue with standards margins and sending it to an outside laboratory for testing). These methods all yield cure rates between 65%-94%. However, for cancers located in cosmetically sensitive areas, large tumors, or tumors unsuccessfully treated by other means, Mohs surgery offers a higher cure rate. In most cases, Mohs surgery provides you with a 99% cure rate for primary (previously untreated) basal cell cancer and a 95% cure rate for primary squamous cell cancer. In Mohs surgery, tissue is removed and processed in a way that we are able to check 100% of the margins, giving the highest cure rate for any method of treating skin cancers while providing maximal preservation of normal skin. This allows the surgeon to produce an optimal cosmetic result for the patient by maximizing the amount of tissue removed yielding as small a scar as possible    On the day of surgery, you will be given local anesthesia only (similar to what was given to you during your initial biopsy). You will remain awake. You will verify the location of the skin cancer prior to the onset of the surgery. Once the area is numb, the tissue containing the skin cancer will be removed, taking a small safety margin. This margin is usually smaller than what would be taken with a standard excision. Once the tissue is removed, it is marked and oriented. The first layer (“Stage I”) will be processed in our laboratory. The wound will be treated for bleeding and a bandage will be placed to keep you comfortable while you wait an approximate 45 minutes-1 hour (for the processing of the tissue) in your room. Your Mohs surgeon will examine the pathology in the lab, checking all the margins. If any tumor remains, you will need to take a second layer of skin (“Stage 2”). The area will be re-anesthetized and your Mohs surgeon will remove more skin only in the area where the tumor exists. This process will continue until all the skin cancer  is removed. Unfortunately, there is no method to predict how many layers or stages will be taken.    Once the tumor has been removed completely, we will discuss the best ways to close the defect. Most wounds may be closed with stitches. A larger wound may require a skin graft or a flap. In rare instances, especially for cancers around the eye or for larger cancers, we may work with another surgeon (oculoplastic, ENT, plastics) with special skills to assist with reconstruction.    Medications: Please take all your normal medications the morning of your surgery. If you are a diabetic, please bring your insulin or medications with you, as well as a snack to avoid having low blood sugar during your day with us.     Blood Thinners    VERY IMPORTANT: We do NOT stop or hold prescribed blood thinners (such as Coumadin/Warfarin, Plavix, Eliquis, Pradaxa, Brilinta, Apixaban, Xarelto, Lovonox, Rivaroxaban, or Aggrenox) before Mohs surgery. Additionally, If you take aspirin because you have had a stroke, heart attack, heart disease, other condition, or your physician has prescribed you to take it, please continue your aspirin.    Although there is a risk of increased bruising and bleeding, we are still able to safely perform surgery while continuing these medications. Please NEVER stop your prescribed blood thinner without the managing doctor's (the doctor that prescribed the medication) permission or knowledge. If you have any concerns about not holding your blood thinner, please address these with your Mohs surgeon.    Most people should stop all non-steroidal anti-inflammatory medications (Motrin, Naproxen, Advil, Midol, Aleve, etc.) for 7 days prior to your scheduled surgery and 2 days after (unless instructed otherwise after surgery).  You may take Tylenol for pain.     Antibiotics  If you usually require antibiotics prior to dental work, please let the office know at least 24 hours prior to your surgery. Medical  conditions that sometimes require preoperative antibiotics include artificial heart valves, heart murmurs, artificial joints, and related problems. We may give you a different medication than the dentist, so please contact us for the correct antibiotic.  If you were prescribed pre-operative antibiotics by our office, please take the medication 2 hours prior to your procedure.    Vitamins and Supplements  Avoid taking any supplements with Vitamin E, Fish Oil, Gingko, Ginseng, and Garlic for 2 weeks before and 2 days after your surgery. These thin your blood.    Alcohol: Avoid drinking alcohol for 2 days prior to your surgery, and for 2 days afterwards (it thins the blood and causes more bruising and swelling).    Smoking: Try to STOP or reduce smoking significantly the week before your surgery, and especially the week afterwards (it greatly improves how well you heal). Tobacco smoke deprives the blood of oxygen, which is urgently needed by the wound during the healing process.    Contact Lenses: Do not wear them on the day of the surgery. Instead, wear glasses and bring your case, in case we need to remove them.    Clothing: Do not wear your nicest clothing on your surgery day. We recommend wearing a button down shirt that will not disrupt your post-operative dressing when changing later that night.    Bathing: On the morning of your surgery, you may bathe or shower normally. If you get your hair done on a weekly basis, remember to get your hair washed the day before surgery.   - You will need to keep your surgical site dry for a minimum of 48 hours after surgery.    Makeup: If your surgery is on the face, please do not wear any makeup on the day of the surgery.    Jewelry: Please try to avoid wearing jewelry on the day of surgery.    Food: On the morning of surgery, have breakfast but limit your intake of caffeinated beverages. They are diuretic and may inconvenience you during surgery. If you are following up with  another surgeon the same day as your Mohs surgery, you must receive permission to eat breakfast from that surgeon.      What to bring with you on the day of your surgery:  Bring snacks - Since you could be at the office long, you may bring snacks and/or lunch with you. Some snacks and drinks are available at the office as well.   Bring a sweater - Bring a sweater or jacket that buttons or zips down the front and will not disturb your wound dressing during removal.  Bring something to do - You will be spending much of the day in our office. There will be 45-60 minute waiting periods  between layers/stages, and while there is a television with cable in every room, it is nice to have something to keep you occupied such as books, magazines, knitting, music, or work.     Planning Ahead:  Other Appointments - It is important to realize that no matter how small the skin cancer appears to be, looks can be deceiving. Since your surgery may last the entire day, you should not schedule any other appointments that day.  Special Occasions - Surgery often creates swelling and bruising. Also, the post-op dressing may be rather large and obvious. Keep this in mind as you arrange your social/work schedule. If an important event is already planned, please check with your referring physician or your Mohs surgeon to see if the surgery can be postponed.  Activity Limits after Surgery - If surgery was performed on your face, we recommend that you keep your activity level to a minimum for 2-3 days (the blood pressure elevation related to exercise can lead to bleeding). If you have stitches in an area that will be under tension or significant movement (neck, back, arms, legs), you will need to avoid heavy lifting (anything over 5 lbs) or exercise for at least 2 weeks and possibly longer. We also advise that you limit out of town travel for the first 7 days after surgery. You should also wait at least 7 days before going into a pool or the  ocean.  Housework - Since you will need to minimize activity after surgery, plan to do your groceries, laundry, gardening, and other heavy household chores prior to your surgery. Please make arrangements for assistance during the post-op period. If surgery is around your mouth area, you may need to eat soft foods, such as soup, milkshakes, or yogurt for 48 hours.    Purchasing bandage supplies: Prior to surgery, please purchase the following items to care for your surgical wound properly.  Cotton swabs (Q-tips)  Vaseline or Aquaphor  Telfa pads (or any non-stick dressing)  Paper tape or Hypafix tape  Gauze pads (3x3)      We will provide you with some bandage supplies after surgery to get you started.    Transportation: It is often reassuring and comforting to have a  drive you to and from the surgery. He or she is welcome to wait in the office during the surgery. Please note that for safety reasons, only the patient is allowed in the procedure room during surgery. Thank you for your cooperation.    Rescheduling: If you need to reschedule your surgery, please notify the office as soon as possible.

## 2024-01-15 NOTE — TELEPHONE ENCOUNTER
Pre- operative Mohs Telephone Scheduling Note    Do you have a pacemaker, defibrillator, spinal or brain stimulator? no    Do you take antibiotics before skin or dental procedures? no  If yes, will likely require pre-operative antibiotics. Ask  the patient why they take the antibiotics (usually because of joint replacement).    Do you have a history of a joint replacements within the past 2 years? no   If yes, will likely require pre-operative antibiotics. Ask if orthopaedic surgeon has prescribed pre-operative antibiotics to take before procedures/dental work?    Do you take any OTC medications that thin your blood (Aspirin, Aleve, Ibuprofen) or supplements that thin your blood (fish oil, garlic, vitamin E, Ginko Biloba)? yes: asa    Do you take any prescribed medications that thin your blood (Coumadin, Plavix, Xarelto, Eliquis or another prescribed blood thinner)? yes: plavix    Do you have an allergy to lidocaine or epinephrine? no    Do you have allergies to Iodine? no    Do you wear a lidocaine patch? no    Have you ever been diagnosed with HIV, AIDS, Hep B and Hep C? no    Do you use a cane, walker or wheelchair? no    Is the patient from a nursing home? no If yes, Is there any special accommodations that is needed for patient n/a    Do you smoke? no      If yes,  patient to try and stop 2 days before surgery and 7 days after the surgery. Minimizing smoking as much as possible during this time will improve healing and the cosmetic result after surgery.    Do you use supplemental oxygen? If so, how many liters and can you be off it for a short period of time? N/a    Date scheduled: February 13, 2024 @ 7:30 am with Dr. Martinez    Coordination of Care with other provider (Oculoplastics, Plastics, ENT) required? no   IF YES, PLEASE FORWARD TO APPROPRIATE PERSONNEL TO HELP COORDINATE.    Are there remaining tumors to be scheduled? no    Was Prior Authorization obtained? No (please use [email or web address] to  document prior auth)

## 2024-01-19 ENCOUNTER — APPOINTMENT (OUTPATIENT)
Dept: LAB | Facility: HOSPITAL | Age: 54
End: 2024-01-19
Payer: COMMERCIAL

## 2024-01-19 DIAGNOSIS — E78.2 MIXED HYPERLIPIDEMIA: ICD-10-CM

## 2024-01-19 DIAGNOSIS — E55.9 VITAMIN D DEFICIENCY: ICD-10-CM

## 2024-01-19 DIAGNOSIS — E11.42 TYPE 2 DIABETES MELLITUS WITH DIABETIC POLYNEUROPATHY, WITHOUT LONG-TERM CURRENT USE OF INSULIN (HCC): ICD-10-CM

## 2024-01-19 DIAGNOSIS — E78.5 HYPERLIPIDEMIA, UNSPECIFIED HYPERLIPIDEMIA TYPE: ICD-10-CM

## 2024-01-19 DIAGNOSIS — I10 ESSENTIAL HYPERTENSION: ICD-10-CM

## 2024-01-19 DIAGNOSIS — E11.65 TYPE 2 DIABETES MELLITUS WITH HYPERGLYCEMIA, WITHOUT LONG-TERM CURRENT USE OF INSULIN (HCC): ICD-10-CM

## 2024-01-19 LAB
25(OH)D3 SERPL-MCNC: 13.8 NG/ML (ref 30–100)
ALBUMIN SERPL BCP-MCNC: 4.4 G/DL (ref 3.5–5)
ALP SERPL-CCNC: 75 U/L (ref 34–104)
ALT SERPL W P-5'-P-CCNC: 31 U/L (ref 7–52)
ANION GAP SERPL CALCULATED.3IONS-SCNC: 6 MMOL/L
AST SERPL W P-5'-P-CCNC: 16 U/L (ref 13–39)
BILIRUB SERPL-MCNC: 0.85 MG/DL (ref 0.2–1)
BUN SERPL-MCNC: 23 MG/DL (ref 5–25)
CALCIUM SERPL-MCNC: 9.4 MG/DL (ref 8.4–10.2)
CHLORIDE SERPL-SCNC: 103 MMOL/L (ref 96–108)
CHOLEST SERPL-MCNC: 124 MG/DL
CO2 SERPL-SCNC: 29 MMOL/L (ref 21–32)
CREAT SERPL-MCNC: 0.87 MG/DL (ref 0.6–1.3)
CREAT UR-MCNC: 202.9 MG/DL
EST. AVERAGE GLUCOSE BLD GHB EST-MCNC: 243 MG/DL
GFR SERPL CREATININE-BSD FRML MDRD: 98 ML/MIN/1.73SQ M
GLUCOSE P FAST SERPL-MCNC: 288 MG/DL (ref 65–99)
HBA1C MFR BLD: 10.1 %
HDLC SERPL-MCNC: 47 MG/DL
LDLC SERPL CALC-MCNC: 46 MG/DL (ref 0–100)
MICROALBUMIN UR-MCNC: 18.7 MG/L
MICROALBUMIN/CREAT 24H UR: 9 MG/G CREATININE (ref 0–30)
POTASSIUM SERPL-SCNC: 4.4 MMOL/L (ref 3.5–5.3)
PROT SERPL-MCNC: 7.1 G/DL (ref 6.4–8.4)
SODIUM SERPL-SCNC: 138 MMOL/L (ref 135–147)
TRIGL SERPL-MCNC: 157 MG/DL
TSH SERPL DL<=0.05 MIU/L-ACNC: 2.35 UIU/ML (ref 0.45–4.5)

## 2024-01-19 PROCEDURE — 82306 VITAMIN D 25 HYDROXY: CPT

## 2024-01-19 PROCEDURE — 82570 ASSAY OF URINE CREATININE: CPT

## 2024-01-19 PROCEDURE — 83036 HEMOGLOBIN GLYCOSYLATED A1C: CPT

## 2024-01-19 PROCEDURE — 80061 LIPID PANEL: CPT

## 2024-01-19 PROCEDURE — 82043 UR ALBUMIN QUANTITATIVE: CPT

## 2024-01-19 PROCEDURE — 84443 ASSAY THYROID STIM HORMONE: CPT

## 2024-01-22 ENCOUNTER — APPOINTMENT (OUTPATIENT)
Dept: RADIOLOGY | Facility: MEDICAL CENTER | Age: 54
End: 2024-01-22
Payer: COMMERCIAL

## 2024-01-22 ENCOUNTER — OFFICE VISIT (OUTPATIENT)
Dept: OBGYN CLINIC | Facility: MEDICAL CENTER | Age: 54
End: 2024-01-22
Payer: COMMERCIAL

## 2024-01-22 VITALS
SYSTOLIC BLOOD PRESSURE: 117 MMHG | WEIGHT: 208.4 LBS | HEIGHT: 73 IN | DIASTOLIC BLOOD PRESSURE: 84 MMHG | HEART RATE: 73 BPM | BODY MASS INDEX: 27.62 KG/M2

## 2024-01-22 DIAGNOSIS — M75.02 ADHESIVE CAPSULITIS OF LEFT SHOULDER: Primary | ICD-10-CM

## 2024-01-22 DIAGNOSIS — M25.512 LEFT SHOULDER PAIN, UNSPECIFIED CHRONICITY: ICD-10-CM

## 2024-01-22 PROCEDURE — 20610 DRAIN/INJ JOINT/BURSA W/O US: CPT | Performed by: ORTHOPAEDIC SURGERY

## 2024-01-22 PROCEDURE — 73030 X-RAY EXAM OF SHOULDER: CPT

## 2024-01-22 PROCEDURE — 99244 OFF/OP CNSLTJ NEW/EST MOD 40: CPT | Performed by: ORTHOPAEDIC SURGERY

## 2024-01-22 RX ORDER — TRIAMCINOLONE ACETONIDE 40 MG/ML
40 INJECTION, SUSPENSION INTRA-ARTICULAR; INTRAMUSCULAR
Status: COMPLETED | OUTPATIENT
Start: 2024-01-22 | End: 2024-01-22

## 2024-01-22 RX ADMIN — TRIAMCINOLONE ACETONIDE 40 MG: 40 INJECTION, SUSPENSION INTRA-ARTICULAR; INTRAMUSCULAR at 08:15

## 2024-01-22 NOTE — LETTER
January 22, 2024     Paul Jordan MD  [address]  [address]  [facility] [address]    Patient: Darryl Ibanez   YOB: 1970   Date of Visit: 1/22/2024       Dear Dr. Jordan:    Thank you for referring Darryl Ibanez to me for evaluation. Below are my notes for this consultation.    If you have questions, please do not hesitate to call me. I look forward to following your patient along with you.         Sincerely,        Radha Lane, DO        CC: No Recipients    Rosalio Garcias MD  1/22/2024  9:39 AM  Attested  [facility] [place] [address] [address] [facility] [address]       Darryl Ibanez  [id number]  1970    ORTHOPAEDIC SURGERY OUTPATIENT NOTE  1/22/2024      HISTORY:  53 y.o. male  left shoulder pain x 4 months. Started atraumatically. Located lateral left shoulder worse with movement and better with rest. He has a history of diabetes and adhesive caspulitis right shoulder years ago that lasted 6 months and improved with shot and PT. His current left shoulder pain has not improved and is associated with progressive stiffness. He denies numbness tingling. Pain usually 1/10 but occasionally goes up to 7/10 with extremes of motion. He is RHD works in sales.       Past Medical History:   Diagnosis Date   • Allergic 1977    Penicillin   • Basal cell carcinoma    • Chest pain    • Diabetes mellitus (HCC)    • History of cardiac cath    • Hypertension    • Lyme disease        Past Surgical History:   Procedure Laterality Date   • CARDIAC CATHETERIZATION Left    • NOSE SURGERY         Social History     Socioeconomic History   • Marital status: /Civil Union     Spouse name: Not on file   • Number of children: Not on file   • Years of education: Not on file   • Highest education level: Not on file   Occupational History   • Not on file   Tobacco Use   • Smoking status: Never   • Smokeless tobacco: Current     Types: Chew  "  Vaping Use   • Vaping status: Never Used   Substance and Sexual Activity   • Alcohol use: Yes     Alcohol/week: 4.0 standard drinks of alcohol     Types: 4 Cans of beer per week     Comment: social   • Drug use: No   • Sexual activity: Yes     Partners: Female     Birth control/protection: Post-menopausal   Other Topics Concern   • Not on file   Social History Narrative   • Not on file     Social Determinants of Health     Financial Resource Strain: Not on file   Food Insecurity: Not on file   Transportation Needs: Not on file   Physical Activity: Not on file   Stress: Not on file   Social Connections: Not on file   Intimate Partner Violence: Not on file   Housing Stability: Not on file       Family History   Problem Relation Age of Onset   • Coronary artery disease Mother    • Thyroid disease unspecified Mother    • Coronary artery disease Father    • Stroke Father    • Cancer Sister    • Coronary artery disease Paternal Grandfather         Patient's Medications   New Prescriptions    No medications on file   Previous Medications    ASPIRIN 81 MG TABLET    Take 1 tablet by mouth daily    ATORVASTATIN (LIPITOR) 40 MG TABLET    Take 1 tablet (40 mg total) by mouth daily    BLOOD GLUCOSE MONITORING SUPPL (ONETOUCH VERIO) W/DEVICE KIT    Once    CLOPIDOGREL (PLAVIX) 75 MG TABLET    Take 1 tablet (75 mg total) by mouth daily    GLUCOSE BLOOD (ONETOUCH VERIO) TEST STRIP    Use as instructed 2.day    LISINOPRIL (ZESTRIL) 5 MG TABLET    Take 1 tablet (5 mg total) by mouth daily    METOPROLOL SUCCINATE (TOPROL-XL) 25 MG 24 HR TABLET    Take 1 tablet (25 mg total) by mouth daily    ONETOUCH DELOdeo LANCETS 33G MISC    Check twice a day   Modified Medications    No medications on file   Discontinued Medications    No medications on file       Allergies   Allergen Reactions   • Penicillins         /84 (BP Location: Left arm, Patient Position: Sitting, Cuff Size: Standard)   Pulse 73   Ht 6' 1\" (1.854 m)   Wt 94.5 kg " "(208 lb 6.4 oz)   BMI 27.50 kg/m²      REVIEW OF SYSTEMS:  Constitutional: Negative.    HEENT: Negative.    Respiratory: Negative.    Skin: Negative.    Neurological: Negative.    Psychiatric/Behavioral: Negative.  Musculoskeletal: Negative except for that mentioned in the HPI.    PHYSICAL EXAM:     /84 (BP Location: Left arm, Patient Position: Sitting, Cuff Size: Standard)   Pulse 73   Ht 6' 1\" (1.854 m)   Wt 94.5 kg (208 lb 6.4 oz)   BMI 27.50 kg/m²   Gen: No acute distress, resting comfortably in bed  HEENT: Eyes clear, moist mucus membranes, hearing intact  Respiratory: No audible wheezing or stridor  Cardiovascular: Well Perfused peripherally, 2+ distal pulse  Abdomen: nondistended, no peritoneal signs    LEFT SHOULDER:     NVI axillary/medial/radial/ulnar and sensory intact     Forward flexion:   90   Abduction:  80 degrees   External rotation at 90 degrees abduction:   0 degrees   Internal rotation at 90 degrees abduction:  45 degrees   External rotation at 0 degrees:   20 degrees   Internal rotation: greater troch     STRENGTH:  Forward flexion:  5/5   Abduction:  5/5   External rotation:  5/5   Internal rotation:  5/5        Tender to palpation ACJ (acromioclavicular joint): Negative   Tender to palpation LHB (long head of biceps): Negative  Mazariegos test: positive  Lift off: Negative  Belly press: Negative  Bear hug: Negative  External lag sign: Negative  Cross-body adduction: unable to test  Sulcus sign: Negative  Rodolfo's test: Negative  Drop arm test: negative     RIGHT SHOULDER:     NVI axillary/medial/radial/ulnar and sensory intact     Forward flexion:   180 degrees   Abduction:  180 degrees   External rotation at 90 degrees abduction:  90 degrees   Internal rotation at 90 degrees abduction:   90 degrees   External rotation at 0 degrees:  70 degrees   Internal rotation: T7      STRENGTH:  Forward flexion:  5/5   Abduction:  5/5   External rotation:  5/5   Internal rotation:  5/5     "   Capillary refill brisk   Full ROM of elbows bilaterally      Skin:  No ecchymosis/abrasion/erythema/warmth     Cervical spine:   No neck pain or tenderness    IMAGING:  XR left shoulder shows mild glenohumeral arthritis, no other fracture dislocation or abnormality noted    Large joint arthrocentesis: L glenohumeral  Universal Protocol:  Consent given by: patient  Supporting Documentation  Indications: joint swelling   Procedure Details  Location: shoulder - L glenohumeral  Needle size: 22 G  Ultrasound guidance: no  Approach: anterior  Medications administered: 40 mg triamcinolone acetonide 40 mg/mL    Patient tolerance: patient tolerated the procedure well with no immediate complications  Dressing:  Sterile dressing applied        ASSESSMENT AND PLAN: 53 y.o. male  left shoulder adhesive caspuliits. Plan to treat conservatively with shot and formal PT and this time. No formal restrictions. Follow up in about 8 weeks for reassessment of effectiveness of therapy.    Attestation signed by Radha Lane DO at 1/22/2024  9:39 AM:  Attending Attestation     I reviewed the care furnished by the Resident during the visit.  I was present in the office and personally saw and examined the patient   Intraarticular steroid injection given today and PT script. F/u in 12 weeks.     Radha Lane DO

## 2024-01-22 NOTE — RESULT ENCOUNTER NOTE
Please call the patient regarding labs - A1C 10.1-diabetes is poorly controlled , he was last seen in the office in June and looks like he is following up with his PCP

## 2024-01-22 NOTE — PROGRESS NOTES
[facility] [place]  [address]  [facility] [address]       Darryl Ibanez  [id number]  1970    ORTHOPAEDIC SURGERY OUTPATIENT NOTE  1/22/2024      HISTORY:  53 y.o. male  left shoulder pain x 4 months. Started atraumatically. Located lateral left shoulder worse with movement and better with rest. He has a history of diabetes and adhesive caspulitis right shoulder years ago that lasted 6 months and improved with shot and PT. His current left shoulder pain has not improved and is associated with progressive stiffness. He denies numbness tingling. Pain usually 1/10 but occasionally goes up to 7/10 with extremes of motion. He is RHD works in sales.       Past Medical History:   Diagnosis Date   • Allergic 1977    Penicillin   • Basal cell carcinoma    • Chest pain    • Diabetes mellitus (HCC)    • History of cardiac cath    • Hypertension    • Lyme disease        Past Surgical History:   Procedure Laterality Date   • CARDIAC CATHETERIZATION Left    • NOSE SURGERY         Social History     Socioeconomic History   • Marital status: /Civil Union     Spouse name: Not on file   • Number of children: Not on file   • Years of education: Not on file   • Highest education level: Not on file   Occupational History   • Not on file   Tobacco Use   • Smoking status: Never   • Smokeless tobacco: Current     Types: Chew   Vaping Use   • Vaping status: Never Used   Substance and Sexual Activity   • Alcohol use: Yes     Alcohol/week: 4.0 standard drinks of alcohol     Types: 4 Cans of beer per week     Comment: social   • Drug use: No   • Sexual activity: Yes     Partners: Female     Birth control/protection: Post-menopausal   Other Topics Concern   • Not on file   Social History Narrative   • Not on file     Social Determinants of Health     Financial Resource Strain: Not on file   Food Insecurity: Not on file   Transportation Needs: Not on file   Physical  "Activity: Not on file   Stress: Not on file   Social Connections: Not on file   Intimate Partner Violence: Not on file   Housing Stability: Not on file       Family History   Problem Relation Age of Onset   • Coronary artery disease Mother    • Thyroid disease unspecified Mother    • Coronary artery disease Father    • Stroke Father    • Cancer Sister    • Coronary artery disease Paternal Grandfather         Patient's Medications   New Prescriptions    No medications on file   Previous Medications    ASPIRIN 81 MG TABLET    Take 1 tablet by mouth daily    ATORVASTATIN (LIPITOR) 40 MG TABLET    Take 1 tablet (40 mg total) by mouth daily    BLOOD GLUCOSE MONITORING SUPPL (ONETOUCH VERIO) W/DEVICE KIT    Once    CLOPIDOGREL (PLAVIX) 75 MG TABLET    Take 1 tablet (75 mg total) by mouth daily    GLUCOSE BLOOD (ONETOUCH VERIO) TEST STRIP    Use as instructed 2.day    LISINOPRIL (ZESTRIL) 5 MG TABLET    Take 1 tablet (5 mg total) by mouth daily    METOPROLOL SUCCINATE (TOPROL-XL) 25 MG 24 HR TABLET    Take 1 tablet (25 mg total) by mouth daily    ONETOUCH DELAgilOne LANCETS 33G MISC    Check twice a day   Modified Medications    No medications on file   Discontinued Medications    No medications on file       Allergies   Allergen Reactions   • Penicillins         /84 (BP Location: Left arm, Patient Position: Sitting, Cuff Size: Standard)   Pulse 73   Ht 6' 1\" (1.854 m)   Wt 94.5 kg (208 lb 6.4 oz)   BMI 27.50 kg/m²      REVIEW OF SYSTEMS:  Constitutional: Negative.    HEENT: Negative.    Respiratory: Negative.    Skin: Negative.    Neurological: Negative.    Psychiatric/Behavioral: Negative.  Musculoskeletal: Negative except for that mentioned in the HPI.    PHYSICAL EXAM:     /84 (BP Location: Left arm, Patient Position: Sitting, Cuff Size: Standard)   Pulse 73   Ht 6' 1\" (1.854 m)   Wt 94.5 kg (208 lb 6.4 oz)   BMI 27.50 kg/m²   Gen: No acute distress, resting comfortably in bed  HEENT: Eyes clear, moist " mucus membranes, hearing intact  Respiratory: No audible wheezing or stridor  Cardiovascular: Well Perfused peripherally, 2+ distal pulse  Abdomen: nondistended, no peritoneal signs    LEFT SHOULDER:     NVI axillary/medial/radial/ulnar and sensory intact     Forward flexion:   90   Abduction:  80 degrees   External rotation at 90 degrees abduction:   0 degrees   Internal rotation at 90 degrees abduction:  45 degrees   External rotation at 0 degrees:   20 degrees   Internal rotation: greater troch     STRENGTH:  Forward flexion:  5/5   Abduction:  5/5   External rotation:  5/5   Internal rotation:  5/5        Tender to palpation ACJ (acromioclavicular joint): Negative   Tender to palpation LHB (long head of biceps): Negative  Mazariegos test: positive  Lift off: Negative  Belly press: Negative  Bear hug: Negative  External lag sign: Negative  Cross-body adduction: unable to test  Sulcus sign: Negative  Rodolfo's test: Negative  Drop arm test: negative     RIGHT SHOULDER:     NVI axillary/medial/radial/ulnar and sensory intact     Forward flexion:   180 degrees   Abduction:  180 degrees   External rotation at 90 degrees abduction:  90 degrees   Internal rotation at 90 degrees abduction:   90 degrees   External rotation at 0 degrees:  70 degrees   Internal rotation: T7      STRENGTH:  Forward flexion:  5/5   Abduction:  5/5   External rotation:  5/5   Internal rotation:  5/5       Capillary refill brisk   Full ROM of elbows bilaterally      Skin:  No ecchymosis/abrasion/erythema/warmth     Cervical spine:   No neck pain or tenderness    IMAGING:  XR left shoulder shows mild glenohumeral arthritis, no other fracture dislocation or abnormality noted    Large joint arthrocentesis: L glenohumeral  Universal Protocol:  Consent given by: patient  Supporting Documentation  Indications: joint swelling   Procedure Details  Location: shoulder - L glenohumeral  Needle size: 22 G  Ultrasound guidance: no  Approach:  anterior  Medications administered: 40 mg triamcinolone acetonide 40 mg/mL    Patient tolerance: patient tolerated the procedure well with no immediate complications  Dressing:  Sterile dressing applied        ASSESSMENT AND PLAN: 53 y.o. male  left shoulder adhesive caspuliits. Plan to treat conservatively with shot and formal PT and this time. No formal restrictions. Follow up in about 8 weeks for reassessment of effectiveness of therapy.

## 2024-01-24 ENCOUNTER — OFFICE VISIT (OUTPATIENT)
Dept: FAMILY MEDICINE CLINIC | Facility: CLINIC | Age: 54
End: 2024-01-24
Payer: COMMERCIAL

## 2024-01-24 VITALS
HEIGHT: 75 IN | SYSTOLIC BLOOD PRESSURE: 110 MMHG | BODY MASS INDEX: 25.59 KG/M2 | DIASTOLIC BLOOD PRESSURE: 74 MMHG | WEIGHT: 205.8 LBS | TEMPERATURE: 96.9 F | OXYGEN SATURATION: 98 % | HEART RATE: 86 BPM

## 2024-01-24 DIAGNOSIS — I10 ESSENTIAL HYPERTENSION: ICD-10-CM

## 2024-01-24 DIAGNOSIS — E11.9 TYPE 2 DIABETES MELLITUS WITHOUT COMPLICATION, WITH LONG-TERM CURRENT USE OF INSULIN (HCC): Primary | ICD-10-CM

## 2024-01-24 DIAGNOSIS — Z79.4 TYPE 2 DIABETES MELLITUS WITHOUT COMPLICATION, WITH LONG-TERM CURRENT USE OF INSULIN (HCC): Primary | ICD-10-CM

## 2024-01-24 PROBLEM — M25.512 PAIN IN JOINT OF LEFT SHOULDER: Status: ACTIVE | Noted: 2023-11-01

## 2024-01-24 PROCEDURE — 99214 OFFICE O/P EST MOD 30 MIN: CPT | Performed by: FAMILY MEDICINE

## 2024-01-24 RX ORDER — METFORMIN HYDROCHLORIDE 500 MG/1
1000 TABLET, EXTENDED RELEASE ORAL
Qty: 180 TABLET | Refills: 1 | Status: SHIPPED | OUTPATIENT
Start: 2024-01-24 | End: 2024-07-22

## 2024-01-24 RX ORDER — PEN NEEDLE, DIABETIC 32GX 5/32"
NEEDLE, DISPOSABLE MISCELLANEOUS
Qty: 200 EACH | Refills: 3 | Status: SHIPPED | OUTPATIENT
Start: 2024-01-24

## 2024-01-24 RX ORDER — INSULIN DEGLUDEC 200 U/ML
20 INJECTION, SOLUTION SUBCUTANEOUS DAILY
Qty: 9 ML | Refills: 1 | Status: SHIPPED | OUTPATIENT
Start: 2024-01-24

## 2024-01-28 PROBLEM — E11.9 TYPE 2 DIABETES MELLITUS WITHOUT COMPLICATION, WITH LONG-TERM CURRENT USE OF INSULIN (HCC): Status: ACTIVE | Noted: 2024-01-28

## 2024-01-28 PROBLEM — Z79.4 TYPE 2 DIABETES MELLITUS WITHOUT COMPLICATION, WITH LONG-TERM CURRENT USE OF INSULIN (HCC): Status: ACTIVE | Noted: 2024-01-28

## 2024-01-28 NOTE — ASSESSMENT & PLAN NOTE
Blood pressure with good control.  Continue present medications including lisinopril 5 mg a day.  Recheck 3

## 2024-01-28 NOTE — PROGRESS NOTES
Name: Darryl Ibanez      : 1970      MRN: [id number]  Encounter Provider: Paul Jordan MD  Encounter Date: 2024   Encounter department: [facility]    Assessment & Plan     1. Type 2 diabetes mellitus without complication, with long-term current use of insulin ([facility])  Assessment & Plan:    Lab Results   Component Value Date    HGBA1C 10.1 (H) 2024   I reviewed with patient.  Discussed recent lab results, as well as risk for diabetic complications with persistent sugars at this level.  Will start patient on metformin  mg, 2 tablets daily.  Also recommend Tresiba 20 units daily.  Patient will check blood sugars twice a day (fasting and 2 hours after lunch or dinner) and call with results.  Will refer patient back to endocrinology for second opinion regarding treatment.  Recheck 3 months    Orders:  -     metFORMIN (GLUCOPHAGE-XR) 500 mg 24 hr tablet; Take 2 tablets (1,000 mg total) by mouth daily with breakfast  -     insulin degludec (Tresiba FlexTouch) 200 units/mL CONCENTRATED U-200 injection pen; Inject 20 Units under the skin daily  -     Ambulatory Referral to Endocrinology; Future  -     Insulin Pen Needle (BD Pen Needle Kay U/F) 32G X 4 MM MISC; Use twice daily as directed with insulin pen    2. Essential hypertension  Assessment & Plan:  Blood pressure with good control.  Continue present medications including lisinopril 5 mg a day.  Recheck 3             Subjective     f/u multiple med issues  - 52 yo male with hx of DMII, not on treatment, noted to have A1C = 10.1 on recent labs. Pt notes some increased thirst and urination for a while. He denies cahnge in vision, or peripheral neuropathy symptoms. Pt states that he understands the recommended diabetic diet and does not need need referral to diabetic education. He had seen Endo in the past but did not follow up at the time due to change in insurance. He is willing to go back   - pt denies CP,  palpitations, lightheadedness or other CV symptoms with or without exertion  - no new GI or  complaints      Review of Systems   Constitutional: Negative.    HENT: Negative.     Eyes: Negative.    Respiratory: Negative.     Cardiovascular: Negative.    Gastrointestinal: Negative.    Endocrine: Positive for polydipsia (mild) and polyuria (mild).   Genitourinary: Negative.    Musculoskeletal: Negative.    Skin: Negative.    Allergic/Immunologic: Negative.    Neurological: Negative.    Hematological: Negative.    Psychiatric/Behavioral: Negative.         Past Medical History:   Diagnosis Date   • Allergic 1977    Penicillin   • Basal cell carcinoma    • Chest pain    • Diabetes mellitus (HCC)    • History of cardiac cath    • Hypertension    • Lyme disease      Past Surgical History:   Procedure Laterality Date   • CARDIAC CATHETERIZATION Left    • NOSE SURGERY       Family History   Problem Relation Age of Onset   • Coronary artery disease Mother    • Thyroid disease unspecified Mother    • Coronary artery disease Father    • Stroke Father    • Cancer Sister    • Coronary artery disease Paternal Grandfather      Social History     Socioeconomic History   • Marital status: /Civil Union     Spouse name: None   • Number of children: None   • Years of education: None   • Highest education level: None   Occupational History   • None   Tobacco Use   • Smoking status: Never   • Smokeless tobacco: Current     Types: Chew   Vaping Use   • Vaping status: Never Used   Substance and Sexual Activity   • Alcohol use: Yes     Alcohol/week: 4.0 standard drinks of alcohol     Types: 4 Cans of beer per week     Comment: social   • Drug use: No   • Sexual activity: Yes     Partners: Female     Birth control/protection: Post-menopausal   Other Topics Concern   • None   Social History Narrative   • None     Social Determinants of Health     Financial Resource Strain: Not on file   Food Insecurity: Not on file   Transportation  "Needs: Not on file   Physical Activity: Not on file   Stress: Not on file   Social Connections: Not on file   Intimate Partner Violence: Not on file   Housing Stability: Not on file     Current Outpatient Medications on File Prior to Visit   Medication Sig   • aspirin 81 MG tablet Take 1 tablet by mouth daily   • atorvastatin (LIPITOR) 40 mg tablet Take 1 tablet (40 mg total) by mouth daily   • Blood Glucose Monitoring Suppl (OneTouch Verio) w/Device KIT Once   • clopidogrel (PLAVIX) 75 mg tablet Take 1 tablet (75 mg total) by mouth daily   • glucose blood (OneTouch Verio) test strip Use as instructed 2.day   • lisinopril (ZESTRIL) 5 mg tablet Take 1 tablet (5 mg total) by mouth daily   • metoprolol succinate (TOPROL-XL) 25 mg 24 hr tablet Take 1 tablet (25 mg total) by mouth daily   • OneTouch Delica Lancets 33G MISC Check twice a day     Allergies   Allergen Reactions   • Penicillins      Immunization History   Administered Date(s) Administered   • COVID-19 MODERNA VACC 0.5 ML IM 04/01/2021, 04/29/2021       Objective     /74 (BP Location: Left arm, Patient Position: Sitting, Cuff Size: Large)   Pulse 86   Temp (!) 96.9 °F (36.1 °C)   Ht 6' 2.5\" (1.892 m)   Wt 93.4 kg (205 lb 12.8 oz)   SpO2 98%   BMI 26.07 kg/m²     Physical Exam  Vitals reviewed.   HENT:      Mouth/Throat:      Mouth: Mucous membranes are moist.   Eyes:      Extraocular Movements: Extraocular movements intact.      Conjunctiva/sclera: Conjunctivae normal.      Pupils: Pupils are equal, round, and reactive to light.   Cardiovascular:      Rate and Rhythm: Normal rate.      Pulses: Normal pulses.   Pulmonary:      Effort: Pulmonary effort is normal.      Breath sounds: No wheezing or rales.   Abdominal:      General: There is no distension.      Palpations: There is no mass.      Tenderness: There is no abdominal tenderness.   Musculoskeletal:      Cervical back: No tenderness.   Lymphadenopathy:      Cervical: No cervical adenopathy. "   Skin:     Capillary Refill: Capillary refill takes less than 2 seconds.   Neurological:      General: No focal deficit present.      Mental Status: He is alert and oriented to person, place, and time.       Paul Jordan MD

## 2024-01-28 NOTE — ASSESSMENT & PLAN NOTE
Lab Results   Component Value Date    HGBA1C 10.1 (H) 01/19/2024   I reviewed with patient.  Discussed recent lab results, as well as risk for diabetic complications with persistent sugars at this level.  Will start patient on metformin  mg, 2 tablets daily.  Also recommend Tresiba 20 units daily.  Patient will check blood sugars twice a day (fasting and 2 hours after lunch or dinner) and call with results.  Will refer patient back to endocrinology for second opinion regarding treatment.  Recheck 3 months

## 2024-02-02 ENCOUNTER — EVALUATION (OUTPATIENT)
Dept: PHYSICAL THERAPY | Facility: MEDICAL CENTER | Age: 54
End: 2024-02-02
Payer: COMMERCIAL

## 2024-02-02 DIAGNOSIS — M75.02 ADHESIVE CAPSULITIS OF LEFT SHOULDER: Primary | ICD-10-CM

## 2024-02-02 PROCEDURE — 97110 THERAPEUTIC EXERCISES: CPT

## 2024-02-02 PROCEDURE — 97161 PT EVAL LOW COMPLEX 20 MIN: CPT

## 2024-02-02 NOTE — PROGRESS NOTES
PT Evaluation     Today's date: 2024  Patient name: Darryl Ibanez  : 1970  MRN: [id number]  Referring provider: Rosalio Garcias MD  Dx:   Encounter Diagnosis     ICD-10-CM    1. Adhesive capsulitis of left shoulder  M75.02 Ambulatory Referral to Physical Therapy        Eval/Re-Eval POC Expires Auth #/ Referral # Total Visits Start Date Expiration Date Extension Info Visits Limitation      [facility] no auth          BOMN                                                 1 2 3 4 5 6           7 8 9 10 11 12           13 14 15 16 17 18           19 20 21 22 23 24           25 26 27 28 29 30               Start Time: 0730  Stop Time: 0814  Total time in clinic (min): 44 minutes    Assessment  Assessment details: Darryl Ibanez is a 53 y.o. male who presents to PT with a referral from Dr. Garcias for a medical diagnosis of Adhesive capsulitis of left shoulder  (primary encounter diagnosis). Patient presents to PT with limitations in ROM, strength, functional mobility, and postural stability secondary to pain, decreased muscular endurance, decreased neuromuscular control, and joint hypomobility. Patient demonstrated decreased global shoulder ROM and decreased BL shoulder ER and flexion. Patient noted some tenderness during palpation over biceps tendon and bicipital groove. Patient's key impairments include: decreased ROM, decreased strength, decreased endurance, pain with functional activities, and poor body mechanics. The limitations listed above affect patient's ability to reach overhead, reach behind back, reach, lift/carry, push, pull, shower, get dressed, and drive, perform recreational activities and ADLs and decrease patient's quality of life. Patient to benefit from skilled PT to address these limitations,  increase ROM, improve strength, reduce pain, improve activity tolerance, and improve quality of life   Impairments: abnormal muscle firing, abnormal muscle tone, abnormal or restricted ROM,  activity intolerance, impaired physical strength, lacks appropriate home exercise program, pain with function, poor posture  and poor body mechanics    Symptom irritability: lowUnderstanding of Dx/Px/POC: good   Prognosis: good    Goals  STG (2-4 Weeks)  Patient will have an increase in global shoulder strength to a 4/5 MMT to promote increased stability in 4 weeks.  Patient will have a decrease in pain at worst by 2 points on the NPRS to improve quality of life in 4 weeks.  Patient will be efficient and compliant with comprehensive HEP in 4 weeks.    LTG (4-8 Weeks)  Patient will have a FOTO of anticipated or greater by discharge  Patient will be able to reach to shoulder height without pain t help with getting items off shelves by discharge  Patient will be able to reach overhead without pain to help with showering and dressing by discharge    Plan  Patient would benefit from: skilled physical therapy  Planned modality interventions: TENS, thermotherapy: hydrocollator packs, cryotherapy, electrical stimulation/Russian stimulation, traction and unattended electrical stimulation  Planned therapy interventions: abdominal trunk stabilization, IASTM, joint mobilization, activity modification, kinesiology taping, ADL training, manual therapy, massage, [place] taping, balance, balance/weight bearing training, motor coordination training, behavior modification, muscle pump exercises, body mechanics training, nerve gliding, neuromuscular re-education, breathing training, patient education, postural training, coordination, self care, transfer training, therapeutic training, therapeutic exercise, therapeutic activities, stretching, strengthening, fine motor coordination training, flexibility, functional ROM exercises, gait training, graded activity, graded exercise, graded motor, home exercise program, IADL retraining and work reintegration  Frequency: 2x week  Duration in weeks: 12  Plan of Care beginning date:  2024  Plan of Care expiration date: 2024  Treatment plan discussed with: patient        Subjective Evaluation    History of Present Illness  Onset date: about 6 months ago.  Mechanism of injury: Patient reports to PT with a CC of L shoulder pain and stiffness that started around 6 months ago with insidious onset. Patient stated that he had a shot about 2 weeks a go that helped a lot. Patient currently works in sales. Patient rated their current pain 1/10, at its worst 10/10, and at its best 1/10 on the NPRS. Patient described the pain as sharp and stated that it lasts less than 15 minutes. Patient stated that rest and relaxation makes it better, and getting dressed, driving, lifting things in front of him, reaching out out forward and overhead makes it worse. Patient reported that the pain is worse depending on usage and denies waking them at night. Patient denies changes to  bowel and bladder, chest pain, night pain, or fever. Patient reports no prior treatment or surgeries for CC. Patient's goals of PT are to decrease pain, improve ROM, increase strength, return to recreational activities, and return to work           Recurrent probem    Quality of life: good    Patient Goals  Patient goals for therapy: decreased pain, increased motion, increased strength, return to sport/leisure activities and return to work  Patient goal: Get back to golfing  Pain  Current pain ratin  At best pain ratin  At worst pain rating: 10  Quality: sharp  Relieving factors: rest and relaxation  Aggravating factors: overhead activity and lifting  Progression: no change          Objective     Postural Observations  Seated posture: fair  Standing posture: fair      Palpation   Left   No palpable tenderness to the biceps, infraspinatus, latissimus, lower trapezius, middle deltoid, rhomboids, triceps and upper trapezius.   Tenderness of the anterior deltoid and biceps.     Right   No palpable tenderness to the anterior  deltoid, biceps, infraspinatus, latissimus, lower trapezius, middle deltoid, rhomboids, triceps and upper trapezius.     Tenderness     Left Shoulder   Tenderness in the biceps tendon (proximal) and bicipital groove.     Neurological Testing     Sensation     Shoulder   Left Shoulder   Intact: light touch    Right Shoulder   Intact: Light touch    Active Range of Motion   Left Shoulder   Flexion: 86 degrees   Extension: 50 degrees   Abduction: 84 degrees   External rotation BTH: C7   Internal rotation BTB: sacrum with pain    Passive Range of Motion   Left Shoulder   Flexion: 114 degrees with pain  Abduction: 92 degrees with pain    Strength/Myotome Testing     Left Shoulder     Planes of Motion   Flexion: 4 (pain)   Extension: 5   Abduction: 4   External rotation at 0°: 3+ (pain)   Internal rotation at 0°: 4-     Right Shoulder     Planes of Motion   Flexion: 4   Extension: 5   Abduction: 4   External rotation at 0°: 4-   Internal rotation at 0°: 4-     HEP Demonstrated and Performed:  Access Code: [email or web address]  URL: [email or web address]  Date: 02/02/2024  Prepared by: Tushar Sr    Exercises  - Seated Scapular Retraction  - 2 x daily - 7 x weekly - 2 sets - 10 reps  - Standing 'L' Stretch at Counter  - 2 x daily - 7 x weekly - 1 sets - 3 reps - 30s hold  - Supine Shoulder Flexion Extension AAROM with Dowel  - 2 x daily - 7 x weekly - 2 sets - 10 reps - 5s hold  - Supine Shoulder External Rotation in 45 Degrees Abduction AAROM with Dowel  - 2 x daily - 7 x weekly - 2 sets - 10 reps - 5s hold  - Supine Shoulder Flexion Overhead with Dowel  - 2 x daily - 7 x weekly - 2 sets - 10 reps - 5s hold  - Shoulder Scaption AAROM with Dowel  - 2 x daily - 7 x weekly - 2 sets - 10 reps           Precautions: standard precautions,   Past Medical History:   Diagnosis Date    Allergic 1977    Penicillin    Basal cell carcinoma     Chest pain     Diabetes mellitus (HCC)     History of cardiac cath     Hypertension      Lyme disease          PT 1:1 entire time  Manuals             PROM L shoulder             Jt mobes             STM/Massage Gun                          Neuro Re-Ed             Scapular Retractions             TB GH Row             TB GH Ext             Cane AAROM             Supine Cane AAROM                                       Ther Ex             Pulleys             Finger Ladder             Doorway Stretch             GH Counter Stretch             Lat Stretch                                                                              Ther Activity                                       Gait Training                                       Modalities

## 2024-02-05 ENCOUNTER — OFFICE VISIT (OUTPATIENT)
Dept: PHYSICAL THERAPY | Facility: MEDICAL CENTER | Age: 54
End: 2024-02-05
Payer: COMMERCIAL

## 2024-02-05 ENCOUNTER — TELEPHONE (OUTPATIENT)
Dept: DERMATOLOGY | Facility: CLINIC | Age: 54
End: 2024-02-05

## 2024-02-05 DIAGNOSIS — M75.02 ADHESIVE CAPSULITIS OF LEFT SHOULDER: Primary | ICD-10-CM

## 2024-02-05 PROCEDURE — 97140 MANUAL THERAPY 1/> REGIONS: CPT

## 2024-02-05 PROCEDURE — 97112 NEUROMUSCULAR REEDUCATION: CPT

## 2024-02-05 PROCEDURE — 97110 THERAPEUTIC EXERCISES: CPT

## 2024-02-05 NOTE — PROGRESS NOTES
Daily Note     Today's date: 2024  Patient name: Darryl Ibanez  : 1970  MRN: [id number]  Referring provider: Rosalio Garcias MD  Dx:   Encounter Diagnosis     ICD-10-CM    1. Adhesive capsulitis of left shoulder  M75.02         Eval/Re-Eval POC Expires Auth #/ Referral # Total Visits Start Date Expiration Date Extension Info Visits Limitation      [facility] no auth          BOMN                                                 1 2 3 4 5 6          7 8 9 10 11 12           13 14 15 16 17 18           19 20 21 22 23 24           25 26 27 28 29 30             Start Time: 0800  Stop Time: 0857  Total time in clinic (min): 57 minutes    Subjective: Patient stated that he is doing the same, nothing new over the weekend. He stated that pain isnt too bad but shoots down his arm sometimes.      Objective: See treatment diary below      Assessment: Tolerated treatment well. Patient demonstrated decreased global shoulder ROM with exercises. Patient noted some pain with exercises at end ranges. Patient unable to perform doorway stretch due to limited ER, modified to use less ER to help. Patient demonstrated fatigue post treatment, exhibited good technique with therapeutic exercises, and would benefit from continued PT      Plan: Continue per plan of care.      Precautions: standard precautions,   Past Medical History:   Diagnosis Date    Allergic     Penicillin    Basal cell carcinoma     Chest pain     Diabetes mellitus (HCC)     History of cardiac cath     Hypertension     Lyme disease          PT 1:1 entire time  Manuals 2/5            PROM L shoulder IB            Jt mobes IB, PA, Inf            STM/Massage Gun IB Rhomboids, UT, delts                         Neuro Re-Ed             Scapular Retractions 20x             Cervical Retractions 20x supine            TB GH Row 20x BTB            TB GH Ext 20x Black TB            TB GH IR/ER 15x ea RTB            Cane AAROM 15x ea scaption/flex/abd/ER        "     Supine Cane AAROM 15x ea ER, flex            Scapular Protractions 20x                         Ther Ex             Pulleys 6'            Finger Ladder 10x 5\" hold flex/abd            Doorway Stretch 30\" 3x less ER            GH Counter Stretch 30\" 3x            Lat Stretch             GH IR Stretch 4x 20\"            Cross Body Stretch 4x 20\"                                                   Ther Activity                                       Gait Training                                       Modalities                                            "

## 2024-02-09 ENCOUNTER — APPOINTMENT (OUTPATIENT)
Dept: PHYSICAL THERAPY | Facility: MEDICAL CENTER | Age: 54
End: 2024-02-09
Payer: COMMERCIAL

## 2024-02-12 ENCOUNTER — OFFICE VISIT (OUTPATIENT)
Dept: PHYSICAL THERAPY | Facility: MEDICAL CENTER | Age: 54
End: 2024-02-12
Payer: COMMERCIAL

## 2024-02-12 DIAGNOSIS — M75.02 ADHESIVE CAPSULITIS OF LEFT SHOULDER: Primary | ICD-10-CM

## 2024-02-12 PROCEDURE — 97140 MANUAL THERAPY 1/> REGIONS: CPT

## 2024-02-12 PROCEDURE — 97112 NEUROMUSCULAR REEDUCATION: CPT

## 2024-02-12 PROCEDURE — 97110 THERAPEUTIC EXERCISES: CPT

## 2024-02-12 NOTE — PROGRESS NOTES
Daily Note     Today's date: 2024  Patient name: Darryl Ibanez  : 1970  MRN: [id number]  Referring provider: Rosalio Garcias MD  Dx:   Encounter Diagnosis     ICD-10-CM    1. Adhesive capsulitis of left shoulder  M75.02         Eval/Re-Eval POC Expires Auth #/ Referral # Total Visits Start Date Expiration Date Extension Info Visits Limitation      [facility] no auth          BOMN                                                 1 2 3 4 5 6         7 8 9 10 11 12           13 14 15 16 17 18           19 20 21 22 23 24           25 26 27 28 29 30             Start Time: 0759  Stop Time: 0844  Total time in clinic (min): 45 minutes    Subjective: Patient stated that his shoulder is feeling about the same.      Objective: See treatment diary below      Assessment: Tolerated treatment well. Patient demonstrated decreased global shoulder ROM with exercises. Patient noted tightness and discomfort with finger ladders in abduction.  Patient demonstrated fatigue post treatment, exhibited good technique with therapeutic exercises, and would benefit from continued PT      Plan: Continue per plan of care.      Precautions: standard precautions,   Past Medical History:   Diagnosis Date    Allergic     Penicillin    Basal cell carcinoma     Chest pain     Diabetes mellitus (HCC)     History of cardiac cath     Hypertension     Lyme disease          PT 1:1 entire time  Manuals            PROM L shoulder IB IB           Jt mobes IB, PA, Inf            STM/Massage Gun IB Rhomboids, UT, delts                         Neuro Re-Ed             Scapular Retractions 20x  20x           Cervical Retractions 20x supine            TB GH Row 20x BTB 20x Black TB           TB GH Ext 20x Black TB 20x Black TB           TB GH IR/ER 15x ea RTB 15x ea RTB           Tricep Pushdowns  20x GTB           Biceps Curl  20x GTB           Cane AAROM 15x ea scaption/flex/abd/ER 15x ea scaption/abd           Supine Cane  "AAROM 15x ea ER, flex 15x ea ER/ flex           Scapular Protractions 20x                         Ther Ex             Pulleys 6' 5'           Finger Ladder 10x 5\" hold flex/abd 10x 5\" holdflex/abd           Doorway Stretch 30\" 3x less ER 30\" 3x high med, low           GH Counter Stretch 30\" 3x 30\" 3x           Lat Stretch             GH IR Stretch 4x 20\" 30\" 3x           Cross Body Stretch 4x 20\" 30\" 3x           Pec Minor Stretch  10x 10\" hold with ball at wall           Upper Trap Stretch  30\" 3x                        Ther Activity                                       Gait Training                                       Modalities                                            "

## 2024-02-12 NOTE — TELEPHONE ENCOUNTER
Left message for Darryl to call back and reschedule MOHS that is scheduled for tomorrow. Explained office is closed to to weather coming. Left mohs number.

## 2024-02-12 NOTE — TELEPHONE ENCOUNTER
Patient called back and left message on voicemail advising the appointment for tomorrow is cancelled. Did advise of next available appointment date and time and will send [employer] message with that information included to patient as well. Did ask for call back if that time and date do not work for him.    fair minus

## 2024-02-14 ENCOUNTER — APPOINTMENT (OUTPATIENT)
Dept: PHYSICAL THERAPY | Facility: MEDICAL CENTER | Age: 54
End: 2024-02-14
Payer: COMMERCIAL

## 2024-02-16 ENCOUNTER — APPOINTMENT (OUTPATIENT)
Dept: PHYSICAL THERAPY | Facility: MEDICAL CENTER | Age: 54
End: 2024-02-16
Payer: COMMERCIAL

## 2024-02-21 LAB — COLOGUARD RESULT REPORTABLE: NEGATIVE

## 2024-02-23 ENCOUNTER — OFFICE VISIT (OUTPATIENT)
Dept: PHYSICAL THERAPY | Facility: MEDICAL CENTER | Age: 54
End: 2024-02-23
Payer: COMMERCIAL

## 2024-02-23 DIAGNOSIS — M75.02 ADHESIVE CAPSULITIS OF LEFT SHOULDER: Primary | ICD-10-CM

## 2024-02-23 PROCEDURE — 97112 NEUROMUSCULAR REEDUCATION: CPT

## 2024-02-23 PROCEDURE — 97110 THERAPEUTIC EXERCISES: CPT

## 2024-02-23 PROCEDURE — 97140 MANUAL THERAPY 1/> REGIONS: CPT

## 2024-02-23 NOTE — PROGRESS NOTES
Daily Note     Today's date: 2024  Patient name: Darryl Ibanez  : 1970  MRN: [id number]  Referring provider: Rosalio Garcias MD  Dx:   Encounter Diagnosis     ICD-10-CM    1. Adhesive capsulitis of left shoulder  M75.02         Eval/Re-Eval POC Expires Auth #/ Referral # Total Visits Start Date Expiration Date Extension Info Visits Limitation      [facility] no auth          BOMN                                                 1 2 3 4 5 6        7 8 9 10 11 12           13 14 15 16 17 18           19 20 21 22 23 24           25 26 27 28 29 30             Start Time: 737  Stop Time: 831  Total time in clinic (min): 54 minutes    Subjective: Patient stated that his shoulder is feeling alright today.      Objective: See treatment diary below      Assessment: Tolerated treatment well. Patient able to increase resistance on exercises as tolerated. Patient continues to demonstrate deficits in global L shoulder ROM. Patient demonstrated fatigue post treatment, exhibited good technique with therapeutic exercises, and would benefit from continued PT      Plan: Continue per plan of care.      Precautions: standard precautions,   Past Medical History:   Diagnosis Date    Allergic     Penicillin    Basal cell carcinoma     Chest pain     Diabetes mellitus (HCC)     History of cardiac cath     Hypertension     Lyme disease          PT 1:1 entire time  Manuals           PROM L shoulder IB IB IB          Jt mobes IB, PA, Inf  IB, PA, Inf          STM/Massage Gun IB Rhomboids, UT, delts                         Neuro Re-Ed             Scapular Retractions 20x  20x 20x          Cervical Retractions 20x supine            TB GH Row 20x BTB 20x Black TB 20x Black TB          TB GH Ext 20x Black TB 20x Black TB 20x Black TB          TB GH IR/ER 15x ea RTB 15x ea RTB 15x ea RTB          Tricep Pushdowns  20x GTB 20x BTB          Biceps Curl  20x GTB 20x BTB          Cane AAROM 15x ea  "scaption/flex/abd/ER 15x ea scaption/abd 15x ea scaptpo, abduction, ER          Supine Cane AAROM 15x ea ER, flex 15x ea ER/ flex 15x ea flex/ER          Scapular Protractions 20x            Bodyblade   1' 2x          Reverse Shoulder Shrugs   20x          Banded Shoulder Mobes   20x lat          Ther Ex             Pulleys 6' 5' 5'          Finger Ladder 10x 5\" hold flex/abd 10x 5\" holdflex/abd 15x 5\" hold flex/abd          Doorway Stretch 30\" 3x less ER 30\" 3x high med, low 30\" 3x high, low          GH Counter Stretch 30\" 3x 30\" 3x 30\" 3x          Lat Stretch             GH IR Stretch 4x 20\" 30\" 3x 30\" 3x          Cross Body Stretch 4x 20\" 30\" 3x 30\" 3x          Pec Minor Stretch  10x 10\" hold with ball at wall 10x 10\" hold with ball at wall          Upper Trap Stretch  30\" 3x 30\" 3x                       Ther Activity                                       Gait Training                                       Modalities                                            "

## 2024-02-26 ENCOUNTER — OFFICE VISIT (OUTPATIENT)
Dept: PHYSICAL THERAPY | Facility: MEDICAL CENTER | Age: 54
End: 2024-02-26
Payer: COMMERCIAL

## 2024-02-26 DIAGNOSIS — M75.02 ADHESIVE CAPSULITIS OF LEFT SHOULDER: Primary | ICD-10-CM

## 2024-02-26 PROCEDURE — 97140 MANUAL THERAPY 1/> REGIONS: CPT

## 2024-02-26 PROCEDURE — 97112 NEUROMUSCULAR REEDUCATION: CPT

## 2024-02-26 PROCEDURE — 97110 THERAPEUTIC EXERCISES: CPT

## 2024-02-26 NOTE — PROGRESS NOTES
Daily Note     Today's date: 2024  Patient name: Darryl Ibanez  : 1970  MRN: [id number]  Referring provider: Rosalio Garcias MD  Dx:   Encounter Diagnosis     ICD-10-CM    1. Adhesive capsulitis of left shoulder  M75.02         Eval/Re-Eval POC Expires Auth #/ Referral # Total Visits Start Date Expiration Date Extension Info Visits Limitation      [facility] no auth          BOMN                                                 1 2 3 4 5 6       7 8 9 10 11 12           13 14 15 16 17 18           19 20 21 22 23 24           25 26 27 28 29 30             Start Time: 0800  Stop Time: 0843  Total time in clinic (min): 43 minutes    Subjective: Patient stated that his shoulder is feeling pretty good      Objective: See treatment diary below      Assessment: Tolerated treatment well. Patient continues to demonstrate deficit in global shoulder ROM secondary to jt hypomobility ad pain. Patient able to increase resistance as tolerated on exercises. Patient demonstrated fatigue post treatment, exhibited good technique with therapeutic exercises, and would benefit from continued PT      Plan: Continue per plan of care.      Precautions: standard precautions,   Past Medical History:   Diagnosis Date    Allergic     Penicillin    Basal cell carcinoma     Chest pain     Diabetes mellitus (HCC)     History of cardiac cath     Hypertension     Lyme disease          PT 1:1 entire time  Manuals          PROM L shoulder IB IB IB IB         Jt mobes IB, PA, Inf  IB, PA, Inf IB PA, Inf         STM/Massage Gun IB Rhomboids, UT, delts                         Neuro Re-Ed             Scapular Retractions 20x  20x 20x 20x         Cervical Retractions 20x supine            TB GH Row 20x BTB 20x Black TB 20x Black TB 20x Black TB         TB GH Ext 20x Black TB 20x Black TB 20x Black TB 20x Black TB         TB GH IR/ER 15x ea RTB 15x ea RTB 15x ea RTB 15x ea BTB         Tricep  "Pushdowns  20x GTB 20x BTB 20x Black TB         Biceps Curl  20x GTB 20x BTB 20x Black TB         Cane AAROM 15x ea scaption/flex/abd/ER 15x ea scaption/abd 15x ea scaption, abduction, ER 15x ea         Supine Cane AAROM 15x ea ER, flex 15x ea ER/ flex 15x ea flex/ER 15x ea flex/ER         Scapular Protractions 20x            Bodyblade   1' 2x          Reverse Shoulder Shrugs   20x          Banded Shoulder Mobes   20x lat          Ther Ex             Pulleys 6' 5' 5' 5'         Finger Ladder 10x 5\" hold flex/abd 10x 5\" holdflex/abd 15x 5\" hold flex/abd 15x 5\" hold flex/abd         Doorway Stretch 30\" 3x less ER 30\" 3x high med, low 30\" 3x high, low 30\" 3x mid, low         GH Counter Stretch 30\" 3x 30\" 3x 30\" 3x          Lat Stretch    30\" 3x         GH IR Stretch 4x 20\" 30\" 3x 30\" 3x 30\" 3x         Cross Body Stretch 4x 20\" 30\" 3x 30\" 3x 30\" 3x         Pec Minor Stretch  10x 10\" hold with ball at wall 10x 10\" hold with ball at wall 10x 10\" hold with ball at wall         Standing Back Extensions    20x with ball at wall         Upper Trap Stretch  30\" 3x 30\" 3x                       Ther Activity                                       Gait Training                                       Modalities                                            "

## 2024-02-29 ENCOUNTER — OFFICE VISIT (OUTPATIENT)
Dept: PHYSICAL THERAPY | Facility: MEDICAL CENTER | Age: 54
End: 2024-02-29
Payer: COMMERCIAL

## 2024-02-29 DIAGNOSIS — M75.02 ADHESIVE CAPSULITIS OF LEFT SHOULDER: Primary | ICD-10-CM

## 2024-02-29 PROCEDURE — 97112 NEUROMUSCULAR REEDUCATION: CPT

## 2024-02-29 PROCEDURE — 97140 MANUAL THERAPY 1/> REGIONS: CPT

## 2024-02-29 PROCEDURE — 97110 THERAPEUTIC EXERCISES: CPT

## 2024-02-29 NOTE — PROGRESS NOTES
Daily Note     Today's date: 2024  Patient name: Darryl Ibanez  : 1970  MRN: [id number]  Referring provider: Rosalio Garcias MD  Dx:   Encounter Diagnosis     ICD-10-CM    1. Adhesive capsulitis of left shoulder  M75.02         Eval/Re-Eval POC Expires Auth #/ Referral # Total Visits Start Date Expiration Date Extension Info Visits Limitation      [facility] no auth          BOMN                                                 1 2 3 4 5 6      7 8 9 10 11 12           13 14 15 16 17 18           19 20 21 22 23 24           25 26 27 28 29 30             Start Time: 745  Stop Time: 838  Total time in clinic (min): 53 minutes    Subjective: Patient stated that his shoulder is feeling about the same      Objective: See treatment diary below      Assessment: Tolerated treatment well. Patient with decreased global shoulder ROM. Patient with some discomfort with TB GH horizontal abduction due to decreased mobility. Patient educated on completing exercises at home, verbalized understanding. Patient demonstrated fatigue post treatment, exhibited good technique with therapeutic exercises, and would benefit from continued PT      Plan: Continue per plan of care.      Precautions: standard precautions,   Past Medical History:   Diagnosis Date    Allergic     Penicillin    Basal cell carcinoma     Chest pain     Diabetes mellitus (HCC)     History of cardiac cath     Hypertension     Lyme disease          PT 1:1 3840-6153  Manuals         PROM L shoulder IB IB IB IB IB        Jt mobes IB, PA, Inf  IB, PA, Inf IB PA, Inf IB PA, inf        STM/Massage Gun IB Rhomboids, UT, delts                         Neuro Re-Ed             Scapular Retractions 20x  20x 20x 20x 20x        Cervical Retractions 20x supine            TB GH Row 20x BTB 20x Black TB 20x Black TB 20x Black TB 20x Black TB        TB GH Ext 20x Black TB 20x Black TB 20x Black TB 20x Black TB 20x  "Black TB        TB GH IR/ER 15x ea RTB 15x ea RTB 15x ea RTB 15x ea BTB 15x ea BTB        TB GH Horizontal Abduction     15x RTB        Tricep Pushdowns  20x GTB 20x BTB 20x Black TB 20x Black TB        Biceps Curl  20x GTB 20x BTB 20x Black TB 20x Black TB        Cane AAROM 15x ea scaption/flex/abd/ER 15x ea scaption/abd 15x ea scaption, abduction, ER 15x ea 15x ea        Supine Cane AAROM 15x ea ER, flex 15x ea ER/ flex 15x ea flex/ER 15x ea flex/ER 15x ea flex/ER        Supine AROM     15x flex        Scapular Protractions 20x            Bodyblade   1' 2x          Reverse Shoulder Shrugs   20x  20x        Banded Shoulder Mobes   20x lat  20x lat        Ther Ex             Pulleys 6' 5' 5' 5' 5'        Finger Ladder 10x 5\" hold flex/abd 10x 5\" holdflex/abd 15x 5\" hold flex/abd 15x 5\" hold flex/abd 15x 5\" hold flex/abd        Doorway Stretch 30\" 3x less ER 30\" 3x high med, low 30\" 3x high, low 30\" 3x mid, low 30\" 3x mid, low        GH Counter Stretch 30\" 3x 30\" 3x 30\" 3x  30\" 3x        Lat Stretch    30\" 3x         GH IR Stretch 4x 20\" 30\" 3x 30\" 3x 30\" 3x 30\"3x        Cross Body Stretch 4x 20\" 30\" 3x 30\" 3x 30\" 3x 30\" 3x        Pec Minor Stretch  10x 10\" hold with ball at wall 10x 10\" hold with ball at wall 10x 10\" hold with ball at wall 10x 10\" hold with ball at wall        Standing Back Extensions    20x with ball at wall 20x with ball at wall        Upper Trap Stretch  30\" 3x 30\" 3x                       Ther Activity                                       Gait Training                                       Modalities                                            "

## 2024-03-01 ENCOUNTER — APPOINTMENT (OUTPATIENT)
Dept: PHYSICAL THERAPY | Facility: MEDICAL CENTER | Age: 54
End: 2024-03-01
Payer: COMMERCIAL

## 2024-03-04 ENCOUNTER — PROCEDURE VISIT (OUTPATIENT)
Dept: DERMATOLOGY | Facility: CLINIC | Age: 54
End: 2024-03-04
Payer: COMMERCIAL

## 2024-03-04 ENCOUNTER — APPOINTMENT (OUTPATIENT)
Dept: PHYSICAL THERAPY | Facility: MEDICAL CENTER | Age: 54
End: 2024-03-04
Payer: COMMERCIAL

## 2024-03-04 VITALS
WEIGHT: 206 LBS | SYSTOLIC BLOOD PRESSURE: 112 MMHG | DIASTOLIC BLOOD PRESSURE: 72 MMHG | HEART RATE: 75 BPM | TEMPERATURE: 97.7 F | HEIGHT: 75 IN | BODY MASS INDEX: 25.61 KG/M2 | OXYGEN SATURATION: 95 %

## 2024-03-04 DIAGNOSIS — D04.30 SQUAMOUS CELL CARCINOMA IN SITU (SCCIS) OF SKIN OF FACE: ICD-10-CM

## 2024-03-04 PROCEDURE — 17311 MOHS 1 STAGE H/N/HF/G: CPT | Performed by: DERMATOLOGY

## 2024-03-04 PROCEDURE — 12052 INTMD RPR FACE/MM 2.6-5.0 CM: CPT | Performed by: DERMATOLOGY

## 2024-03-04 PROCEDURE — 17312 MOHS ADDL STAGE: CPT | Performed by: DERMATOLOGY

## 2024-03-04 NOTE — PROGRESS NOTES
MOHS Procedure Note    Patient: Darryl Ibanez  : 1970  MRN: [id number]  Date: 2024    History of Present Illness: The patient is a 53 y.o. male who presents with complaints of Squamous Cell Carcinoma In Situ on the Left [place].    Past Medical History:   Diagnosis Date    Allergic     Penicillin    Basal cell carcinoma     Chest pain     Diabetes mellitus (HCC)     History of cardiac cath     Hypertension     Lyme disease     Squamous cell skin cancer 2024    Left [place]; MOHS 2024       Past Surgical History:   Procedure Laterality Date    CARDIAC CATHETERIZATION Left     MOHS SURGERY Left 2024    SCCIS Left [place]; Dr. Schmid    NOSE SURGERY           Current Outpatient Medications:     aspirin 81 MG tablet, Take 1 tablet by mouth daily, Disp: , Rfl:     atorvastatin (LIPITOR) 40 mg tablet, Take 1 tablet (40 mg total) by mouth daily, Disp: 90 tablet, Rfl: 2    clopidogrel (PLAVIX) 75 mg tablet, Take 1 tablet (75 mg total) by mouth daily, Disp: 90 tablet, Rfl: 2    insulin degludec (Tresiba FlexTouch) 200 units/mL CONCENTRATED U-200 injection pen, Inject 20 Units under the skin daily, Disp: 9 mL, Rfl: 1    Insulin Pen Needle (BD Pen Needle Kay U/F) 32G X 4 MM MISC, Use twice daily as directed with insulin pen, Disp: 200 each, Rfl: 3    lisinopril (ZESTRIL) 5 mg tablet, Take 1 tablet (5 mg total) by mouth daily, Disp: 90 tablet, Rfl: 2    metFORMIN (GLUCOPHAGE-XR) 500 mg 24 hr tablet, Take 2 tablets (1,000 mg total) by mouth daily with breakfast, Disp: 180 tablet, Rfl: 1    metoprolol succinate (TOPROL-XL) 25 mg 24 hr tablet, Take 1 tablet (25 mg total) by mouth daily, Disp: 90 tablet, Rfl: 2    OneTouch Delica Lancets 33G MISC, Check twice a day, Disp: 100 each, Rfl: 1    Blood Glucose Monitoring Suppl (OneTouch Verio) w/Device KIT, Once, Disp: 1 kit, Rfl: 0    glucose blood (OneTouch Verio) test strip, Use as instructed 2.day, Disp: 100 strip, Rfl: 1    Allergies   Allergen  Reactions    Penicillins        Physical Exam:   Vitals:    03/04/24 1300   BP: 112/72   Pulse: 75   Temp: 97.7 °F (36.5 °C)   SpO2: 95%     General: Awake, Alert, Oriented x 3, Mood and affect appropriate  Respiratory: Respirations even and unlabored  Cardiovascular: Peripheral pulses intact; no edema  Musculoskeletal Exam: N/A    Assessment: 1.5 x 0.8 cm pink atrophic patch in location of prior bx. Biopsy proven to be a Squamous Cell Carcinoma In Situ on the left temple.    Plan: MOHS    Time of H&P Completion:1256    MOHS Procedure Timeout      Flowsheet Row Most Recent Value   Timeout: 1304   Patient Identity Verified: Yes   Correct Site Verified: Yes   Correct Procedure Verified: Yes            MOHS Diagnosis/Indication/Location/ID      Flowsheet Row Most Recent Value   Pathology Type Squamous cell carcinoma   Anatomic Site left temple   Indications for MOHS tumor location   MOHS ID [id number]            MOHS Site/Accession/Pre-Post      Flowsheet Row Most Recent Value   Original Site Identified (as submitted by referring clinician) Photo, Note   Biopsy Accession/Specimen # (as submitted by referring clincian) [id number]   Pre-MOHS Size Length (cm) 1.5   Pre-MOHS Size Width (cm) 0.8   Post-MOHS Size-Length (cm) 2   Post MOHS Size-Width (cm) 1.2   Repair Type Intermediate layered closure   Suture Type Vicryl, Prolene   Prolene Suture Size 5   Vicryl Suture Size 5   Final repair length (cm): 3.5   Anesthetic Used 1% Lidocaine with epinephrine            MOHS Tumor Stage 1 Information      Flowsheet Row Most Recent Value   Tissue Sections (blocks) 2   Microscopic Exam Section 1: Within the epidermis is a full-thickness proliferation of atypical keratinocytes with mitoses consistent with squamous cell carcinoma in situ. The overlying stratum corneum demonstrates parakeratosis. No invasion is noted.   Microscopic Exam Section 2: Within the epidermis is a full-thickness proliferation of atypical keratinocytes with  mitoses consistent with squamous cell carcinoma in situ. The overlying stratum corneum demonstrates parakeratosis. No invasion is noted.   Tumor Clear After Stage I? No            MOHS Tumor Stage 2 Information      Flowsheet Row Most Recent Value   Tissue Sections (blocks) 2   Microscopic Exam Section 1: No tumor identified in section.   Microscopic Exam Section 2: No tumor identified in section.   Tumor Clear After Stage II? Yes                    Patient identified, procedure verified, site identified and verified. Time out completed. Surgical removal of the lesion discussed with the patient (risks and benefits, including possibility of scarring, infection, recurrence or potential for further treatment)  I have specifically identified the site with the patient. I have discussed the fact that the patient will have a scar after the procedure regardless of granulation or repair with sutures. I have discussed that the repair options can range from granulation in some cases to linear or curvilinear closures to larger flaps or grafts.  There are sometimes flaps or grafts used that require multiples stages of surgery and will not be completed today, rather be completed over a series of appointments. I have discussed that occasionally due to location, size or depth of the lesion I may recommend consultation with and transfer of care for further removal or the reconstruction to another provider such as ophthalmology surgery, plastic surgery, ENT surgery, or surgical oncology. There are cases in which other testing such as imaging with MRI or CT scan or testing of lymph nodes is recommended because of the nature/depth/location of tumor seen during the removal. There is a risk of injury to nerves causing temporary or permanent numbness or the inability to move muscles full such as the inability to lift eyebrows. Questions answered and verbal and written consent was obtained.    The tumor qualifies for Mohs based on AUC  criteria. Dr. Schmid served as the surgeon and pathologist during the procedure.    With the patient in the supine position and under adequate local anesthesia with 1% lidocaine with epinephrine 1:100,000, the defect was scrubbed with Chlorhexidine. Sterile drapes were placed from the sterile tray.  Because of the location of the surgical defect, an intermediate closure was judged to give the best possible cosmetic and functional result.  The edges of the defect were carefully debrided removing any dead or coagulated tissue.      Hemostasis was obtained by pinpoint electrocoagulation.  Careful planning of removal of redundant tissue at either end of the defect was drawn out so that the suture lines would fall in the optimal orientation with regard to the relaxed skin tension lines.  These were then removed with a #15 blade scalpel.  The wound was then approximated by a deep layer of buried vertical mattress sutures and the cutaneous margins were approximated and closed by superficial sutures as noted above.  Estimated blood loss was less than 5 mL.      The patient tolerated the procedure well.  The wound was dressed with petrolatum, a non-stick pad, and a compression dressing.     Heriberto Schmid MD served as the surgeon and pathologist during the procedure.    Postoperative care: Wound care discussed at length.  I urged the patient to call us if any problems or question should arise.     Complications: none  Post-op medications: none  Patient condition after procedure: stable  Discharge plans: Plan for return to Mohs for suture removal, as scheduled in 7 days.     SCCIS cleared with 2 stages of mohs and repaired with 3.5cm closure.  Well tolerated.  S/R in 1 week.  Scribe Attestation      I,:  Cleo West MA am acting as a scribe while in the presence of the attending physician.:       I,:  Heriberto Schmid MD personally performed the services described in this documentation    as scribed in my presence.:

## 2024-03-04 NOTE — PATIENT INSTRUCTIONS
"Mohs Microscopic Surgery After Care    WOUND CARE AFTER SURGERY:    Do NOT to remove the pressure bandage for 48 hours. Keep the area clean and dry while this bandage is on.    After removing the bandage for the first time, gently clean the area with soap and water. If the bandage is difficult to remove, getting the bandage wet in the shower will sometimes help soften the adhesive and allow it to be removed more easily.     You will now need to cleanse this area daily in the shower with gentle soap. There is no need to scrub the area. Apply plain Vaseline ointment (this is over the counter and not a prescription) to the site followed by a clean appropriately sized bandage to area.  Non stick dressing and paper tape (or Hypafix) are recommended for sensitive skin but a bandaid is fine if it covers the area well.    You will need to continue the above daily wound care until you return for suture removal in 7 days (generally 7 days for the face, 10-14 days off the face)      RESTRICTIONS:     For two DAYS:   - You will need to take it very easy as this time is highest risk for bleeding. Being a \"couch potato\" during these two days is generally recommended.   - For surgeries on the face/neck/scalp: Avoid leaning down to pick things up off the floor as this brings blood up to your head. Instead, squat down to pick things up.     For two WEEKS:   - No heavy lifting (anything greater than 10 pounds)   - You can start to do slow, gentle activities such as slow walking but nothing to increase your heart rate and blood pressure too much (such as cardiovascular exercise). It is important to take it easy as there is still a risk for bleeding and a high risk popping of stitches open during this time.     If we did surgery near the eyes (including the nose, forehead, front part of your scalp, cheeks): It is VERY common to get a large amount of swelling around your eyes (puffy eyes). Although less frequent, this can be enough to " swell your eyes shut and can also come along with bruising. This should not hurt and is very expected and normal. It is typically worst at ~ 3 days out from your surgery and dramatically better 1 week post-operatively.       MANAGING YOUR PAIN AFTER SURGERY     You can expect to have some pain after surgery. This is normal. The pain is typically worse the first two days after surgery, and quickly begins to get better.     The best strategy for controlling your pain after surgery is around the clock pain control. You can take over the counter Acetaminophen (Tylenol) for discomfort, if no contraindications.     If you are taking this at the maximum dose, you can alternate this with Motrin (ibuprofen or Advil) as well. Alternating these medications with each other allows you to maximize your pain control. In addition to Tylenol and Motrin, you can use heating pads or ice packs on your incisions to help reduce your pain.     How will I alternate your regular strength over-the-counter pain medication?  You will take a dose of pain medication every three hours.   Start by taking 650 mg of Tylenol (2 pills of 325 mg)   3 hours later take 600 mg of Motrin (3 pills of 200 mg)   3 hours after taking the Motrin take 650 mg of Tylenol   3 hours after that take 600 mg of Motrin.    See example - if your first dose of Tylenol is at 12:00 PM     12:00 PM  Tylenol 650 mg (2 pills of 325 mg)    3:00 PM  Motrin 600 mg (3 pills of 200 mg)    6:00 PM  Tylenol 650 mg (2 pills of 325 mg)    9:00 PM  Motrin 600 mg (3 pills of 200 mg)    Continue alternating every 3 hours      Important:   Do not take more than 4000mg of Tylenol or 3200mg of Motrin in a 24-hour period.     What if I still have pain?   If you have pain that is not controlled with the over-the-counter pain medications (Tylenol and Motrin or Advil), don't hesitate to call our staff using the number provided. We will help make sure you are managing your pain in the best way  possible, and if necessary, we can provide a prescription for additional pain medication.     CALL OUR OFFICE IMMEDIATELY FOR ANY SIGNS OF INFECTION:    This includes fever, chills, increased redness, warmth, tenderness, severe discomfort/pain, or pus or foul smell coming from the wound. If you are experiencing any of the above, please call [facility] directly at [phone].    IF BLEEDING IS NOTICED:    Place a clean cloth over the area and apply firm pressure for thirty minutes.  Check the wound ONLY after 30 minutes of direct pressure; do not cheat and sneak a peak, as that does not count.  If bleeding persists after 30 minutes of legitimate direct pressure, then try one more round of direct pressure to the area.  Should the bleeding become heavier or not stop after the second attempt, call [facility] directly at [phone]. Your call will get routed to the dermatology surgeon on call even after hours.

## 2024-03-08 ENCOUNTER — APPOINTMENT (OUTPATIENT)
Dept: PHYSICAL THERAPY | Facility: MEDICAL CENTER | Age: 54
End: 2024-03-08
Payer: COMMERCIAL

## 2024-03-11 ENCOUNTER — OFFICE VISIT (OUTPATIENT)
Dept: PHYSICAL THERAPY | Facility: MEDICAL CENTER | Age: 54
End: 2024-03-11
Payer: COMMERCIAL

## 2024-03-11 ENCOUNTER — OFFICE VISIT (OUTPATIENT)
Dept: DERMATOLOGY | Facility: CLINIC | Age: 54
End: 2024-03-11

## 2024-03-11 DIAGNOSIS — Z48.02 ENCOUNTER FOR REMOVAL OF SUTURES: Primary | ICD-10-CM

## 2024-03-11 DIAGNOSIS — M75.02 ADHESIVE CAPSULITIS OF LEFT SHOULDER: Primary | ICD-10-CM

## 2024-03-11 PROCEDURE — 99024 POSTOP FOLLOW-UP VISIT: CPT | Performed by: DERMATOLOGY

## 2024-03-11 PROCEDURE — 97110 THERAPEUTIC EXERCISES: CPT

## 2024-03-11 PROCEDURE — 97112 NEUROMUSCULAR REEDUCATION: CPT

## 2024-03-11 NOTE — PROGRESS NOTES
PT Re-Evaluation     Today's date: 3/11/2024  Patient name: Darryl Ibanez  : 1970  MRN: [id number]  Referring provider: Rosalio Garcias MD  Dx:   Encounter Diagnosis     ICD-10-CM    1. Adhesive capsulitis of left shoulder  M75.02         Eval/Re-Eval POC Expires Auth #/ Referral # Total Visits Start Date Expiration Date Extension Info Visits Limitation      [facility] no auth          [facility]                                                 1 2 3 4 5 6      7 8 9 10 11 12   3/11        13 14 15 16 17 18           19 20 21 22 23 24           25 26 27 28 29 30               Start Time: 0800  Stop Time: 0845  Total time in clinic (min): 45 minutes    Assessment  Assessment details: IE:  Darryl Ibanez is a 53 y.o. male who presents to PT with a referral from Dr. Garcias for a medical diagnosis of Adhesive capsulitis of left shoulder  (primary encounter diagnosis). Patient presents to PT with limitations in ROM, strength, functional mobility, and postural stability secondary to pain, decreased muscular endurance, decreased neuromuscular control, and joint hypomobility. Patient demonstrated decreased global shoulder ROM and decreased BL shoulder ER and flexion. Patient noted some tenderness during palpation over biceps tendon and bicipital groove. Patient's key impairments include: decreased ROM, decreased strength, decreased endurance, pain with functional activities, and poor body mechanics. The limitations listed above affect patient's ability to reach overhead, reach behind back, reach, lift/carry, push, pull, shower, get dressed, and drive, perform recreational activities and ADLs and decrease patient's quality of life. Patient to benefit from skilled PT to address these limitations,  increase ROM, improve strength, reduce pain, improve activity tolerance, and improve quality of life     3/11/2024  Patient has completed 7 PT sessions to this date.  The patient has made improvements in  shoulder ROM and strength, however still continues to have deficits in end ranges of all ROM. Deficits present in shoulder ER strength during MMT testing. Patient's deficits affect the patient's ability to reach overhead, shower, get dressed, reach overhead, and perform ADLs. Patient was educated about shoulder progress thus far and how continued ROM and strengthening will further improve remaining limitations. HEP progressed and updated to reflect current patient function. Patient was educated about safety with exercises and educated on performing correctly and safely.  All other questions and concerns were addressed.  I believe this patient will continue to benefit from skilled PT to address remaining deficits in shoulder ROM and strength and improve activity tolerance and quality of life.    Impairments: abnormal muscle firing, abnormal muscle tone, abnormal or restricted ROM, activity intolerance, impaired physical strength, lacks appropriate home exercise program, pain with function, poor posture  and poor body mechanics    Symptom irritability: lowUnderstanding of Dx/Px/POC: good   Prognosis: good    Goals  STG (2-4 Weeks)  Patient will have an increase in global shoulder strength to a 4/5 MMT to promote increased stability in 4 weeks. PROGRESSING  Patient will have a decrease in pain at worst by 2 points on the NPRS to improve quality of life in 4 weeks. MET  Patient will be efficient and compliant with comprehensive HEP in 4 weeks. MET    LTG (4-8 Weeks)  Patient will have a FOTO of anticipated or greater by discharge. MET  Patient will be able to reach to shoulder height without pain to help with getting items off shelves by discharge. MET  Patient will be able to reach overhead without pain to help with showering and dressing by discharge. PROGRESSING    Plan  Patient would benefit from: skilled physical therapy  Planned modality interventions: TENS, thermotherapy: hydrocollator packs, cryotherapy,  electrical stimulation/Russian stimulation, traction and unattended electrical stimulation  Planned therapy interventions: abdominal trunk stabilization, IASTM, joint mobilization, activity modification, kinesiology taping, ADL training, manual therapy, massage, [place] taping, balance, balance/weight bearing training, motor coordination training, behavior modification, muscle pump exercises, body mechanics training, nerve gliding, neuromuscular re-education, breathing training, patient education, postural training, coordination, self care, transfer training, therapeutic training, therapeutic exercise, therapeutic activities, stretching, strengthening, fine motor coordination training, flexibility, functional ROM exercises, gait training, graded activity, graded exercise, graded motor, home exercise program, IADL retraining and work reintegration  Frequency: 2x week  Duration in weeks: 12  Plan of Care beginning date: 2/2/2024  Plan of Care expiration date: 4/26/2024  Treatment plan discussed with: patient        Subjective Evaluation    History of Present Illness  Onset date: about 6 months ago.  Mechanism of injury: Patient reports to PT with a CC of L shoulder pain and stiffness that started around 6 months ago with insidious onset. Patient stated that he had a shot about 2 weeks a go that helped a lot. Patient currently works in sales. Patient rated their current pain 1/10, at its worst 10/10, and at its best 1/10 on the NPRS. Patient described the pain as sharp and stated that it lasts less than 15 minutes. Patient stated that rest and relaxation makes it better, and getting dressed, driving, lifting things in front of him, reaching out out forward and overhead makes it worse. Patient reported that the pain is worse depending on usage and denies waking them at night. Patient denies changes to  bowel and bladder, chest pain, night pain, or fever. Patient reports no prior treatment or surgeries for CC.  Patient's goals of PT are to decrease pain, improve ROM, increase strength, return to recreational activities, and return to work     3/11/2024  Patient stated that his shoulder feels good coming in. He stated that the pain has improved and its not constant like it was at the beginning but only if he over extends. Patient reports that he feels as though he is improving with motion, but still having trouble with external rotation.           Recurrent probem    Quality of life: good    Patient Goals  Patient goals for therapy: decreased pain, increased motion, increased strength, return to sport/leisure activities and return to work  Patient goal: Get back to golfing  Pain  Current pain ratin  At best pain ratin  At worst pain ratin  Quality: sharp  Relieving factors: rest and relaxation  Aggravating factors: overhead activity and lifting  Progression: no change          Objective     Postural Observations  Seated posture: fair  Standing posture: fair      Palpation   Left   No palpable tenderness to the anterior deltoid, biceps, infraspinatus, latissimus, lower trapezius, middle deltoid, rhomboids, triceps and upper trapezius.     Right   No palpable tenderness to the anterior deltoid, biceps, infraspinatus, latissimus, lower trapezius, middle deltoid, rhomboids, triceps and upper trapezius.     Neurological Testing     Sensation     Shoulder   Left Shoulder   Intact: light touch    Right Shoulder   Intact: Light touch    Active Range of Motion   Left Shoulder   Flexion: 128 degrees   Extension: 55 degrees   Abduction: 95 degrees   External rotation BTH: T1   Internal rotation BTB: L5     Passive Range of Motion   Left Shoulder   Flexion: 139 degrees   Abduction: 110 degrees     Strength/Myotome Testing     Left Shoulder     Planes of Motion   Flexion: 5   Extension: 5   Abduction: 5   External rotation at 0°: 4-   Internal rotation at 0°: 5     Right Shoulder     Planes of Motion   Flexion: 5  "  Extension: 5   Abduction: 5   External rotation at 0°: 5   Internal rotation at 0°: 5         Flowsheet Rows      Flowsheet Row Most Recent Value   PT/OT G-Codes    Current Score 75   Projected Score 68               Precautions: standard precautions,   Past Medical History:   Diagnosis Date    Allergic 1977    Penicillin    Basal cell carcinoma     Chest pain     Diabetes mellitus (HCC)     History of cardiac cath     Hypertension     Lyme disease     Squamous cell skin cancer 01/03/2024    Left [place]; MOHS 03/04/2024         PT 1:1 entire time  Manuals 2/5 2/12 2/23 2/26 2/29 3/11       PROM L shoulder IB IB IB IB IB        Jt mobes IB, PA, Inf  IB, PA, Inf IB PA, Inf IB PA, inf        STM/Massage Gun IB Rhomboids, UT, delts                         Neuro Re-Ed             Scapular Retractions 20x  20x 20x 20x 20x        Cervical Retractions 20x supine            TB GH Row 20x BTB 20x Black TB 20x Black TB 20x Black TB 20x Black TB 20x Black TB       TB GH Ext 20x Black TB 20x Black TB 20x Black TB 20x Black TB 20x Black TB 20x Black TB       TB GH IR/ER 15x ea RTB 15x ea RTB 15x ea RTB 15x ea BTB 15x ea BTB 15x ea RTB       TB GH Horizontal Abduction     15x RTB 20x RTB       Tricep Pushdowns  20x GTB 20x BTB 20x Black TB 20x Black TB 20x Black TB       Biceps Curl  20x GTB 20x BTB 20x Black TB 20x Black TB 20x Black TB       Cane AAROM 15x ea scaption/flex/abd/ER 15x ea scaption/abd 15x ea scaption, abduction, ER 15x ea 15x ea 15x ea       Supine Cane AAROM 15x ea ER, flex 15x ea ER/ flex 15x ea flex/ER 15x ea flex/ER 15x ea flex/ER        Supine AROM     15x flex        Scapular Protractions 20x            Bodyblade   1' 2x          Reverse Shoulder Shrugs   20x  20x        TB Y/T/I's      20x Y's RTB       Banded Shoulder Mobes   20x lat  20x lat        Ther Ex             Pulleys 6' 5' 5' 5' 5' 5'       Finger Ladder 10x 5\" hold flex/abd 10x 5\" holdflex/abd 15x 5\" hold flex/abd 15x 5\" hold flex/abd 15x 5\" " "hold flex/abd 15x flex/abd       Doorway Stretch 30\" 3x less ER 30\" 3x high med, low 30\" 3x high, low 30\" 3x mid, low 30\" 3x mid, low 30\" 3x mid, low       GH Counter Stretch 30\" 3x 30\" 3x 30\" 3x  30\" 3x        Lat Stretch    30\" 3x  30\" 3x       GH IR Stretch 4x 20\" 30\" 3x 30\" 3x 30\" 3x 30\"3x        Cross Body Stretch 4x 20\" 30\" 3x 30\" 3x 30\" 3x 30\" 3x        Pec Minor Stretch  10x 10\" hold with ball at wall 10x 10\" hold with ball at wall 10x 10\" hold with ball at wall 10x 10\" hold with ball at wall 10x 10\" hold with ball at wall       Standing Back Extensions    20x with ball at wall 20x with ball at wall 20x with ball at wall       Upper Trap Stretch  30\" 3x 30\" 3x                       Ther Activity             Reassessment of Function      10'                    Gait Training                                       Modalities                                         "

## 2024-03-11 NOTE — PROGRESS NOTES
"Suture removal    Date/Time: 3/11/2024 1:45 PM    Performed by: Hortensia Miller RN  Authorized by: Heriberto Schmid MD  Universal Protocol:  Consent: Verbal consent obtained. Written consent not obtained.  Risks and benefits: risks, benefits and alternatives were discussed  Consent given by: patient  Time out: Immediately prior to procedure a \"time out\" was called to verify the correct patient, procedure, equipment, support staff and site/side marked as required.  Timeout called at: 3/11/2024 1:35 PM.  Patient understanding: patient states understanding of the procedure being performed  Patient consent: the patient's understanding of the procedure matches consent given  Procedure consent: procedure consent matches procedure scheduled  Relevant documents: relevant documents present and verified  Test results: test results not available  Site marked: the operative site was not marked  Radiology Images displayed and confirmed. If images not available, report reviewed: imaging studies not available  Patient identity confirmed: verbally with patient      Patient location:  Clinic  Location:     Laterality:  Left    Location:  Head/neck    Head/neck location: temple.  Procedure details:     Tools used:  Suture removal kit    Wound appearance:  No sign(s) of infection, good wound healing, clean, moist, nonpurulent and pink    Number of sutures removed:  7  Post-procedure details:     Post-procedure assessment: vaseline ointment applied.    Patient tolerance of procedure:  Tolerated well, no immediate complications  Comments:      Patient was encouraged to continue to clean and care for the wound until fully healed. Patient was encouraged to continue to follow up for regular full body skin exams as scheduled.          Well healing scar, sutures removed.    Scribe Attestation      I,:  Hortensia Miller RN am acting as a scribe while in the presence of the attending physician.:       I,:  Heriberto Schmid MD personally performed " the services described in this documentation    as scribed in my presence.:

## 2024-03-15 ENCOUNTER — OFFICE VISIT (OUTPATIENT)
Dept: PHYSICAL THERAPY | Facility: MEDICAL CENTER | Age: 54
End: 2024-03-15
Payer: COMMERCIAL

## 2024-03-15 DIAGNOSIS — M75.02 ADHESIVE CAPSULITIS OF LEFT SHOULDER: Primary | ICD-10-CM

## 2024-03-15 PROCEDURE — 97110 THERAPEUTIC EXERCISES: CPT

## 2024-03-15 PROCEDURE — 97140 MANUAL THERAPY 1/> REGIONS: CPT

## 2024-03-15 PROCEDURE — 97112 NEUROMUSCULAR REEDUCATION: CPT

## 2024-03-15 NOTE — PROGRESS NOTES
Daily Note     Today's date: 3/15/2024  Patient name: Darryl Ibanez  : 1970  MRN: [id number]  Referring provider: Rosalio Garcias MD  Dx:   Encounter Diagnosis     ICD-10-CM    1. Adhesive capsulitis of left shoulder  M75.02         Eval/Re-Eval POC Expires Auth #/ Referral # Total Visits Start Date Expiration Date Extension Info Visits Limitation      [facility] no auth          BOMN                                                 1 2 3 4 5 6      7 8 9 10 11 12   3/11 3/15       13 14 15 16 17 18           19 20 21 22 23 24           25 26 27 28 29 30             Start Time: 739  Stop Time: 833  Total time in clinic (min): 54 minutes    Subjective: Patient stated that his shoulder is feeling good      Objective: See treatment diary below      Assessment: Tolerated treatment well. Patient able to complete all exercises without increased pain. Patient able to increase weight on supine flexion today without issue, Deficits still present in glpbal shoulder ROM. Patient demonstrated fatigue post treatment, exhibited good technique with therapeutic exercises, and would benefit from continued PT      Plan: Continue per plan of care.      Precautions: standard precautions,   Past Medical History:   Diagnosis Date    Allergic     Penicillin    Basal cell carcinoma     Chest pain     Diabetes mellitus (HCC)     History of cardiac cath     Hypertension     Lyme disease          PT 1:1 entire time  Manuals 2/5 2/12 2/23 2/26 2/29 3/11 3/15      PROM L shoulder IB IB IB IB IB  IB      Jt mobes IB, PA, Inf  IB, PA, Inf IB PA, Inf IB PA, inf  IB PA, Inf      STM/Massage Gun IB Rhomboids, UT, delts                         Neuro Re-Ed             Scapular Retractions 20x  20x 20x 20x 20x  20x      Cervical Retractions 20x supine            TB GH Row 20x BTB 20x Black TB 20x Black TB 20x Black TB 20x Black TB 20x Black TB 20x Black TB      TB GH Ext 20x Black TB 20x Black TB 20x Black TB  "20x Black TB 20x Black TB 20x Black TB 20x Black TB      TB GH IR/ER 15x ea RTB 15x ea RTB 15x ea RTB 15x ea BTB 15x ea BTB 15x ea RTB 15x BTB      TB GH Horizontal Abduction     15x RTB 20x RTB 20x RTB      Tricep Pushdowns  20x GTB 20x BTB 20x Black TB 20x Black TB 20x Black TB 20x Black TB      Biceps Curl  20x GTB 20x BTB 20x Black TB 20x Black TB 20x Black TB Black TB      Cane AAROM 15x ea scaption/flex/abd/ER 15x ea scaption/abd 15x ea scaption, abduction, ER 15x ea 15x ea 15x ea 15x ea      Supine Cane AAROM 15x ea ER, flex 15x ea ER/ flex 15x ea flex/ER 15x ea flex/ER 15x ea flex/ER  20x ea flex/ER      Supine AROM     15x flex  15x flex 3#      Scapular Protractions 20x      20x      Bodyblade   1' 2x          Reverse Shoulder Shrugs   20x  20x  20x      TB Y/T/I's      20x Y's RTB       Banded Shoulder Mobes   20x lat  20x lat  20x lat      Ther Ex             Pulleys 6' 5' 5' 5' 5' 5' 5'      Finger Ladder 10x 5\" hold flex/abd 10x 5\" holdflex/abd 15x 5\" hold flex/abd 15x 5\" hold flex/abd 15x 5\" hold flex/abd 15x flex/abd 15x abd      Doorway Stretch 30\" 3x less ER 30\" 3x high med, low 30\" 3x high, low 30\" 3x mid, low 30\" 3x mid, low 30\" 3x mid, low 30\" 3x mid, low      GH Counter Stretch 30\" 3x 30\" 3x 30\" 3x  30\" 3x        Lat Stretch    30\" 3x  30\" 3x 30\" 3x      GH IR Stretch 4x 20\" 30\" 3x 30\" 3x 30\" 3x 30\"3x        Cross Body Stretch 4x 20\" 30\" 3x 30\" 3x 30\" 3x 30\" 3x  30\" 3x      Pec Minor Stretch  10x 10\" hold with ball at wall 10x 10\" hold with ball at wall 10x 10\" hold with ball at wall 10x 10\" hold with ball at wall 10x 10\" hold with ball at wall 10x 10\" hold with ball at wall      Standing Back Extensions    20x with ball at wall 20x with ball at wall 20x with ball at wall 20x with ball at wall      Upper Trap Stretch  30\" 3x 30\" 3x    30\" 3x                   Ther Activity             Reassessment of Function      10'                    Gait Training                                     "   Modalities

## 2024-03-26 ENCOUNTER — OFFICE VISIT (OUTPATIENT)
Dept: PHYSICAL THERAPY | Facility: MEDICAL CENTER | Age: 54
End: 2024-03-26
Payer: COMMERCIAL

## 2024-03-26 DIAGNOSIS — M75.02 ADHESIVE CAPSULITIS OF LEFT SHOULDER: Primary | ICD-10-CM

## 2024-03-26 PROCEDURE — 97112 NEUROMUSCULAR REEDUCATION: CPT

## 2024-03-26 PROCEDURE — 97110 THERAPEUTIC EXERCISES: CPT

## 2024-03-26 NOTE — PROGRESS NOTES
Daily Note     Today's date: 3/26/2024  Patient name: Darryl Ibanez  : 1970  MRN: [id number]  Referring provider: Rosalio Garcias MD  Dx:   Encounter Diagnosis     ICD-10-CM    1. Adhesive capsulitis of left shoulder  M75.02         Eval/Re-Eval POC Expires Auth #/ Referral # Total Visits Start Date Expiration Date Extension Info Visits Limitation      [facility] no auth          BOMN                                                 1 2 3 4 5 6      7 8 9 10 11 12   3/11 3/15 3/26      13 14 15 16 17 18           19 20 21 22 23 24           25 26 27 28 29 30             Start Time: 15  Stop Time: 08  Total time in clinic (min): 45 minutes    Subjective: Patient stated that his shoulder is feeling pretty good. He stated that he feels as though it is getting better.      Objective: See treatment diary below      Assessment: Tolerated treatment well. Patient able to complete all exercises without increase in pain. Patient able to increase resistance on exercises as tolerated. Patient demonstrated fatigue post treatment, exhibited good technique with therapeutic exercises, and would benefit from continued PT      Plan: Continue per plan of care.      Precautions: standard precautions,   Past Medical History:   Diagnosis Date    Allergic     Penicillin    Basal cell carcinoma     Chest pain     Diabetes mellitus (HCC)     History of cardiac cath     Hypertension     Lyme disease          PT 1:1 entire time  Manuals 2/5 2/12 2/23 2/26 2/29 3/11 3/15 3/26     PROM L shoulder IB IB IB IB IB  IB      Jt mobes IB, PA, Inf  IB, PA, Inf IB PA, Inf IB PA, inf  IB PA, Inf      STM/Massage Gun IB Rhomboids, UT, delts                         Neuro Re-Ed             Scapular Retractions 20x  20x 20x 20x 20x  20x 20x     Cervical Retractions 20x supine            TB GH Row 20x BTB 20x Black TB 20x Black TB 20x Black TB 20x Black TB 20x Black TB 20x Black TB 20x Black TB     TB GH Ext 20x  "Black TB 20x Black TB 20x Black TB 20x Black TB 20x Black TB 20x Black TB 20x Black TB 20x Black TB     TB GH IR/ER 15x ea RTB 15x ea RTB 15x ea RTB 15x ea BTB 15x ea BTB 15x ea RTB 15x BTB 15x ea BTB     TB GH Horizontal Abduction     15x RTB 20x RTB 20x RTB 20x RTB     Tricep Pushdowns  20x GTB 20x BTB 20x Black TB 20x Black TB 20x Black TB 20x Black TB 20x Black TB     Biceps Curl  20x GTB 20x BTB 20x Black TB 20x Black TB 20x Black TB 20x Black TB 20x Black TB     Cane AAROM 15x ea scaption/flex/abd/ER 15x ea scaption/abd 15x ea scaption, abduction, ER 15x ea 15x ea 15x ea 15x ea 15x ea     Supine Cane AAROM 15x ea ER, flex 15x ea ER/ flex 15x ea flex/ER 15x ea flex/ER 15x ea flex/ER  20x ea flex/ER 20x ea flex/ER     Supine AROM     15x flex  15x flex 3# 2x10 flex 3#     Scapular Protractions 20x      20x 20x     Bodyblade   1' 2x          Reverse Shoulder Shrugs   20x  20x  20x      TB Y/T/I's      20x Y's RTB       Banded Shoulder Mobes   20x lat  20x lat  20x lat      Ther Ex             Pulleys 6' 5' 5' 5' 5' 5' 5' 5'     Finger Ladder 10x 5\" hold flex/abd 10x 5\" holdflex/abd 15x 5\" hold flex/abd 15x 5\" hold flex/abd 15x 5\" hold flex/abd 15x flex/abd 15x abd 15x flex/abd     Doorway Stretch 30\" 3x less ER 30\" 3x high med, low 30\" 3x high, low 30\" 3x mid, low 30\" 3x mid, low 30\" 3x mid, low 30\" 3x mid, low 30\" 3x mid, low     GH Counter Stretch 30\" 3x 30\" 3x 30\" 3x  30\" 3x        Lat Stretch    30\" 3x  30\" 3x 30\" 3x 30\" 3x     GH IR Stretch 4x 20\" 30\" 3x 30\" 3x 30\" 3x 30\"3x        Cross Body Stretch 4x 20\" 30\" 3x 30\" 3x 30\" 3x 30\" 3x  30\" 3x 30\" 3x     Pec Minor Stretch  10x 10\" hold with ball at wall 10x 10\" hold with ball at wall 10x 10\" hold with ball at wall 10x 10\" hold with ball at wall 10x 10\" hold with ball at wall 10x 10\" hold with ball at wall 10x 10\" hold with ball at wall     Standing Back Extensions    20x with ball at wall 20x with ball at wall 20x with ball at wall 20x with ball at wall 20x " "with ball at wall     Upper Trap Stretch  30\" 3x 30\" 3x    30\" 3x                   Ther Activity             Reassessment of Function      10'                    Gait Training                                       Modalities                                            "

## 2024-04-01 ENCOUNTER — OFFICE VISIT (OUTPATIENT)
Dept: PHYSICAL THERAPY | Facility: MEDICAL CENTER | Age: 54
End: 2024-04-01
Payer: COMMERCIAL

## 2024-04-01 DIAGNOSIS — M75.02 ADHESIVE CAPSULITIS OF LEFT SHOULDER: Primary | ICD-10-CM

## 2024-04-01 PROCEDURE — 97112 NEUROMUSCULAR REEDUCATION: CPT

## 2024-04-01 PROCEDURE — 97110 THERAPEUTIC EXERCISES: CPT

## 2024-04-01 NOTE — PROGRESS NOTES
Daily Note     Today's date: 2024  Patient name: Darryl Ibanez  : 1970  MRN: [id number]  Referring provider: Rosalio Garcias MD  Dx:   Encounter Diagnosis     ICD-10-CM    1. Adhesive capsulitis of left shoulder  M75.02         Eval/Re-Eval POC Expires Auth #/ Referral # Total Visits Start Date Expiration Date Extension Info Visits Limitation      [facility] no auth          BOMN                                                 1 2 3 4 5 6      7 8 9 10 11 12   3/11 3/15 3/26 4/1     13 14 15 16 17 18           19 20 21 22 23 24           25 26 27 28 29 30             Start Time: 0800  Stop Time: 0840  Total time in clinic (min): 40 minutes    Subjective: Patient stated that he used his shoulder a lot over the weekend doing overhead stuff and things on his roof.      Objective: See treatment diary below      Assessment: Tolerated treatment well. Patient challenged with ER AAROM in 90/90 position today. Patient given sleeper stretch today for IR, tolerated well. Patient able to complete all exercises without increase in pain. Patient demonstrated fatigue post treatment, exhibited good technique with therapeutic exercises, and would benefit from continued PT      Plan: Continue per plan of care.      Precautions: standard precautions,   Past Medical History:   Diagnosis Date    Allergic     Penicillin    Basal cell carcinoma     Chest pain     Diabetes mellitus (HCC)     History of cardiac cath     Hypertension     Lyme disease          PT 1:1 entire time  Manuals 2/5 2/12 2/23 2/26 2/29 3/11 3/15 3/26 4/1    PROM L shoulder IB IB IB IB IB  IB      Jt mobes IB, PA, Inf  IB, PA, Inf IB PA, Inf IB PA, inf  IB PA, Inf      STM/Massage Gun IB Rhomboids, UT, delts                         Neuro Re-Ed             Scapular Retractions 20x  20x 20x 20x 20x  20x 20x     Cervical Retractions 20x supine            TB GH Row 20x BTB 20x Black TB 20x Black TB 20x Black TB 20x Black TB 20x  "Black TB 20x Black TB 20x Black TB 20x Black TB    TB GH Ext 20x Black TB 20x Black TB 20x Black TB 20x Black TB 20x Black TB 20x Black TB 20x Black TB 20x Black TB 20x Black TB    TB GH IR/ER 15x ea RTB 15x ea RTB 15x ea RTB 15x ea BTB 15x ea BTB 15x ea RTB 15x BTB 15x ea BTB 15x ea BTB    TB GH Horizontal Abduction     15x RTB 20x RTB 20x RTB 20x RTB     Tricep Pushdowns  20x GTB 20x BTB 20x Black TB 20x Black TB 20x Black TB 20x Black TB 20x Black TB 20x Black TB    Biceps Curl  20x GTB 20x BTB 20x Black TB 20x Black TB 20x Black TB 20x Black TB 20x Black TB 20x Black TB    Cane AAROM 15x ea scaption/flex/abd/ER 15x ea scaption/abd 15x ea scaption, abduction, ER 15x ea 15x ea 15x ea 15x ea 15x ea 15x ea    Supine Cane AAROM 15x ea ER, flex 15x ea ER/ flex 15x ea flex/ER 15x ea flex/ER 15x ea flex/ER  20x ea flex/ER 20x ea flex/ER 20x ea flex/ER    Supine AROM     15x flex  15x flex 3# 2x10 flex 3# 20x flex 4#    Scapular Protractions 20x      20x 20x 20x 2#    Bodyblade   1' 2x          Reverse Shoulder Shrugs   20x  20x  20x      TB Y/T/I's      20x Y's RTB       Banded Shoulder Mobes   20x lat  20x lat  20x lat      Ther Ex             Pulleys 6' 5' 5' 5' 5' 5' 5' 5' 5'    Finger Ladder 10x 5\" hold flex/abd 10x 5\" holdflex/abd 15x 5\" hold flex/abd 15x 5\" hold flex/abd 15x 5\" hold flex/abd 15x flex/abd 15x abd 15x flex/abd 15x flex/abd    Doorway Stretch 30\" 3x less ER 30\" 3x high med, low 30\" 3x high, low 30\" 3x mid, low 30\" 3x mid, low 30\" 3x mid, low 30\" 3x mid, low 30\" 3x mid, low 30\" 3x mid, low    GH Counter Stretch 30\" 3x 30\" 3x 30\" 3x  30\" 3x        Lat Stretch    30\" 3x  30\" 3x 30\" 3x 30\" 3x 30\" 3x    GH IR Stretch 4x 20\" 30\" 3x 30\" 3x 30\" 3x 30\"3x        Cross Body Stretch 4x 20\" 30\" 3x 30\" 3x 30\" 3x 30\" 3x  30\" 3x 30\" 3x 30\" 3x    Pec Minor Stretch  10x 10\" hold with ball at wall 10x 10\" hold with ball at wall 10x 10\" hold with ball at wall 10x 10\" hold with ball at wall 10x 10\" hold with ball at wall " "10x 10\" hold with ball at wall 10x 10\" hold with ball at wall 10x 10\" hold with ball at wall    Standing Back Extensions    20x with ball at wall 20x with ball at wall 20x with ball at wall 20x with ball at wall 20x with ball at wall 20x with ball at wall    Upper Trap Stretch  30\" 3x 30\" 3x    30\" 3x      Sleeper Stretch         30\" 3x    Ther Activity             Reassessment of Function      10'                    Gait Training                                       Modalities                                            "

## 2024-04-05 ENCOUNTER — TELEPHONE (OUTPATIENT)
Dept: FAMILY MEDICINE CLINIC | Facility: CLINIC | Age: 54
End: 2024-04-05

## 2024-04-05 ENCOUNTER — OFFICE VISIT (OUTPATIENT)
Dept: PHYSICAL THERAPY | Facility: MEDICAL CENTER | Age: 54
End: 2024-04-05
Payer: COMMERCIAL

## 2024-04-05 DIAGNOSIS — M75.02 ADHESIVE CAPSULITIS OF LEFT SHOULDER: Primary | ICD-10-CM

## 2024-04-05 PROCEDURE — 97140 MANUAL THERAPY 1/> REGIONS: CPT

## 2024-04-05 PROCEDURE — 97112 NEUROMUSCULAR REEDUCATION: CPT

## 2024-04-05 PROCEDURE — 97110 THERAPEUTIC EXERCISES: CPT

## 2024-04-05 NOTE — PROGRESS NOTES
Called-needs yearly copy of FMLA forms to be filled out. Do have copy of file from 6/1/17 in [facility]. Mera will have  fax form and then will fax reply to HR on form.   Daily Note     Today's date: 2024  Patient name: Darryl Ibanez  : 1970  MRN: [id number]  Referring provider: Rosalio Garcias MD  Dx:   Encounter Diagnosis     ICD-10-CM    1. Adhesive capsulitis of left shoulder  M75.02         Eval/Re-Eval POC Expires Auth #/ Referral # Total Visits Start Date Expiration Date Extension Info Visits Limitation      [facility] no auth          BOMN                                                 1 2 3 4 5 6      7 8 9 10 11 12   3/11 3/15 3/26 4/1 4/5    13 14 15 16 17 18           19 20 21 22 23 24           25 26 27 28 29 30             Start Time: 07  Stop Time: 0815  Total time in clinic (min): 45 minutes    Subjective: Patient stated that his shoulder feels pretty good today.       Objective: See treatment diary below      Assessment: Tolerated treatment well. Patient completed doorway stretch with single arm instead bilaterally, noted improvements in stretching. Patient continues to have deficits in shoulder flexion, abduction, IR, and ER. Patient challenged with 90/90 ER today. Patient demonstrated fatigue post treatment, exhibited good technique with therapeutic exercises, and would benefit from continued PT      Plan: Continue per plan of care.      Precautions: standard precautions,   Past Medical History:   Diagnosis Date    Allergic     Penicillin    Basal cell carcinoma     Chest pain     Diabetes mellitus (HCC)     History of cardiac cath     Hypertension     Lyme disease          PT 1:1 entire time  Manuals 2/5 2/12 2/23 2/26 2/29 3/11 3/15 3/26 4/1 4/5   PROM L shoulder IB IB IB IB IB  IB   IB   Jt mobes IB, PA, Inf  IB, PA, Inf IB PA, Inf IB PA, inf  IB PA, Inf   IB PA, Inf   STM/Massage Gun IB Rhomboids, UT, delts                         Neuro Re-Ed             Scapular Retractions 20x  20x 20x 20x 20x  20x 20x     Cervical Retractions 20x supine            TB GH Row 20x BTB 20x Black TB 20x Black TB 20x Black TB 20x  "Black TB 20x Black TB 20x Black TB 20x Black TB 20x Black TB    TB GH Ext 20x Black TB 20x Black TB 20x Black TB 20x Black TB 20x Black TB 20x Black TB 20x Black TB 20x Black TB 20x Black TB    TB GH IR/ER 15x ea RTB 15x ea RTB 15x ea RTB 15x ea BTB 15x ea BTB 15x ea RTB 15x BTB 15x ea BTB 15x ea BTB 15x ea BTB   TB 90/90 ER          15x YTB   TB GH Horizontal Abduction     15x RTB 20x RTB 20x RTB 20x RTB     Tricep Pushdowns  20x GTB 20x BTB 20x Black TB 20x Black TB 20x Black TB 20x Black TB 20x Black TB 20x Black TB    Biceps Curl  20x GTB 20x BTB 20x Black TB 20x Black TB 20x Black TB 20x Black TB 20x Black TB 20x Black TB    Cane AAROM 15x ea scaption/flex/abd/ER 15x ea scaption/abd 15x ea scaption, abduction, ER 15x ea 15x ea 15x ea 15x ea 15x ea 15x ea 15x ea   Supine Cane AAROM 15x ea ER, flex 15x ea ER/ flex 15x ea flex/ER 15x ea flex/ER 15x ea flex/ER  20x ea flex/ER 20x ea flex/ER 20x ea flex/ER 20x ea flex/ER   Supine AROM     15x flex  15x flex 3# 2x10 flex 3# 20x flex 4# 20x flex 4#   Scapular Protractions 20x      20x 20x 20x 2#    Bodyblade   1' 2x          Reverse Shoulder Shrugs   20x  20x  20x      TB Y/T/I's      20x Y's RTB       Banded Shoulder Mobes   20x lat  20x lat  20x lat   20x 5\" hold AP with ER   Ther Ex             Pulleys 6' 5' 5' 5' 5' 5' 5' 5' 5' 5'   Finger Ladder 10x 5\" hold flex/abd 10x 5\" holdflex/abd 15x 5\" hold flex/abd 15x 5\" hold flex/abd 15x 5\" hold flex/abd 15x flex/abd 15x abd 15x flex/abd 15x flex/abd 10x flex/abd   Doorway Stretch 30\" 3x less ER 30\" 3x high med, low 30\" 3x high, low 30\" 3x mid, low 30\" 3x mid, low 30\" 3x mid, low 30\" 3x mid, low 30\" 3x mid, low 30\" 3x mid, low 30\" 3x mid, low   GH Counter Stretch 30\" 3x 30\" 3x 30\" 3x  30\" 3x        Lat Stretch    30\" 3x  30\" 3x 30\" 3x 30\" 3x 30\" 3x 30\" 3x   GH IR Stretch 4x 20\" 30\" 3x 30\" 3x 30\" 3x 30\"3x        Cross Body Stretch 4x 20\" 30\" 3x 30\" 3x 30\" 3x 30\" 3x  30\" 3x 30\" 3x 30\" 3x 30\" 3x   Pec Minor Stretch  10x " "10\" hold with ball at wall 10x 10\" hold with ball at wall 10x 10\" hold with ball at wall 10x 10\" hold with ball at wall 10x 10\" hold with ball at wall 10x 10\" hold with ball at wall 10x 10\" hold with ball at wall 10x 10\" hold with ball at wall    Standing Back Extensions    20x with ball at wall 20x with ball at wall 20x with ball at wall 20x with ball at wall 20x with ball at wall 20x with ball at wall    Upper Trap Stretch  30\" 3x 30\" 3x    30\" 3x      Sleeper Stretch         30\" 3x 30\" 3x   Ther Activity             Reassessment of Function      10'                    Gait Training                                       Modalities                                            "

## 2024-04-08 ENCOUNTER — OFFICE VISIT (OUTPATIENT)
Dept: PHYSICAL THERAPY | Facility: MEDICAL CENTER | Age: 54
End: 2024-04-08
Payer: COMMERCIAL

## 2024-04-08 DIAGNOSIS — M75.02 ADHESIVE CAPSULITIS OF LEFT SHOULDER: Primary | ICD-10-CM

## 2024-04-08 PROCEDURE — 97140 MANUAL THERAPY 1/> REGIONS: CPT

## 2024-04-08 PROCEDURE — 97110 THERAPEUTIC EXERCISES: CPT

## 2024-04-08 PROCEDURE — 97112 NEUROMUSCULAR REEDUCATION: CPT

## 2024-04-08 NOTE — PROGRESS NOTES
Daily Note     Today's date: 2024  Patient name: Darryl Ibanez  : 1970  MRN: [id number]  Referring provider: Rosalio Garcias MD  Dx:   Encounter Diagnosis     ICD-10-CM    1. Adhesive capsulitis of left shoulder  M75.02         Eval/Re-Eval POC Expires Auth #/ Referral # Total Visits Start Date Expiration Date Extension Info Visits Limitation      [facility] no auth          BOMN                                                 1 2 3 4 5 6      7 8 9 10 11 12   3/11 3/15 3/26 4/1 4/5 4/8   13 14 15 16 17 18           19 20 21 22 23 24           25 26 27 28 29 30             Start Time: 0800  Stop Time: 0845  Total time in clinic (min): 45 minutes    Subjective: Patient stated that his shoulder feels good. He was unable to d his exercises at home this Saturday.      Objective: See treatment diary below      Assessment: Tolerated treatment well. Patient continues to progress with exercises as tolerated. Patient continues to have deficits in abduction and ER. Patient demonstrated fatigue post treatment, exhibited good technique with therapeutic exercises, and would benefit from continued PT      Plan: Continue per plan of care.      Precautions: standard precautions,   Past Medical History:   Diagnosis Date    Allergic     Penicillin    Basal cell carcinoma     Chest pain     Diabetes mellitus (HCC)     History of cardiac cath     Hypertension     Lyme disease          PT 1:1 entire time  Manuals    PROM L shoulder IB         IB   Jt mobes IB PA, Inf         IB PA, Inf   STM/Massage Gun                          Neuro Re-Ed             Scapular Retractions             TB GH Row 20x Black TB        20x Black TB    TB GH Ext 20x Back TB        20x Black TB    TB GH IR/ER 20x ea BTB        15x ea BTB 15x ea BTB   TB 90/90 ER 15x YTB         15x YTB   TB GH Horizontal Abduction             Tricep Pushdowns         20x Black TB    Biceps Curl         20x Black TB   "  Cane AAROM 15x ea        15x ea 15x ea   Supine Cane AAROM 20x ea flex/ER        20x ea flex/ER 20x ea flex/ER   Supine AROM 20x 5# flex, 20x abd        20x flex 4# 20x flex 4#   Scapular Protractions         20x 2#    Banded Shoulder Mobes 20x 5\" hold AP with ER         20x 5\" hold AP with ER   Ther Ex             Pulleys 5'        5' 5'   Finger Ladder 15x 5\" abd        15x flex/abd 10x flex/abd   Doorway Stretch 30\" 3x mid, low        30\" 3x mid, low 30\" 3x mid, low   Lat Stretch 30\" 3x        30\" 3x 30\" 3x   GH IR Stretch             Cross Body Stretch         30\" 3x 30\" 3x   Pec Minor Stretch 10x 10\" hold with ball at wall        10x 10\" hold with ball at wall    Standing Back Extensions         20x with ball at wall    Sleeper Stretch 30\" 3x        30\" 3x 30\" 3x   Ther Activity             Reassessment of Function                          Gait Training                                       Modalities                                            "

## 2024-04-12 ENCOUNTER — OFFICE VISIT (OUTPATIENT)
Dept: PHYSICAL THERAPY | Facility: MEDICAL CENTER | Age: 54
End: 2024-04-12
Payer: COMMERCIAL

## 2024-04-12 DIAGNOSIS — M75.02 ADHESIVE CAPSULITIS OF LEFT SHOULDER: Primary | ICD-10-CM

## 2024-04-12 PROCEDURE — 97112 NEUROMUSCULAR REEDUCATION: CPT

## 2024-04-12 PROCEDURE — 97530 THERAPEUTIC ACTIVITIES: CPT

## 2024-04-12 NOTE — PROGRESS NOTES
PT Re-Evaluation     Today's date: 2024  Patient name: Darryl Ibanez  : 1970  MRN: [id number]  Referring provider: Alonzo Jordan*  Dx:   Encounter Diagnosis     ICD-10-CM    1. Adhesive capsulitis of left shoulder  M75.02         Eval/Re-Eval POC Expires Auth #/ Referral # Total Visits Start Date Expiration Date Extension Info Visits Limitation      [facility] no auth          BOMN                                                 1 2 3 4 5 6      7 8 9 10 11 12   3/11 3/15 3/26 4/1 4/5 4/8   13 14 15 16 17 18           19 20 21 22 23 24           25 26 27 28 29 30                          Assessment  Assessment details: IE:  Darryl Ibanez is a 53 y.o. male who presents to PT with a referral from Dr. Garcias for a medical diagnosis of Adhesive capsulitis of left shoulder  (primary encounter diagnosis). Patient presents to PT with limitations in ROM, strength, functional mobility, and postural stability secondary to pain, decreased muscular endurance, decreased neuromuscular control, and joint hypomobility. Patient demonstrated decreased global shoulder ROM and decreased BL shoulder ER and flexion. Patient noted some tenderness during palpation over biceps tendon and bicipital groove. Patient's key impairments include: decreased ROM, decreased strength, decreased endurance, pain with functional activities, and poor body mechanics. The limitations listed above affect patient's ability to reach overhead, reach behind back, reach, lift/carry, push, pull, shower, get dressed, and drive, perform recreational activities and ADLs and decrease patient's quality of life. Patient to benefit from skilled PT to address these limitations,  increase ROM, improve strength, reduce pain, improve activity tolerance, and improve quality of life     3/11/2024  Patient has completed 7 PT sessions to this date.  The patient has made improvements in shoulder ROM and strength, however  still continues to have deficits in end ranges of all ROM. Deficits present in shoulder ER strength during MMT testing. Patient's deficits affect the patient's ability to reach overhead, shower, get dressed, reach overhead, and perform ADLs. Patient was educated about shoulder progress thus far and how continued ROM and strengthening will further improve remaining limitations. HEP progressed and updated to reflect current patient function. Patient was educated about safety with exercises and educated on performing correctly and safely.  All other questions and concerns were addressed.  I believe this patient will continue to benefit from skilled PT to address remaining deficits in shoulder ROM and strength and improve activity tolerance and quality of life.    4/12/2024  Patient has completed 13 PT sessions to this date.  The patient has made improvements in shoulder ROM and strength, however still continues to have deficits in end range. Patient continues to have decreased ER, as well as slight deficits in flexion and abduction. Patients limitations affect their ability to perform ADLs and recreational activities such as shooting a basketball. Patient was educated about shoulder progress thus far and how continued ROM and strengthening will further improve remaining limitations. HEP progressed and updated to reflect current patient function. Patient was educated about safety with exercises and educated on performing correctly and safely.  All other questions and concerns were addressed.  I believe this patient will continue to benefit from skilled PT to address remaining deficits in shoulder strength and ROM and improve activity tolerance and quality of life.    Impairments: abnormal muscle firing, abnormal muscle tone, abnormal or restricted ROM, activity intolerance, impaired physical strength, lacks appropriate home exercise program, pain with function, poor posture  and poor body mechanics    Symptom  irritability: lowUnderstanding of Dx/Px/POC: good   Prognosis: good    Goals  STG (2-4 Weeks)  Patient will have an increase in global shoulder strength to a 4/5 MMT to promote increased stability in 4 weeks. MET  Patient will have a decrease in pain at worst by 2 points on the NPRS to improve quality of life in 4 weeks. MET  Patient will be efficient and compliant with comprehensive HEP in 4 weeks. MET    LTG (4-8 Weeks)  Patient will have a FOTO of anticipated or greater by discharge. MET  Patient will be able to reach to shoulder height without pain to help with getting items off shelves by discharge. MET  Patient will be able to reach overhead without pain to help with showering and dressing by discharge. MET    Plan  Patient would benefit from: skilled physical therapy  Planned modality interventions: TENS, thermotherapy: hydrocollator packs, cryotherapy, electrical stimulation/Russian stimulation, traction and unattended electrical stimulation  Planned therapy interventions: abdominal trunk stabilization, IASTM, joint mobilization, activity modification, kinesiology taping, ADL training, manual therapy, massage, [place] taping, balance, balance/weight bearing training, motor coordination training, behavior modification, muscle pump exercises, body mechanics training, nerve gliding, neuromuscular re-education, breathing training, patient education, postural training, coordination, self care, transfer training, therapeutic training, therapeutic exercise, therapeutic activities, stretching, strengthening, fine motor coordination training, flexibility, functional ROM exercises, gait training, graded activity, graded exercise, graded motor, home exercise program, IADL retraining and work reintegration  Frequency: 2x week  Duration in weeks: 12  Plan of Care beginning date: 4/12/2024  Plan of Care expiration date: 7/5/2024  Treatment plan discussed with: patient        Subjective Evaluation    History of Present  Illness  Onset date: about 6 months ago.  Mechanism of injury: Patient reports to PT with a CC of L shoulder pain and stiffness that started around 6 months ago with insidious onset. Patient stated that he had a shot about 2 weeks a go that helped a lot. Patient currently works in sales. Patient rated their current pain 1/10, at its worst 10/10, and at its best 1/10 on the NPRS. Patient described the pain as sharp and stated that it lasts less than 15 minutes. Patient stated that rest and relaxation makes it better, and getting dressed, driving, lifting things in front of him, reaching out out forward and overhead makes it worse. Patient reported that the pain is worse depending on usage and denies waking them at night. Patient denies changes to  bowel and bladder, chest pain, night pain, or fever. Patient reports no prior treatment or surgeries for CC. Patient's goals of PT are to decrease pain, improve ROM, increase strength, return to recreational activities, and return to work     3/11/2024  Patient stated that his shoulder feels good coming in. He stated that the pain has improved and its not constant like it was at the beginning but only if he over extends. Patient reports that he feels as though he is improving with motion, but still having trouble with external rotation.     2024  Patient stated that his strength and ROM has been getting better. He stated that he has made good progress but still is lacking in some ROM, especially in external rotation          Recurrent probem    Quality of life: good    Patient Goals  Patient goals for therapy: decreased pain, increased motion, increased strength, return to sport/leisure activities and return to work  Patient goal: Get back to golfing  Pain  Current pain ratin  At best pain ratin  At worst pain ratin  Quality: sharp  Relieving factors: rest and relaxation  Aggravating factors: overhead activity and lifting  Progression: no  change          Objective     Postural Observations  Seated posture: fair  Standing posture: fair      Palpation   Left   No palpable tenderness to the anterior deltoid, biceps, infraspinatus, latissimus, lower trapezius, middle deltoid, rhomboids, triceps and upper trapezius.     Right   No palpable tenderness to the anterior deltoid, biceps, infraspinatus, latissimus, lower trapezius, middle deltoid, rhomboids, triceps and upper trapezius.     Neurological Testing     Sensation     Shoulder   Left Shoulder   Intact: light touch    Right Shoulder   Intact: Light touch    Active Range of Motion   Left Shoulder   Flexion: 134 degrees   Extension: 65 degrees   Abduction: 146 degrees   External rotation BTH: T3   Internal rotation BTB: L3     Passive Range of Motion   Left Shoulder   Flexion: 143 degrees   Abduction: 130 degrees     Strength/Myotome Testing     Left Shoulder     Planes of Motion   Flexion: 5   Extension: 5   Abduction: 5   External rotation at 0°: 4   Internal rotation at 0°: 5     Right Shoulder     Planes of Motion   Flexion: 5   Extension: 5   Abduction: 5   External rotation at 0°: 5   Internal rotation at 0°: 5                  Precautions: standard precautions,   Past Medical History:   Diagnosis Date    Allergic 1977    Penicillin    Basal cell carcinoma     Chest pain     Diabetes mellitus (HCC)     History of cardiac cath     Hypertension     Lyme disease     Squamous cell skin cancer 01/03/2024    Left [place]; MOHS 03/04/2024       PT 1:1 entire time  Manuals 4/8 4/12 4/1 4/5   PROM L shoulder IB         IB   Jt mobes IB PA, Inf         IB PA, Inf   STM/Massage Gun                          Neuro Re-Ed             Scapular Retractions             TB GH Row 20x Black TB 20x Black TB       20x Black TB    TB GH Ext 20x Back TB 20x Black TB       20x Black TB    TB GH IR/ER 20x ea BTB 20x ea Black TB       15x ea BTB 15x ea BTB   TB 90/90 ER 15x YTB 15x RTB        15x YTB   TB GH Horizontal  "Abduction             Tricep Pushdowns         20x Black TB    Biceps Curl         20x Black TB    Cane AAROM 15x ea 20x ea       15x ea 15x ea   Supine Cane AAROM 20x ea flex/ER        20x ea flex/ER 20x ea flex/ER   Supine AROM 20x 5# flex, 20x abd        20x flex 4# 20x flex 4#   Scapular Protractions         20x 2#    Banded Shoulder Mobes 20x 5\" hold AP with ER         20x 5\" hold AP with ER   Ther Ex             Pulleys 5' 5'       5' 5'   Finger Ladder 15x 5\" abd 15x 5\" abd       15x flex/abd 10x flex/abd   Doorway Stretch 30\" 3x mid, low 30\" 3x mid, low       30\" 3x mid, low 30\" 3x mid, low   Lat Stretch 30\" 3x        30\" 3x 30\" 3x   GH IR Stretch             Cross Body Stretch         30\" 3x 30\" 3x   Pec Minor Stretch 10x 10\" hold with ball at wall        10x 10\" hold with ball at wall    Standing Back Extensions         20x with ball at wall    Sleeper Stretch 30\" 3x        30\" 3x 30\" 3x   Ther Activity             Reassessment of Function  10'                        Gait Training                                       Modalities                                           "

## 2024-05-14 ENCOUNTER — OFFICE VISIT (OUTPATIENT)
Dept: FAMILY MEDICINE CLINIC | Facility: CLINIC | Age: 54
End: 2024-05-14
Payer: COMMERCIAL

## 2024-05-14 VITALS
WEIGHT: 203 LBS | BODY MASS INDEX: 27.5 KG/M2 | SYSTOLIC BLOOD PRESSURE: 126 MMHG | DIASTOLIC BLOOD PRESSURE: 78 MMHG | HEART RATE: 76 BPM | OXYGEN SATURATION: 98 % | HEIGHT: 72 IN | TEMPERATURE: 97.7 F

## 2024-05-14 DIAGNOSIS — Z00.00 ANNUAL PHYSICAL EXAM: Primary | ICD-10-CM

## 2024-05-14 DIAGNOSIS — N52.9 ERECTILE DYSFUNCTION, UNSPECIFIED ERECTILE DYSFUNCTION TYPE: ICD-10-CM

## 2024-05-14 DIAGNOSIS — I10 ESSENTIAL HYPERTENSION: ICD-10-CM

## 2024-05-14 DIAGNOSIS — E11.9 TYPE 2 DIABETES MELLITUS WITHOUT COMPLICATION, WITH LONG-TERM CURRENT USE OF INSULIN (HCC): ICD-10-CM

## 2024-05-14 DIAGNOSIS — Z79.4 TYPE 2 DIABETES MELLITUS WITHOUT COMPLICATION, WITH LONG-TERM CURRENT USE OF INSULIN (HCC): ICD-10-CM

## 2024-05-14 LAB — SL AMB POCT HEMOGLOBIN AIC: 6.6 (ref ?–6.5)

## 2024-05-14 PROCEDURE — 99214 OFFICE O/P EST MOD 30 MIN: CPT | Performed by: FAMILY MEDICINE

## 2024-05-14 PROCEDURE — 99396 PREV VISIT EST AGE 40-64: CPT | Performed by: FAMILY MEDICINE

## 2024-05-14 PROCEDURE — 83036 HEMOGLOBIN GLYCOSYLATED A1C: CPT | Performed by: FAMILY MEDICINE

## 2024-05-14 RX ORDER — SILDENAFIL 50 MG/1
50 TABLET, FILM COATED ORAL DAILY PRN
Qty: 10 TABLET | Refills: 0 | Status: SHIPPED | OUTPATIENT
Start: 2024-05-14

## 2024-05-14 NOTE — PROGRESS NOTES
ADULT ANNUAL PHYSICAL  [facility]    NAME: Darryl Ibanez  AGE: 53 y.o. SEX: male  : 1970     DATE: 2024     Assessment and Plan:     Problem List Items Addressed This Visit        Cardiovascular and Mediastinum    Essential hypertension     Well controlled. Cont present treatment. Monitor labs. Recheck 6m              Endocrine    Type 2 diabetes mellitus without complication, with long-term current use of insulin ([facility])       Lab Results   Component Value Date    HGBA1C 6.6 (A) 2024   I reviewed with pt. Pt doing much better since starting Tresiba.  Continue to work on diet and weight loss. Continue present meds. Recheck 3m         Relevant Orders    POCT hemoglobin A1c (Completed)       Other    Erectile dysfunction     Ireviewed withpt. We discussed treatment options. Start Viagra 50mg qd as directed. Discussed warnings and side effects, especially potential interactions with NTG. Pt to call in 2-3w if not effective - earlier if worse         Relevant Medications    sildenafil (VIAGRA) 50 MG tablet   Other Visit Diagnoses     Annual physical exam    -  Primary          Immunizations and preventive care screenings were discussed with patient today. Appropriate education was printed on patient's after visit summary.    Discussed risks and benefits of prostate cancer screening. We discussed the controversial history of PSA screening for prostate cancer in the [place] as well as the risk of over detection and over treatment of prostate cancer by way of PSA screening.  The patient understands that PSA blood testing is an imperfect way to screen for prostate cancer and that elevated PSA levels in the blood may also be caused by infection, inflammation, prostatic trauma or manipulation, urological procedures, or by benign prostatic enlargement.    The role of the digital rectal examination in prostate cancer screening was also  discussed and I discussed with him that there is large interobserver variability in the findings of digital rectal examination.    Counseling:  Exercise: the importance of regular exercise/physical activity was discussed. Recommend exercise 3-5 times per week for at least 30 minutes.          Return in about 6 months (around 11/14/2024).     Chief Complaint:     Chief Complaint   Patient presents with   • Follow-up     3 month      History of Present Illness:     Adult Annual Physical   Patient here for a comprehensive physical exam. The patient reports problems - as below .  - pt doing well since starting Tresiba and metformin.  I reviewed home sugars.  They have greatly improved.  Most sugars both fasting and nonfasting seem to be in the 100s to 150s.  He does have occasional high sugars in the 180s to low 200s though these typically are associated with cheating and seem to be few and far between.  A1c in the office today = 6.6 (down from10.1 in January). He denies changes in vision    Diet and Physical Activity  Diet/Nutrition: diabetic diet.   Exercise:  improving - presently exercising 3x a week .      Depression Screening  PHQ-2/9 Depression Screening    Little interest or pleasure in doing things: 0 - not at all  Feeling down, depressed, or hopeless: 0 - not at all  PHQ-2 Score: 0  PHQ-2 Interpretation: Negative depression screen       General Health  Sleep: sleeps well, gets 4-6 hours of sleep on average, and still effected by stress .   Hearing: normal - bilateral.  Vision: no vision problems, most recent eye exam <1 year ago, and wears glasses.   Dental: no dental visits for >1 year and brushes teeth twice daily.        Health  Symptoms include: none     Review of Systems:     Review of Systems   Constitutional: Negative.    HENT: Negative.     Eyes: Negative.    Respiratory: Negative.     Cardiovascular: Negative.    Gastrointestinal: Negative.    Endocrine: Negative.    Genitourinary: Negative.     Musculoskeletal: Negative.    Skin: Negative.    Allergic/Immunologic: Negative.    Neurological: Negative.    Hematological: Negative.    Psychiatric/Behavioral: Negative.        Past Medical History:     Past Medical History:   Diagnosis Date   • Allergic 1977    Penicillin   • Basal cell carcinoma    • Chest pain    • Diabetes mellitus (HCC)    • History of cardiac cath    • Hypertension    • Lyme disease    • Squamous cell skin cancer 01/03/2024    Left [facility]; MOHS 03/04/2024      Past Surgical History:     Past Surgical History:   Procedure Laterality Date   • CARDIAC CATHETERIZATION Left    • MOHS SURGERY Left 03/04/2024    SCCIS Left [facility]; Dr. Schmid   • NOSE SURGERY        Family History:     Family History   Problem Relation Age of Onset   • Coronary artery disease Mother    • Thyroid disease unspecified Mother    • Coronary artery disease Father    • Stroke Father    • Cancer Sister    • Coronary artery disease Paternal Grandfather       Social History:     Social History     Socioeconomic History   • Marital status: /Civil Union     Spouse name: None   • Number of children: None   • Years of education: None   • Highest education level: None   Occupational History   • None   Tobacco Use   • Smoking status: Never     Passive exposure: Past   • Smokeless tobacco: Current     Types: Chew   Vaping Use   • Vaping status: Never Used   Substance and Sexual Activity   • Alcohol use: Yes     Alcohol/week: 4.0 standard drinks of alcohol     Types: 4 Cans of beer per week     Comment: social   • Drug use: No   • Sexual activity: Yes     Partners: Female     Birth control/protection: Post-menopausal   Other Topics Concern   • None   Social History Narrative   • None     Social Determinants of Health     Financial Resource Strain: Not on file   Food Insecurity: Not on file   Transportation Needs: Not on file   Physical Activity: Not on file   Stress: Not on file   Social Connections: Not on file   Intimate  "Partner Violence: Not on file   Housing Stability: Not on file      Current Medications:     Current Outpatient Medications   Medication Sig Dispense Refill   • aspirin 81 MG tablet Take 1 tablet by mouth daily     • atorvastatin (LIPITOR) 40 mg tablet Take 1 tablet (40 mg total) by mouth daily 90 tablet 2   • clopidogrel (PLAVIX) 75 mg tablet Take 1 tablet (75 mg total) by mouth daily 90 tablet 2   • insulin degludec (Tresiba FlexTouch) 200 units/mL CONCENTRATED U-200 injection pen Inject 20 Units under the skin daily 9 mL 1   • Insulin Pen Needle (BD Pen Needle Kay U/F) 32G X 4 MM MISC Use twice daily as directed with insulin pen 200 each 3   • lisinopril (ZESTRIL) 5 mg tablet Take 1 tablet (5 mg total) by mouth daily 90 tablet 2   • metFORMIN (GLUCOPHAGE-XR) 500 mg 24 hr tablet Take 2 tablets (1,000 mg total) by mouth daily with breakfast 180 tablet 1   • metoprolol succinate (TOPROL-XL) 25 mg 24 hr tablet Take 1 tablet (25 mg total) by mouth daily 90 tablet 2   • OneTouch Delica Lancets 33G MISC Check twice a day 100 each 1   • sildenafil (VIAGRA) 50 MG tablet Take 1 tablet (50 mg total) by mouth daily as needed for erectile dysfunction 10 tablet 0     No current facility-administered medications for this visit.      Allergies:     Allergies   Allergen Reactions   • Penicillins       Physical Exam:     /78   Pulse 76   Temp 97.7 °F (36.5 °C)   Ht 6' 0.25\" (1.835 m)   Wt 92.1 kg (203 lb)   SpO2 98%   BMI 27.34 kg/m²     Physical Exam  Vitals reviewed.   HENT:      Head: Atraumatic.      Right Ear: Tympanic membrane, ear canal and external ear normal.      Left Ear: Tympanic membrane, ear canal and external ear normal.      Nose: Nose normal.      Mouth/Throat:      Mouth: Mucous membranes are moist.   Eyes:      Extraocular Movements: Extraocular movements intact.      Conjunctiva/sclera: Conjunctivae normal.      Pupils: Pupils are equal, round, and reactive to light.   Cardiovascular:      Rate and " Rhythm: Normal rate and regular rhythm.      Pulses: Normal pulses.      Heart sounds: Normal heart sounds.   Pulmonary:      Effort: Pulmonary effort is normal.      Breath sounds: Normal breath sounds.   Abdominal:      General: Abdomen is flat. There is no distension.      Palpations: There is no mass.      Tenderness: There is no abdominal tenderness.   Musculoskeletal:         General: No swelling, tenderness or deformity. Normal range of motion.      Cervical back: Normal range of motion. No tenderness.      Right lower leg: No edema.      Left lower leg: No edema.   Lymphadenopathy:      Cervical: No cervical adenopathy.   Skin:     General: Skin is warm.   Neurological:      General: No focal deficit present.      Mental Status: He is alert and oriented to person, place, and time.      Cranial Nerves: No cranial nerve deficit.      Sensory: No sensory deficit.      Motor: No weakness.      Coordination: Coordination normal.      Gait: Gait normal.      Deep Tendon Reflexes: Reflexes normal.   Psychiatric:         Mood and Affect: Mood normal.         Behavior: Behavior normal.         Thought Content: Thought content normal.         Judgment: Judgment normal.      Comments: PHQ-2/9 Depression Screening    Little interest or pleasure in doing things: 0 - not at all  Feeling down, depressed, or hopeless: 0 - not at all  PHQ-2 Score: 0  PHQ-2 Interpretation: Negative depression screen            Paul Jordan MD  [facility]

## 2024-05-20 PROBLEM — N52.9 ERECTILE DYSFUNCTION: Status: ACTIVE | Noted: 2024-05-20

## 2024-05-20 NOTE — ASSESSMENT & PLAN NOTE
Ireviewed withpt. We discussed treatment options. Start Viagra 50mg qd as directed. Discussed warnings and side effects, especially potential interactions with NTG. Pt to call in 2-3w if not effective - earlier if worse

## 2024-05-20 NOTE — ASSESSMENT & PLAN NOTE
Lab Results   Component Value Date    HGBA1C 6.6 (A) 05/14/2024   I reviewed with pt. Pt doing much better since starting Tresiba.  Continue to work on diet and weight loss. Continue present meds. Recheck 3m

## 2024-07-16 ENCOUNTER — OFFICE VISIT (OUTPATIENT)
Dept: DERMATOLOGY | Facility: CLINIC | Age: 54
End: 2024-07-16
Payer: COMMERCIAL

## 2024-07-16 VITALS — WEIGHT: 210 LBS | BODY MASS INDEX: 28.44 KG/M2 | HEIGHT: 72 IN | TEMPERATURE: 97.8 F

## 2024-07-16 DIAGNOSIS — Z85.828 HISTORY OF SCC (SQUAMOUS CELL CARCINOMA) OF SKIN: ICD-10-CM

## 2024-07-16 DIAGNOSIS — D18.01 CHERRY ANGIOMA: ICD-10-CM

## 2024-07-16 DIAGNOSIS — L82.1 SEBORRHEIC KERATOSIS: ICD-10-CM

## 2024-07-16 DIAGNOSIS — L85.3 XEROSIS OF SKIN: ICD-10-CM

## 2024-07-16 DIAGNOSIS — L81.4 LENTIGO: ICD-10-CM

## 2024-07-16 DIAGNOSIS — D22.9 MULTIPLE MELANOCYTIC NEVI: Primary | ICD-10-CM

## 2024-07-16 PROCEDURE — 99213 OFFICE O/P EST LOW 20 MIN: CPT | Performed by: DERMATOLOGY

## 2024-07-16 NOTE — PROGRESS NOTES
"[facility] Note     Patient Name: Darryl Ibanez  Encounter Date: 07/16/2024    Have you been cared for by a [facility] Dermatologist in the last 3 years and, if so, which description applies to you?    Yes.  I have been here within the last 3 years, and my medical history has NOT changed since that time.  I am MALE/not capable of bearing children.    REVIEW OF SYSTEMS:  Have you recently had or currently have any of the following? No changes in my recent health.   PAST MEDICAL HISTORY:  Have you personally ever had or currently have any of the following?  If \"YES,\" then please provide more detail. No changes in my medical history.   HISTORY OF IMMUNOSUPPRESSION: Do you have a history of any of the following:  Systemic Immunosuppression such as Diabetes, Biologic or Immunotherapy, Chemotherapy, Organ Transplantation, Bone Marrow Transplantation?  No     Answering \"YES\" requires the addition of the dotphrase \"IMMUNOSUPPRESSED\" as the first diagnosis of the patient's visit.   FAMILY HISTORY:  Any \"first degree relatives\" (parent, brother, sister, or child) with the following?    No changes in my family's known health.   PATIENT EXPERIENCE:    Do you want the Dermatologist to perform a COMPLETE skin exam today including a clinical examination under the \"bra and underwear\" areas?  Yes feet, groin or buttocks not examined                                                                     If necessary, do we have your permission to call and leave a detailed message on your Preferred Phone number that includes your specific medical information?  Yes      Allergies   Allergen Reactions    Penicillins       Current Outpatient Medications:     aspirin 81 MG tablet, Take 1 tablet by mouth daily, Disp: , Rfl:     atorvastatin (LIPITOR) 40 mg tablet, Take 1 tablet (40 mg total) by mouth daily, Disp: 90 tablet, Rfl: 2    clopidogrel (PLAVIX) 75 mg tablet, Take 1 tablet (75 mg total) by mouth daily, Disp: 90 " "tablet, Rfl: 2    insulin degludec (Tresiba FlexTouch) 200 units/mL CONCENTRATED U-200 injection pen, Inject 20 Units under the skin daily, Disp: 9 mL, Rfl: 1    Insulin Pen Needle (BD Pen Needle Kay U/F) 32G X 4 MM MISC, Use twice daily as directed with insulin pen, Disp: 200 each, Rfl: 3    lisinopril (ZESTRIL) 5 mg tablet, Take 1 tablet (5 mg total) by mouth daily, Disp: 90 tablet, Rfl: 2    metFORMIN (GLUCOPHAGE-XR) 500 mg 24 hr tablet, Take 2 tablets (1,000 mg total) by mouth daily with breakfast, Disp: 180 tablet, Rfl: 1    metoprolol succinate (TOPROL-XL) 25 mg 24 hr tablet, Take 1 tablet (25 mg total) by mouth daily, Disp: 90 tablet, Rfl: 2    OneTouch Delica Lancets 33G MISC, Check twice a day, Disp: 100 each, Rfl: 1    sildenafil (VIAGRA) 50 MG tablet, Take 1 tablet (50 mg total) by mouth daily as needed for erectile dysfunction, Disp: 10 tablet, Rfl: 0          Whom besides the patient is providing clinical information about today's encounter?   NO ADDITIONAL HISTORIAN (patient alone provided history)    Physical Exam and Assessment/Plan by Diagnosis:    CHIEF COMPLAINT    53 year old male patient presents today for 6 Month Check Up.  Patient has a History of skin cancer    MELANOCYTIC NEVI (\"Moles\")    Physical Exam:  Anatomic Location Affected:   Mostly on sun-exposed areas of the trunk and extremities  Morphological Description:  Scattered, 1-4mm round to ovoid, symmetrical-appearing, even bordered, skin colored to dark brown macules/papules, mostly in sun-exposed areas  Pertinent Positives:  Pertinent Negatives:    Additional History of Present Condition:      Assessment and Plan:  Based on a thorough discussion of this condition and the management approach to it (including a comprehensive discussion of the known risks, side effects and potential benefits of treatment), the patient (family) agrees to implement the following specific plan:  When outside we recommend using a wide brim hat, sunglasses, " "long sleeve and pants, sunscreen with SPF 30+ with reapplication every 2 hours, or SPF specific clothing   Benign, reassured  Annual skin check     Melanocytic Nevi  Melanocytic nevi (\"moles\") are tan or brown, raised or flat areas of the skin which have an increased number of melanocytes. Melanocytes are the cells in our body which make pigment and account for skin color.    Some moles are present at birth (I.e., \"congenital nevi\"), while others come up later in life (i.e., \"acquired nevi\").  The sun can stimulate the body to make more moles.  Sunburns are not the only thing that triggers more moles.  Chronic sun exposure can do it too.     Clinically distinguishing a healthy mole from melanoma may be difficult, even for experienced dermatologists. The \"ABCDE's\" of moles have been suggested as a means of helping to alert a person to a suspicious mole and the possible increased risk of melanoma.  The suggestions for raising alert are as follows:    Asymmetry: Healthy moles tend to be symmetric, while melanomas are often asymmetric.  Asymmetry means if you draw a line through the mole, the two halves do not match in color, size, shape, or surface texture. Asymmetry can be a result of rapid enlargement of a mole, the development of a raised area on a previously flat lesion, scaling, ulceration, bleeding or scabbing within the mole.  Any mole that starts to demonstrate \"asymmetry\" should be examined promptly by a board certified dermatologist.     Border: Healthy moles tend to have discrete, even borders.  The border of a melanoma often blends into the normal skin and does not sharply delineate the mole from normal skin.  Any mole that starts to demonstrate \"uneven borders\" should be examined promptly by a board certified dermatologist.     Color: Healthy moles tend to be one color throughout.  Melanomas tend to be made up of different colors ranging from dark black, blue, white, or red.  Any mole that demonstrates a " "color change should be examined promptly by a board certified dermatologist.     Diameter: Healthy moles tend to be smaller than 0.6 cm in size; an exception are \"congenital nevi\" that can be larger.  Melanomas tend to grow and can often be greater than 0.6 cm (1/4 of an inch, or the size of a pencil eraser). This is only a guideline, and many normal moles may be larger than 0.6 cm without being unhealthy.  Any mole that starts to change in size (small to bigger or bigger to smaller) should be examined promptly by a board certified dermatologist.     Evolving: Healthy moles tend to \"stay the same.\"  Melanomas may often show signs of change or evolution such as a change in size, shape, color, or elevation.  Any mole that starts to itch, bleed, crust, burn, hurt, or ulcerate or demonstrate a change or evolution should be examined promptly by a board certified dermatologist.        LENTIGO    Physical Exam:  Anatomic Location Affected:  trunk, arms  Morphological Description:  Light brown macules  Pertinent Positives:  Pertinent Negatives:    Additional History of Present Condition:      Assessment and Plan:  Based on a thorough discussion of this condition and the management approach to it (including a comprehensive discussion of the known risks, side effects and potential benefits of treatment), the patient (family) agrees to implement the following specific plan:  When outside we recommend using a wide brim hat, sunglasses, long sleeve and pants, sunscreen with SPF 30+ with reapplication every 2 hours, or SPF specific clothing       What is a lentigo?  A lentigo is a pigmented flat or slightly raised lesion with a clearly defined edge. Unlike an ephelis (freckle), it does not fade in the winter months. There are several kinds of lentigo.  The name lentigo originally referred to its appearance resembling a small lentil. The plural of lentigo is lentigines, although “lentigos” is also in common use.    Who gets " lentigines?  Lentigines can affect males and females of all ages and races. Solar lentigines are especially prevalent in fair skinned adults. Lentigines associated with syndromes are present at birth or arise during childhood.    What causes lentigines?  Common forms of lentigo are due to exposure to ultraviolet radiation:  Sun damage including sunburn   Indoor tanning   Phototherapy, especially photochemotherapy (PUVA)    Ionizing radiation, eg radiation therapy, can also cause lentigines.  Several familial syndromes associated with widespread lentigines originate from mutations in Juanjose-MAP kinase, mTOR signaling and PTEN pathways.    What is the treatment for lentigines?  Most lentigines are left alone. Attempts to lighten them may not be successful. The following approaches are used:  SPF 50+ broad-spectrum sunscreen   Hydroquinone bleaching cream   Alpha hydroxy acids   Vitamin C   Retinoids   Azelaic acid   Chemical peels  Individual lesions can be permanently removed using:  Cryotherapy   Intense pulsed light   Pigment lasers    How can lentigines be prevented?  Lentigines associated with exposure ultraviolet radiation can be prevented by very careful sun protection. Clothing is more successful at preventing new lentigines than are sunscreens.    What is the outlook for lentigines?  Lentigines usually persist. They may increase in number with age and sun exposure. Some in sun-protected sites may fade and disappear.    BRODERICK ANGIOMAS    Physical Exam:  Anatomic Location Affected:  trunk  Morphological Description:  Scattered cherry red, 1-4 mm papules.  Pertinent Positives:  Pertinent Negatives:    Additional History of Present Condition:      Assessment and Plan:  Based on a thorough discussion of this condition and the management approach to it (including a comprehensive discussion of the known risks, side effects and potential benefits of treatment), the patient (family) agrees to implement the following  "specific plan:  Monitor for changes  Benign, reassured      Assessment and Plan:    Cherry angioma, also known as Kennedy de Manny spots, are benign vascular skin lesions. A \"cherry angioma\" is a firm red, blue or purple papule, 0.1-1 cm in diameter. When thrombosed, they can appear black in colour until evaluated with a dermatoscope when the red or purple colour is more easily seen. Cherry angioma may develop on any part of the body but most often appear on the scalp, face, lips and trunk.  An angioma is due to proliferating endothelial cells; these are the cells that line the inside of a blood vessel.    Angiomas can arise in early life or later in life; the most common type of angioma is a cherry angioma.  Cherry angiomas are very common in males and females of any age or race. They are more noticeable in white skin than in skin of colour. They markedly increase in number from about the age of 40. There may be a family history of similar lesions. Eruptive cherry angiomas have been rarely reported to be associated with internal malignancy. The cause of angiomas is unknown. Genetic analysis of cherry angiomas has shown that they frequently carry specific somatic missense mutations in the GNAQ and GNA11 (Q209H) genes, which are involved in other vascular and melanocytic proliferations.      SEBORRHEIC KERATOSIS; NON-INFLAMED    Physical Exam:  Anatomic Location Affected:  trunk  Morphological Description:  Flat and raised, waxy, smooth to warty textured, yellow to brownish-grey to dark brown to blackish, discrete, \"stuck-on\" appearing papules.  Pertinent Positives:  Pertinent Negatives:    Additional History of Present Condition:      Assessment and Plan:  Based on a thorough discussion of this condition and the management approach to it (including a comprehensive discussion of the known risks, side effects and potential benefits of treatment), the patient (family) agrees to implement the following specific " "plan:  Monitor for changes  Benign, reassured      Seborrheic Keratosis  A seborrheic keratosis is a harmless warty spot that appears during adult life as a common sign of skin aging.  Seborrheic keratoses can arise on any area of skin, covered or uncovered, with the usual exception of the palms and soles. They do not arise from mucous membranes. Seborrheic keratoses can have highly variable appearance.      Seborrheic keratoses are extremely common. It has been estimated that over 90% of adults over the age of 60 years have one or more of them. They occur in males and females of all races, typically beginning to erupt in the 30s or 40s. They are uncommon under the age of 20 years.  The precise cause of seborrhoeic keratoses is not known.  Seborrhoeic keratoses are considered degenerative in nature. As time goes by, seborrheic keratoses tend to become more numerous. Some people inherit a tendency to develop a very large number of them; some people may have hundreds of them.      There is no easy way to remove multiple lesions on a single occasion.  Unless a specific lesion is \"inflamed\" and is causing pain or stinging/burning or is bleeding, most insurance companies do not authorize treatment.    XEROSIS (\"DRY SKIN\")    Physical Exam:  Anatomic Location Affected:  diffuse  Morphological Description:  xerosis  Pertinent Positives:  Pertinent Negatives:    Additional History of Present Condition:      Assessment and Plan:  Based on a thorough discussion of this condition and the management approach to it (including a comprehensive discussion of the known risks, side effects and potential benefits of treatment), the patient (family) agrees to implement the following specific plan:  Use moisturizer like Eucerin,Cerave or Aveeno Cream 3 times a day for the dry skin            Dry skin refers to skin that feels dry to touch. Dry skin has a dull surface with a rough, scaly quality. The skin is less pliable and cracked. " When dryness is severe, the skin may become inflamed and fissured.  Although any body site can be dry, dry skin tends to affect the shins more than any other site.    Dry skin is lacking moisture in the outer horny cell layer (stratum corneum) and this results in cracks in the skin surface.  Dry skin is also called xerosis, xeroderma or asteatosis (lack of fat).  It can affect males and females of all ages. There is some racial variability in water and lipid content of the skin.  Dry skin that starts in early childhood may be one of about 20 types of ichthyosis (fish-scale skin). There is often a family history of dry skin.   Dry skin is commonly seen in people with atopic dermatitis.  Nearly everyone > 60 years has dry skin.    Dry skin that begins later may be seen in people with certain diseases and conditions.  Postmenopausal women  Hypothyroidism  Chronic renal disease   Malnutrition and weight loss   Subclinical dermatitis   Treatment with certain drugs such as oral retinoids, diuretics and epidermal growth factor receptor inhibitors      What is the treatment for dry skin?  The mainstay of treatment of dry skin and ichthyosis is moisturisers/emollients. They should be applied liberally and often enough to:  Reduce itch   Improve the barrier function   Prevent entry of irritants, bacteria   Reduce transepidermal water loss.      How can dry skin be prevented?  Eliminate aggravating factors:  Reduce the frequency of bathing.   A humidifier in winter and air conditioner in summer   Compare having a short shower with a prolonged soak in a bath.   Use lukewarm, not hot, water.   Replace standard soap with a substitute such as a synthetic detergent cleanser, water-miscible emollient, bath oil, anti-pruritic tar oil, colloidal oatmeal etc.   Apply an emollient liberally and often, particularly shortly after bathing, and when itchy. The drier the skin, the thicker this should be, especially on the hands.    What is  the outlook for dry skin?  A tendency to dry skin may persist life-long, or it may improve once contributing factors are controlled.    HISTORY OF SQUAMOUS CELL CARCINOMA     Physical Exam:  Anatomic Location Affected:  Left temple   Morphological Description of Scar:  well healed   Suspected Recurrence: no  Regional adenopathy: no    Additional History of Present Condition:  Patient had mohs with Dr Schmid 3/2024    Assessment and Plan:  Based on a thorough discussion of this condition and the management approach to it (including a comprehensive discussion of the known risks, side effects and potential benefits of treatment), the patient (family) agrees to implement the following specific plan:  Skin checks in 6 months   When outside we recommend using a wide brim hat, sunglasses, long sleeve and pants, sunscreen with SPF 30+ with reapplication every 2 hours, or SPF specific clothing      How can SCC be prevented?  The most important way to prevent SCC is to avoid sunburn. This is especially important in childhood and early life. Fair skinned individuals and those with a personal or family history of BCC should protect their skin from sun exposure daily, year-round and lifelong.  Stay indoors or under the shade in the middle of the day   Wear covering clothing   Apply high protection factor SPF50+ broad-spectrum sunscreens generously to exposed skin if outdoors   Avoid indoor tanning (sun beds, solaria)      What is the outlook for SCC?  Most SCC are cured by treatment. Cure is most likely if treatment is undertaken when the lesion is small. A small percent of SCC's can spread to lymph  nodes and long term monitoring is indicated.   They are also at increased risk of other skin cancers, especially melanoma. Regular self-skin examinations and long-term annual skin checks by an experienced health professional are recommended.   Scribe Attestation      I,:  Hina Henry MA am acting as a scribe while in the  presence of the attending physician.:       I,:  Mirtha Schofield MD personally performed the services described in this documentation    as scribed in my presence.:

## 2024-07-16 NOTE — PATIENT INSTRUCTIONS
"MELANOCYTIC NEVI (\"Moles\")        Assessment and Plan:  Based on a thorough discussion of this condition and the management approach to it (including a comprehensive discussion of the known risks, side effects and potential benefits of treatment), the patient (family) agrees to implement the following specific plan:  When outside we recommend using a wide brim hat, sunglasses, long sleeve and pants, sunscreen with SPF 30+ with reapplication every 2 hours, or SPF specific clothing   Benign, reassured  Annual skin check     Melanocytic Nevi  Melanocytic nevi (\"moles\") are tan or brown, raised or flat areas of the skin which have an increased number of melanocytes. Melanocytes are the cells in our body which make pigment and account for skin color.    Some moles are present at birth (I.e., \"congenital nevi\"), while others come up later in life (i.e., \"acquired nevi\").  The sun can stimulate the body to make more moles.  Sunburns are not the only thing that triggers more moles.  Chronic sun exposure can do it too.     Clinically distinguishing a healthy mole from melanoma may be difficult, even for experienced dermatologists. The \"ABCDE's\" of moles have been suggested as a means of helping to alert a person to a suspicious mole and the possible increased risk of melanoma.  The suggestions for raising alert are as follows:    Asymmetry: Healthy moles tend to be symmetric, while melanomas are often asymmetric.  Asymmetry means if you draw a line through the mole, the two halves do not match in color, size, shape, or surface texture. Asymmetry can be a result of rapid enlargement of a mole, the development of a raised area on a previously flat lesion, scaling, ulceration, bleeding or scabbing within the mole.  Any mole that starts to demonstrate \"asymmetry\" should be examined promptly by a board certified dermatologist.     Border: Healthy moles tend to have discrete, even borders.  The border of a melanoma often blends " "into the normal skin and does not sharply delineate the mole from normal skin.  Any mole that starts to demonstrate \"uneven borders\" should be examined promptly by a board certified dermatologist.     Color: Healthy moles tend to be one color throughout.  Melanomas tend to be made up of different colors ranging from dark black, blue, white, or red.  Any mole that demonstrates a color change should be examined promptly by a board certified dermatologist.     Diameter: Healthy moles tend to be smaller than 0.6 cm in size; an exception are \"congenital nevi\" that can be larger.  Melanomas tend to grow and can often be greater than 0.6 cm (1/4 of an inch, or the size of a pencil eraser). This is only a guideline, and many normal moles may be larger than 0.6 cm without being unhealthy.  Any mole that starts to change in size (small to bigger or bigger to smaller) should be examined promptly by a board certified dermatologist.     Evolving: Healthy moles tend to \"stay the same.\"  Melanomas may often show signs of change or evolution such as a change in size, shape, color, or elevation.  Any mole that starts to itch, bleed, crust, burn, hurt, or ulcerate or demonstrate a change or evolution should be examined promptly by a board certified dermatologist.        LENTIGO        Assessment and Plan:  Based on a thorough discussion of this condition and the management approach to it (including a comprehensive discussion of the known risks, side effects and potential benefits of treatment), the patient (family) agrees to implement the following specific plan:  When outside we recommend using a wide brim hat, sunglasses, long sleeve and pants, sunscreen with SPF 30+ with reapplication every 2 hours, or SPF specific clothing       What is a lentigo?  A lentigo is a pigmented flat or slightly raised lesion with a clearly defined edge. Unlike an ephelis (freckle), it does not fade in the winter months. There are several kinds of " lentigo.  The name lentigo originally referred to its appearance resembling a small lentil. The plural of lentigo is lentigines, although “lentigos” is also in common use.    Who gets lentigines?  Lentigines can affect males and females of all ages and races. Solar lentigines are especially prevalent in fair skinned adults. Lentigines associated with syndromes are present at birth or arise during childhood.    What causes lentigines?  Common forms of lentigo are due to exposure to ultraviolet radiation:  Sun damage including sunburn   Indoor tanning   Phototherapy, especially photochemotherapy (PUVA)    Ionizing radiation, eg radiation therapy, can also cause lentigines.  Several familial syndromes associated with widespread lentigines originate from mutations in Juanjose-MAP kinase, mTOR signaling and PTEN pathways.    What is the treatment for lentigines?  Most lentigines are left alone. Attempts to lighten them may not be successful. The following approaches are used:  SPF 50+ broad-spectrum sunscreen   Hydroquinone bleaching cream   Alpha hydroxy acids   Vitamin C   Retinoids   Azelaic acid   Chemical peels  Individual lesions can be permanently removed using:  Cryotherapy   Intense pulsed light   Pigment lasers    How can lentigines be prevented?  Lentigines associated with exposure ultraviolet radiation can be prevented by very careful sun protection. Clothing is more successful at preventing new lentigines than are sunscreens.    What is the outlook for lentigines?  Lentigines usually persist. They may increase in number with age and sun exposure. Some in sun-protected sites may fade and disappear.    BRODERICK ANGIOMAS        Assessment and Plan:  Based on a thorough discussion of this condition and the management approach to it (including a comprehensive discussion of the known risks, side effects and potential benefits of treatment), the patient (family) agrees to implement the following specific plan:  Monitor  "for changes  Benign, reassured      Assessment and Plan:    Cherry angioma, also known as Kennedy de Manny spots, are benign vascular skin lesions. A \"cherry angioma\" is a firm red, blue or purple papule, 0.1-1 cm in diameter. When thrombosed, they can appear black in colour until evaluated with a dermatoscope when the red or purple colour is more easily seen. Cherry angioma may develop on any part of the body but most often appear on the scalp, face, lips and trunk.  An angioma is due to proliferating endothelial cells; these are the cells that line the inside of a blood vessel.    Angiomas can arise in early life or later in life; the most common type of angioma is a cherry angioma.  Cherry angiomas are very common in males and females of any age or race. They are more noticeable in white skin than in skin of colour. They markedly increase in number from about the age of 40. There may be a family history of similar lesions. Eruptive cherry angiomas have been rarely reported to be associated with internal malignancy. The cause of angiomas is unknown. Genetic analysis of cherry angiomas has shown that they frequently carry specific somatic missense mutations in the GNAQ and GNA11 (Q209H) genes, which are involved in other vascular and melanocytic proliferations.      SEBORRHEIC KERATOSIS; NON-INFLAMED      Assessment and Plan:  Based on a thorough discussion of this condition and the management approach to it (including a comprehensive discussion of the known risks, side effects and potential benefits of treatment), the patient (family) agrees to implement the following specific plan:  Monitor for changes  Benign, reassured      Seborrheic Keratosis  A seborrheic keratosis is a harmless warty spot that appears during adult life as a common sign of skin aging.  Seborrheic keratoses can arise on any area of skin, covered or uncovered, with the usual exception of the palms and soles. They do not arise from mucous " "membranes. Seborrheic keratoses can have highly variable appearance.      Seborrheic keratoses are extremely common. It has been estimated that over 90% of adults over the age of 60 years have one or more of them. They occur in males and females of all races, typically beginning to erupt in the 30s or 40s. They are uncommon under the age of 20 years.  The precise cause of seborrhoeic keratoses is not known.  Seborrhoeic keratoses are considered degenerative in nature. As time goes by, seborrheic keratoses tend to become more numerous. Some people inherit a tendency to develop a very large number of them; some people may have hundreds of them.      There is no easy way to remove multiple lesions on a single occasion.  Unless a specific lesion is \"inflamed\" and is causing pain or stinging/burning or is bleeding, most insurance companies do not authorize treatment.    XEROSIS (\"DRY SKIN\")        Assessment and Plan:  Based on a thorough discussion of this condition and the management approach to it (including a comprehensive discussion of the known risks, side effects and potential benefits of treatment), the patient (family) agrees to implement the following specific plan:  Use moisturizer like Eucerin,Cerave or Aveeno Cream 3 times a day for the dry skin            Dry skin refers to skin that feels dry to touch. Dry skin has a dull surface with a rough, scaly quality. The skin is less pliable and cracked. When dryness is severe, the skin may become inflamed and fissured.  Although any body site can be dry, dry skin tends to affect the shins more than any other site.    Dry skin is lacking moisture in the outer horny cell layer (stratum corneum) and this results in cracks in the skin surface.  Dry skin is also called xerosis, xeroderma or asteatosis (lack of fat).  It can affect males and females of all ages. There is some racial variability in water and lipid content of the skin.  Dry skin that starts in early " childhood may be one of about 20 types of ichthyosis (fish-scale skin). There is often a family history of dry skin.   Dry skin is commonly seen in people with atopic dermatitis.  Nearly everyone > 60 years has dry skin.    Dry skin that begins later may be seen in people with certain diseases and conditions.  Postmenopausal women  Hypothyroidism  Chronic renal disease   Malnutrition and weight loss   Subclinical dermatitis   Treatment with certain drugs such as oral retinoids, diuretics and epidermal growth factor receptor inhibitors      What is the treatment for dry skin?  The mainstay of treatment of dry skin and ichthyosis is moisturisers/emollients. They should be applied liberally and often enough to:  Reduce itch   Improve the barrier function   Prevent entry of irritants, bacteria   Reduce transepidermal water loss.      How can dry skin be prevented?  Eliminate aggravating factors:  Reduce the frequency of bathing.   A humidifier in winter and air conditioner in summer   Compare having a short shower with a prolonged soak in a bath.   Use lukewarm, not hot, water.   Replace standard soap with a substitute such as a synthetic detergent cleanser, water-miscible emollient, bath oil, anti-pruritic tar oil, colloidal oatmeal etc.   Apply an emollient liberally and often, particularly shortly after bathing, and when itchy. The drier the skin, the thicker this should be, especially on the hands.    What is the outlook for dry skin?  A tendency to dry skin may persist life-long, or it may improve once contributing factors are controlled.    HISTORY OF SQUAMOUS CELL CARCINOMA         Assessment and Plan:  Based on a thorough discussion of this condition and the management approach to it (including a comprehensive discussion of the known risks, side effects and potential benefits of treatment), the patient (family) agrees to implement the following specific plan:  Skin checks in 6 months   When outside we recommend  using a wide brim hat, sunglasses, long sleeve and pants, sunscreen with SPF 30+ with reapplication every 2 hours, or SPF specific clothing      How can SCC be prevented?  The most important way to prevent SCC is to avoid sunburn. This is especially important in childhood and early life. Fair skinned individuals and those with a personal or family history of BCC should protect their skin from sun exposure daily, year-round and lifelong.  Stay indoors or under the shade in the middle of the day   Wear covering clothing   Apply high protection factor SPF50+ broad-spectrum sunscreens generously to exposed skin if outdoors   Avoid indoor tanning (sun beds, solaria)      What is the outlook for SCC?  Most SCC are cured by treatment. Cure is most likely if treatment is undertaken when the lesion is small. A small percent of SCC's can spread to lymph  nodes and long term monitoring is indicated.   They are also at increased risk of other skin cancers, especially melanoma. Regular self-skin examinations and long-term annual skin checks by an experienced health professional are recommended.

## 2024-08-07 DIAGNOSIS — E11.9 TYPE 2 DIABETES MELLITUS WITHOUT COMPLICATION, WITH LONG-TERM CURRENT USE OF INSULIN (HCC): ICD-10-CM

## 2024-08-07 DIAGNOSIS — Z79.4 TYPE 2 DIABETES MELLITUS WITHOUT COMPLICATION, WITH LONG-TERM CURRENT USE OF INSULIN (HCC): ICD-10-CM

## 2024-08-08 RX ORDER — INSULIN DEGLUDEC 200 U/ML
20 INJECTION, SOLUTION SUBCUTANEOUS DAILY
Qty: 9 ML | Refills: 1 | Status: SHIPPED | OUTPATIENT
Start: 2024-08-08

## 2024-08-08 RX ORDER — METFORMIN HYDROCHLORIDE 500 MG/1
TABLET, EXTENDED RELEASE ORAL
Qty: 180 TABLET | Refills: 1 | Status: SHIPPED | OUTPATIENT
Start: 2024-08-08

## 2024-08-28 ENCOUNTER — APPOINTMENT (OUTPATIENT)
Dept: LAB | Facility: MEDICAL CENTER | Age: 54
End: 2024-08-28
Payer: COMMERCIAL

## 2024-08-28 DIAGNOSIS — Z00.8 HEALTH EXAMINATION IN POPULATION SURVEY: ICD-10-CM

## 2024-08-28 DIAGNOSIS — E11.42 TYPE 2 DIABETES MELLITUS WITH DIABETIC POLYNEUROPATHY, WITHOUT LONG-TERM CURRENT USE OF INSULIN (HCC): ICD-10-CM

## 2024-08-28 LAB
CHOLEST SERPL-MCNC: 125 MG/DL
CREAT UR-MCNC: 187.4 MG/DL
ERYTHROCYTE [DISTWIDTH] IN BLOOD BY AUTOMATED COUNT: 13 % (ref 11.6–15.1)
EST. AVERAGE GLUCOSE BLD GHB EST-MCNC: 143 MG/DL
HBA1C MFR BLD: 6.6 %
HCT VFR BLD AUTO: 45.7 % (ref 36.5–49.3)
HDLC SERPL-MCNC: 50 MG/DL
HGB BLD-MCNC: 14.9 G/DL (ref 12–17)
LDLC SERPL CALC-MCNC: 56 MG/DL (ref 0–100)
MCH RBC QN AUTO: 29.3 PG (ref 26.8–34.3)
MCHC RBC AUTO-ENTMCNC: 32.6 G/DL (ref 31.4–37.4)
MCV RBC AUTO: 90 FL (ref 82–98)
MICROALBUMIN UR-MCNC: <7 MG/L
NONHDLC SERPL-MCNC: 75 MG/DL
PLATELET # BLD AUTO: 234 THOUSANDS/UL (ref 149–390)
PMV BLD AUTO: 11.1 FL (ref 8.9–12.7)
RBC # BLD AUTO: 5.08 MILLION/UL (ref 3.88–5.62)
TRIGL SERPL-MCNC: 93 MG/DL
WBC # BLD AUTO: 6.12 THOUSAND/UL (ref 4.31–10.16)

## 2024-08-28 PROCEDURE — 85027 COMPLETE CBC AUTOMATED: CPT

## 2024-08-28 PROCEDURE — 36415 COLL VENOUS BLD VENIPUNCTURE: CPT

## 2024-08-28 PROCEDURE — 80061 LIPID PANEL: CPT

## 2024-08-28 PROCEDURE — 82043 UR ALBUMIN QUANTITATIVE: CPT

## 2024-08-28 PROCEDURE — 82570 ASSAY OF URINE CREATININE: CPT

## 2024-08-28 PROCEDURE — 83036 HEMOGLOBIN GLYCOSYLATED A1C: CPT

## 2024-10-04 DIAGNOSIS — I10 ESSENTIAL HYPERTENSION: ICD-10-CM

## 2024-10-04 DIAGNOSIS — E78.2 MIXED HYPERLIPIDEMIA: ICD-10-CM

## 2024-10-04 DIAGNOSIS — E78.5 DYSLIPIDEMIA: ICD-10-CM

## 2024-10-04 RX ORDER — CLOPIDOGREL BISULFATE 75 MG/1
75 TABLET ORAL DAILY
Qty: 90 TABLET | Refills: 0 | Status: SHIPPED | OUTPATIENT
Start: 2024-10-04

## 2024-10-04 RX ORDER — METOPROLOL SUCCINATE 25 MG/1
25 TABLET, EXTENDED RELEASE ORAL DAILY
Qty: 90 TABLET | Refills: 0 | Status: SHIPPED | OUTPATIENT
Start: 2024-10-04

## 2024-10-04 RX ORDER — ATORVASTATIN CALCIUM 40 MG/1
40 TABLET, FILM COATED ORAL DAILY
Qty: 90 TABLET | Refills: 0 | Status: SHIPPED | OUTPATIENT
Start: 2024-10-04

## 2024-10-04 NOTE — TELEPHONE ENCOUNTER
Refill must be reviewed and completed by the office or provider. The refill is unable to be approved or denied by the medication management team.    Request for 90 day supply, last seen x10 months and was to return 11.2024 - Please review to see if the refill is appropriate.     Return in about 1 year (around 11/28/2024).

## 2024-10-25 DIAGNOSIS — N52.9 ERECTILE DYSFUNCTION, UNSPECIFIED ERECTILE DYSFUNCTION TYPE: ICD-10-CM

## 2024-10-25 RX ORDER — SILDENAFIL 50 MG/1
TABLET, FILM COATED ORAL
Qty: 10 TABLET | Refills: 0 | Status: SHIPPED | OUTPATIENT
Start: 2024-10-25

## 2024-11-15 ENCOUNTER — OFFICE VISIT (OUTPATIENT)
Dept: FAMILY MEDICINE CLINIC | Facility: CLINIC | Age: 54
End: 2024-11-15
Payer: COMMERCIAL

## 2024-11-15 VITALS
WEIGHT: 216 LBS | OXYGEN SATURATION: 98 % | HEIGHT: 72 IN | HEART RATE: 60 BPM | DIASTOLIC BLOOD PRESSURE: 86 MMHG | BODY MASS INDEX: 29.26 KG/M2 | TEMPERATURE: 97.6 F | SYSTOLIC BLOOD PRESSURE: 130 MMHG

## 2024-11-15 DIAGNOSIS — E11.9 TYPE 2 DIABETES MELLITUS WITHOUT COMPLICATION, WITH LONG-TERM CURRENT USE OF INSULIN (HCC): Primary | ICD-10-CM

## 2024-11-15 DIAGNOSIS — E78.2 MIXED HYPERLIPIDEMIA: ICD-10-CM

## 2024-11-15 DIAGNOSIS — I25.10 CORONARY ARTERY DISEASE INVOLVING NATIVE CORONARY ARTERY OF NATIVE HEART WITHOUT ANGINA PECTORIS: ICD-10-CM

## 2024-11-15 DIAGNOSIS — Z79.4 TYPE 2 DIABETES MELLITUS WITHOUT COMPLICATION, WITH LONG-TERM CURRENT USE OF INSULIN (HCC): Primary | ICD-10-CM

## 2024-11-15 DIAGNOSIS — I10 ESSENTIAL HYPERTENSION: ICD-10-CM

## 2024-11-15 PROCEDURE — 99214 OFFICE O/P EST MOD 30 MIN: CPT | Performed by: FAMILY MEDICINE

## 2024-11-15 RX ORDER — INSULIN DEGLUDEC 200 U/ML
20 INJECTION, SOLUTION SUBCUTANEOUS DAILY
Qty: 9 ML | Refills: 1 | Status: SHIPPED | OUTPATIENT
Start: 2024-11-15

## 2024-11-15 RX ORDER — METFORMIN HYDROCHLORIDE 500 MG/1
1000 TABLET, EXTENDED RELEASE ORAL
Qty: 180 TABLET | Refills: 1 | Status: SHIPPED | OUTPATIENT
Start: 2024-11-15

## 2024-11-15 NOTE — ASSESSMENT & PLAN NOTE
Lab Results   Component Value Date    HGBA1C 6.6 (H) 08/28/2024   Remains in good control.  Continue present care.  Recheck 6m    Orders:    insulin degludec (Tresiba FlexTouch) 200 units/mL CONCENTRATED U-200 injection pen; Inject 20 Units under the skin daily    metFORMIN (GLUCOPHAGE-XR) 500 mg 24 hr tablet; Take 2 tablets (1,000 mg total) by mouth daily with breakfast    Basic metabolic panel; Future    Hemoglobin A1C; Future

## 2024-11-15 NOTE — PROGRESS NOTES
Name: Darryl Ibanez      : 1970      MRN: [id number]  Encounter Provider: Paul Jordan MD  Encounter Date: 11/15/2024   Encounter department: [facility]    Assessment & Plan  Type 2 diabetes mellitus without complication, with long-term current use of insulin ([facility])    Lab Results   Component Value Date    HGBA1C 6.6 (H) 2024   Remains in good control.  Continue present care.  Recheck 6m    Orders:    insulin degludec (Tresiba FlexTouch) 200 units/mL CONCENTRATED U-200 injection pen; Inject 20 Units under the skin daily    metFORMIN (GLUCOPHAGE-XR) 500 mg 24 hr tablet; Take 2 tablets (1,000 mg total) by mouth daily with breakfast    Basic metabolic panel; Future    Hemoglobin A1C; Future    Essential hypertension  Well controlled. Cont present treatment. Monitor labs. Recheck 6m         Coronary artery disease involving native coronary artery of native heart without angina pectoris  Pt asymptomatic.  LDL at goal.  Continue present care.  Recheck 6m       Mixed hyperlipidemia  LDL = 56 in August.  Continue present care. Monitor diet.  Recheck 6m            History of Present Illness     f/u multiple med issues  - pt doing well  - has been compliant with medications though not always with his diet.  Does not check home Bgs regularly but typically in the 130s .Due for A1c at the end of the month  - pt has not been exercising regularly but is active.  Denies CP, palpitations, lightheadedness or other CV symptoms with or without exertion  - no new GI or  complaints        Review of Systems   Constitutional: Negative.    HENT: Negative.     Eyes: Negative.    Respiratory: Negative.     Cardiovascular: Negative.    Gastrointestinal: Negative.    Endocrine: Negative.    Genitourinary: Negative.    Musculoskeletal: Negative.    Skin: Negative.    Allergic/Immunologic: Negative.    Neurological: Negative.    Hematological: Negative.    Psychiatric/Behavioral: Negative.        Past Medical History:   Diagnosis Date    Allergic 1977    Penicillin    Basal cell carcinoma     Chest pain     Diabetes mellitus (HCC)     History of cardiac cath     Hypertension     Lyme disease     Squamous cell skin cancer 01/03/2024    Left Uatsdin; MOHS 03/04/2024     Past Surgical History:   Procedure Laterality Date    CARDIAC CATHETERIZATION Left     MOHS SURGERY Left 03/04/2024    SCCIS Left Uatsdin; Dr. Schmid    NOSE SURGERY       Family History   Problem Relation Age of Onset    Coronary artery disease Mother     Thyroid disease unspecified Mother     Coronary artery disease Father     Stroke Father     Cancer Sister     Coronary artery disease Paternal Grandfather      Social History     Tobacco Use    Smoking status: Never     Passive exposure: Past    Smokeless tobacco: Current     Types: Chew   Vaping Use    Vaping status: Never Used   Substance and Sexual Activity    Alcohol use: Yes     Alcohol/week: 4.0 standard drinks of alcohol     Types: 4 Cans of beer per week     Comment: social    Drug use: No    Sexual activity: Yes     Partners: Female     Birth control/protection: Post-menopausal     Current Outpatient Medications on File Prior to Visit   Medication Sig    aspirin 81 MG tablet Take 1 tablet by mouth daily    atorvastatin (LIPITOR) 40 mg tablet TAKE ONE TABLET BY MOUTH ONCE DAILY.    clopidogrel (PLAVIX) 75 mg tablet TAKE ONE TABLET BY MOUTH ONCE DAILY.    Insulin Pen Needle (BD Pen Needle Kay U/F) 32G X 4 MM MISC Use twice daily as directed with insulin pen    lisinopril (ZESTRIL) 5 mg tablet Take 1 tablet (5 mg total) by mouth daily    metoprolol succinate (TOPROL-XL) 25 mg 24 hr tablet TAKE ONE TABLET BY MOUTH ONCE DAILY.    OneTouch Delica Lancets 33G MISC Check twice a day    sildenafil (VIAGRA) 50 MG tablet Take 1 tablet (50 mg total)by mouth daily as needed for erectile dysfunction     Allergies   Allergen Reactions    Penicillins      Immunization History   Administered  "Date(s) Administered    COVID-19 MODERNA VACC 0.5 ML IM 04/01/2021, 04/29/2021     Objective   /86   Pulse 60   Temp 97.6 °F (36.4 °C)   Ht 6' 0.25\" (1.835 m)   Wt 98 kg (216 lb)   SpO2 98%   BMI 29.09 kg/m²     Physical Exam  Vitals reviewed.   Constitutional:       Appearance: Normal appearance.   HENT:      Head: Normocephalic.      Mouth/Throat:      Mouth: Mucous membranes are moist.   Eyes:      Extraocular Movements: Extraocular movements intact.      Conjunctiva/sclera: Conjunctivae normal.      Pupils: Pupils are equal, round, and reactive to light.   Cardiovascular:      Rate and Rhythm: Normal rate and regular rhythm.      Pulses: Normal pulses. no weak pulses.           Dorsalis pedis pulses are 2+ on the right side and 2+ on the left side.   Pulmonary:      Effort: Pulmonary effort is normal.   Abdominal:      General: There is no distension.      Palpations: There is no mass.      Tenderness: There is no abdominal tenderness.   Musculoskeletal:         General: No swelling, tenderness or deformity.      Cervical back: No tenderness.      Right lower leg: No edema.      Left lower leg: No edema.   Feet:      Right foot:      Skin integrity: No ulcer, skin breakdown, erythema, warmth, callus or dry skin.      Left foot:      Skin integrity: No ulcer, skin breakdown, erythema, warmth, callus or dry skin.   Lymphadenopathy:      Cervical: No cervical adenopathy.   Skin:     General: Skin is warm.      Capillary Refill: Capillary refill takes less than 2 seconds.   Neurological:      General: No focal deficit present.      Mental Status: He is alert and oriented to person, place, and time.         Patient's shoes and socks removed.    Right Foot/Ankle   Right Foot Inspection  Skin Exam: skin normal and skin intact. No dry skin, no warmth, no callus, no erythema, no maceration, no abnormal color, no pre-ulcer, no ulcer and no callus.     Toe Exam: ROM and strength within normal limits. "     Sensory   Vibration: intact  Monofilament testing: intact    Vascular  Capillary refills: < 3 seconds  The right DP pulse is 2+.     Left Foot/Ankle  Left Foot Inspection  Skin Exam: skin normal and skin intact. No dry skin, no warmth, no erythema, no maceration, normal color, no pre-ulcer, no ulcer and no callus.     Toe Exam: ROM and strength within normal limits.     Sensory   Vibration: intact  Monofilament testing: intact    Vascular  Capillary refills: < 3 seconds  The left DP pulse is 2+.     Assign Risk Category  No deformity present  No loss of protective sensation  No weak pulses  Risk: 0

## 2025-01-17 DIAGNOSIS — I10 ESSENTIAL HYPERTENSION: ICD-10-CM

## 2025-01-17 DIAGNOSIS — E78.2 MIXED HYPERLIPIDEMIA: ICD-10-CM

## 2025-01-17 DIAGNOSIS — E78.5 DYSLIPIDEMIA: ICD-10-CM

## 2025-01-17 DIAGNOSIS — I15.8 OTHER SECONDARY HYPERTENSION: ICD-10-CM

## 2025-01-20 RX ORDER — ATORVASTATIN CALCIUM 40 MG/1
40 TABLET, FILM COATED ORAL DAILY
Qty: 90 TABLET | Refills: 0 | Status: SHIPPED | OUTPATIENT
Start: 2025-01-20

## 2025-01-20 RX ORDER — LISINOPRIL 5 MG/1
5 TABLET ORAL DAILY
Qty: 90 TABLET | Refills: 0 | Status: SHIPPED | OUTPATIENT
Start: 2025-01-20

## 2025-01-20 RX ORDER — CLOPIDOGREL BISULFATE 75 MG/1
75 TABLET ORAL DAILY
Qty: 90 TABLET | Refills: 0 | Status: SHIPPED | OUTPATIENT
Start: 2025-01-20

## 2025-01-20 RX ORDER — METOPROLOL SUCCINATE 25 MG/1
25 TABLET, EXTENDED RELEASE ORAL DAILY
Qty: 90 TABLET | Refills: 0 | Status: SHIPPED | OUTPATIENT
Start: 2025-01-20

## 2025-02-04 ENCOUNTER — OFFICE VISIT (OUTPATIENT)
Dept: DERMATOLOGY | Facility: CLINIC | Age: 55
End: 2025-02-04
Payer: COMMERCIAL

## 2025-02-04 VITALS — WEIGHT: 216 LBS | TEMPERATURE: 97.9 F | BODY MASS INDEX: 29.26 KG/M2 | HEIGHT: 72 IN

## 2025-02-04 DIAGNOSIS — L85.3 XEROSIS OF SKIN: ICD-10-CM

## 2025-02-04 DIAGNOSIS — L82.1 SEBORRHEIC KERATOSIS: ICD-10-CM

## 2025-02-04 DIAGNOSIS — Z85.828 HISTORY OF BASAL CELL CARCINOMA: ICD-10-CM

## 2025-02-04 DIAGNOSIS — D18.01 CHERRY ANGIOMA: ICD-10-CM

## 2025-02-04 DIAGNOSIS — Z85.828 HISTORY OF SCC (SQUAMOUS CELL CARCINOMA) OF SKIN: ICD-10-CM

## 2025-02-04 DIAGNOSIS — L81.4 LENTIGO: Primary | ICD-10-CM

## 2025-02-04 DIAGNOSIS — D22.9 MULTIPLE MELANOCYTIC NEVI: ICD-10-CM

## 2025-02-04 PROCEDURE — 99213 OFFICE O/P EST LOW 20 MIN: CPT | Performed by: DERMATOLOGY

## 2025-02-04 NOTE — PATIENT INSTRUCTIONS
HISTORY OF BASAL CELL CARCINOMA    Assessment and Plan:  Based on a thorough discussion of this condition and the management approach to it (including a comprehensive discussion of the known risks, side effects and potential benefits of treatment), the patient (family) agrees to implement the following specific plan:  Monitor for any changes   Continue with routine skin checks   When outside we recommend using a wide brim hat, sunglasses, long sleeve and pants, sunscreen with SPF 30+ with reapplication every 2 hours, or SPF specific clothing      How can basal cell carcinoma be prevented?  The most important way to prevent BCC is to avoid sunburn. This is especially important in childhood and early life. Fair skinned individuals and those with a personal or family history of BCC should protect their skin from sun exposure daily, year-round and lifelong.  Stay indoors or under the shade in the middle of the day   Wear covering clothing   Apply high protection factor SPF50+ broad-spectrum sunscreens generously to exposed skin if outdoors   Avoid indoor tanning (sun beds, solaria)  Oral nicotinamide (vitamin B3) in a dose of 500 mg twice daily may reduce the number and severity of BCCs.    What is the outlook for basal cell carcinoma?  Most BCCs are cured by treatment. Cure is most likely if treatment is undertaken when the lesion is small.  About 50% of people with BCC develop a second one within 3 years of the first. They are also at increased risk of other skin cancers, especially melanoma. Regular self-skin examinations and long-term annual skin checks by an experienced health professional are recommended.       HISTORY OF SQUAMOUS CELL CARCINOMA     Assessment and Plan:  Based on a thorough discussion of this condition and the management approach to it (including a comprehensive discussion of the known risks, side effects and potential benefits of treatment), the patient (family) agrees to implement the following  "specific plan:  Monitor for any changes  Continue with routine skin checks  When outside we recommend using a wide brim hat, sunglasses, long sleeve and pants, sunscreen with SPF 30+ with reapplication every 2 hours, or SPF specific clothing      How can SCC be prevented?  The most important way to prevent SCC is to avoid sunburn. This is especially important in childhood and early life. Fair skinned individuals and those with a personal or family history of BCC should protect their skin from sun exposure daily, year-round and lifelong.  Stay indoors or under the shade in the middle of the day   Wear covering clothing   Apply high protection factor SPF50+ broad-spectrum sunscreens generously to exposed skin if outdoors   Avoid indoor tanning (sun beds, solaria)      What is the outlook for SCC?  Most SCC are cured by treatment. Cure is most likely if treatment is undertaken when the lesion is small. A small percent of SCC's can spread to lymph  nodes and long term monitoring is indicated.   They are also at increased risk of other skin cancers, especially melanoma. Regular self-skin examinations and long-term annual skin checks by an experienced health professional are recommended.     MELANOCYTIC NEVI (\"Moles\")    Assessment and Plan:  Based on a thorough discussion of this condition and the management approach to it (including a comprehensive discussion of the known risks, side effects and potential benefits of treatment), the patient (family) agrees to implement the following specific plan:  When outside we recommend using a wide brim hat, sunglasses, long sleeve and pants, sunscreen with SPF 30+ with reapplication every 2 hours, or SPF specific clothing   Benign, reassured  Annual skin check     Melanocytic Nevi  Melanocytic nevi (\"moles\") are tan or brown, raised or flat areas of the skin which have an increased number of melanocytes. Melanocytes are the cells in our body which make pigment and account for skin " "color.    Some moles are present at birth (I.e., \"congenital nevi\"), while others come up later in life (i.e., \"acquired nevi\").  The sun can stimulate the body to make more moles.  Sunburns are not the only thing that triggers more moles.  Chronic sun exposure can do it too.     Clinically distinguishing a healthy mole from melanoma may be difficult, even for experienced dermatologists. The \"ABCDE's\" of moles have been suggested as a means of helping to alert a person to a suspicious mole and the possible increased risk of melanoma.  The suggestions for raising alert are as follows:    Asymmetry: Healthy moles tend to be symmetric, while melanomas are often asymmetric.  Asymmetry means if you draw a line through the mole, the two halves do not match in color, size, shape, or surface texture. Asymmetry can be a result of rapid enlargement of a mole, the development of a raised area on a previously flat lesion, scaling, ulceration, bleeding or scabbing within the mole.  Any mole that starts to demonstrate \"asymmetry\" should be examined promptly by a board certified dermatologist.     Border: Healthy moles tend to have discrete, even borders.  The border of a melanoma often blends into the normal skin and does not sharply delineate the mole from normal skin.  Any mole that starts to demonstrate \"uneven borders\" should be examined promptly by a board certified dermatologist.     Color: Healthy moles tend to be one color throughout.  Melanomas tend to be made up of different colors ranging from dark black, blue, white, or red.  Any mole that demonstrates a color change should be examined promptly by a board certified dermatologist.     Diameter: Healthy moles tend to be smaller than 0.6 cm in size; an exception are \"congenital nevi\" that can be larger.  Melanomas tend to grow and can often be greater than 0.6 cm (1/4 of an inch, or the size of a pencil eraser). This is only a guideline, and many normal moles may be " "larger than 0.6 cm without being unhealthy.  Any mole that starts to change in size (small to bigger or bigger to smaller) should be examined promptly by a board certified dermatologist.     Evolving: Healthy moles tend to \"stay the same.\"  Melanomas may often show signs of change or evolution such as a change in size, shape, color, or elevation.  Any mole that starts to itch, bleed, crust, burn, hurt, or ulcerate or demonstrate a change or evolution should be examined promptly by a board certified dermatologist.        LENTIGO    Assessment and Plan:  Based on a thorough discussion of this condition and the management approach to it (including a comprehensive discussion of the known risks, side effects and potential benefits of treatment), the patient (family) agrees to implement the following specific plan:  When outside we recommend using a wide brim hat, sunglasses, long sleeve and pants, sunscreen with SPF 30+ with reapplication every 2 hours, or SPF specific clothing       What is a lentigo?  A lentigo is a pigmented flat or slightly raised lesion with a clearly defined edge. Unlike an ephelis (freckle), it does not fade in the winter months. There are several kinds of lentigo.  The name lentigo originally referred to its appearance resembling a small lentil. The plural of lentigo is lentigines, although “lentigos” is also in common use.    Who gets lentigines?  Lentigines can affect males and females of all ages and races. Solar lentigines are especially prevalent in fair skinned adults. Lentigines associated with syndromes are present at birth or arise during childhood.    What causes lentigines?  Common forms of lentigo are due to exposure to ultraviolet radiation:  Sun damage including sunburn   Indoor tanning   Phototherapy, especially photochemotherapy (PUVA)    Ionizing radiation, eg radiation therapy, can also cause lentigines.  Several familial syndromes associated with widespread lentigines originate " "from mutations in Juanjose-MAP kinase, mTOR signaling and PTEN pathways.    What is the treatment for lentigines?  Most lentigines are left alone. Attempts to lighten them may not be successful. The following approaches are used:  SPF 50+ broad-spectrum sunscreen   Hydroquinone bleaching cream   Alpha hydroxy acids   Vitamin C   Retinoids   Azelaic acid   Chemical peels  Individual lesions can be permanently removed using:  Cryotherapy   Intense pulsed light   Pigment lasers    How can lentigines be prevented?  Lentigines associated with exposure ultraviolet radiation can be prevented by very careful sun protection. Clothing is more successful at preventing new lentigines than are sunscreens.    What is the outlook for lentigines?  Lentigines usually persist. They may increase in number with age and sun exposure. Some in sun-protected sites may fade and disappear.    BRODERICK ANGIOMAS    Assessment and Plan:  Based on a thorough discussion of this condition and the management approach to it (including a comprehensive discussion of the known risks, side effects and potential benefits of treatment), the patient (family) agrees to implement the following specific plan:  Monitor for changes  Benign, reassured      Assessment and Plan:    Cherry angioma, also known as Kennedy de Manny spots, are benign vascular skin lesions. A \"cherry angioma\" is a firm red, blue or purple papule, 0.1-1 cm in diameter. When thrombosed, they can appear black in colour until evaluated with a dermatoscope when the red or purple colour is more easily seen. Cherry angioma may develop on any part of the body but most often appear on the scalp, face, lips and trunk.  An angioma is due to proliferating endothelial cells; these are the cells that line the inside of a blood vessel.    Angiomas can arise in early life or later in life; the most common type of angioma is a cherry angioma.  Cherry angiomas are very common in males and females of any age " "or race. They are more noticeable in white skin than in skin of colour. They markedly increase in number from about the age of 40. There may be a family history of similar lesions. Eruptive cherry angiomas have been rarely reported to be associated with internal malignancy. The cause of angiomas is unknown. Genetic analysis of cherry angiomas has shown that they frequently carry specific somatic missense mutations in the GNAQ and GNA11 (Q209H) genes, which are involved in other vascular and melanocytic proliferations.      SEBORRHEIC KERATOSIS; NON-INFLAMED    Assessment and Plan:  Based on a thorough discussion of this condition and the management approach to it (including a comprehensive discussion of the known risks, side effects and potential benefits of treatment), the patient (family) agrees to implement the following specific plan:  Monitor for changes  Benign, reassured      Seborrheic Keratosis  A seborrheic keratosis is a harmless warty spot that appears during adult life as a common sign of skin aging.  Seborrheic keratoses can arise on any area of skin, covered or uncovered, with the usual exception of the palms and soles. They do not arise from mucous membranes. Seborrheic keratoses can have highly variable appearance.      Seborrheic keratoses are extremely common. It has been estimated that over 90% of adults over the age of 60 years have one or more of them. They occur in males and females of all races, typically beginning to erupt in the 30s or 40s. They are uncommon under the age of 20 years.  The precise cause of seborrhoeic keratoses is not known.  Seborrhoeic keratoses are considered degenerative in nature. As time goes by, seborrheic keratoses tend to become more numerous. Some people inherit a tendency to develop a very large number of them; some people may have hundreds of them.      There is no easy way to remove multiple lesions on a single occasion.  Unless a specific lesion is \"inflamed\" " "and is causing pain or stinging/burning or is bleeding, most insurance companies do not authorize treatment.    XEROSIS (\"DRY SKIN\")    Assessment and Plan:  Based on a thorough discussion of this condition and the management approach to it (including a comprehensive discussion of the known risks, side effects and potential benefits of treatment), the patient (family) agrees to implement the following specific plan:  Use moisturizer like Eucerin,Cerave or Aveeno Cream 3 times a day for the dry skin            Dry skin refers to skin that feels dry to touch. Dry skin has a dull surface with a rough, scaly quality. The skin is less pliable and cracked. When dryness is severe, the skin may become inflamed and fissured.  Although any body site can be dry, dry skin tends to affect the shins more than any other site.    Dry skin is lacking moisture in the outer horny cell layer (stratum corneum) and this results in cracks in the skin surface.  Dry skin is also called xerosis, xeroderma or asteatosis (lack of fat).  It can affect males and females of all ages. There is some racial variability in water and lipid content of the skin.  Dry skin that starts in early childhood may be one of about 20 types of ichthyosis (fish-scale skin). There is often a family history of dry skin.   Dry skin is commonly seen in people with atopic dermatitis.  Nearly everyone > 60 years has dry skin.    Dry skin that begins later may be seen in people with certain diseases and conditions.  Postmenopausal women  Hypothyroidism  Chronic renal disease   Malnutrition and weight loss   Subclinical dermatitis   Treatment with certain drugs such as oral retinoids, diuretics and epidermal growth factor receptor inhibitors      What is the treatment for dry skin?  The mainstay of treatment of dry skin and ichthyosis is moisturisers/emollients. They should be applied liberally and often enough to:  Reduce itch   Improve the barrier function   Prevent " entry of irritants, bacteria   Reduce transepidermal water loss.      How can dry skin be prevented?  Eliminate aggravating factors:  Reduce the frequency of bathing.   A humidifier in winter and air conditioner in summer   Compare having a short shower with a prolonged soak in a bath.   Use lukewarm, not hot, water.   Replace standard soap with a substitute such as a synthetic detergent cleanser, water-miscible emollient, bath oil, anti-pruritic tar oil, colloidal oatmeal etc.   Apply an emollient liberally and often, particularly shortly after bathing, and when itchy. The drier the skin, the thicker this should be, especially on the hands.    What is the outlook for dry skin?  A tendency to dry skin may persist life-long, or it may improve once contributing factors are controlled.

## 2025-02-04 NOTE — PROGRESS NOTES
"[facility] Note     Patient Name: Darryl Ibanez  Encounter Date: 2/4/25     Have you been cared for by a [facility] Dermatologist in the last 3 years and, if so, which description applies to you?    Yes.  I have been here within the last 3 years, and my medical history has NOT changed since that time.  I am MALE/not capable of bearing children.    REVIEW OF SYSTEMS:  Have you recently had or currently have any of the following? No changes in my recent health.   PAST MEDICAL HISTORY:  Have you personally ever had or currently have any of the following?  If \"YES,\" then please provide more detail. No changes in my medical history.   HISTORY OF IMMUNOSUPPRESSION: Do you have a history of any of the following:  Systemic Immunosuppression such as Diabetes, Biologic or Immunotherapy, Chemotherapy, Organ Transplantation, Bone Marrow Transplantation or Prednisone? Yes, diabetes      Answering \"YES\" requires the addition of the dotphrase \"IMMUNOSUPPRESSED\" as the first diagnosis of the patient's visit.   FAMILY HISTORY:  Any \"first degree relatives\" (parent, brother, sister, or child) with the following?    No changes in my family's known health.   PATIENT EXPERIENCE:    Do you want the Dermatologist to perform a COMPLETE skin exam today including a clinical examination under the \"bra and underwear\" areas?  Yes, underneath underwear not examined today   If necessary, do we have your permission to call and leave a detailed message on your Preferred Phone number that includes your specific medical information?  Yes      Allergies   Allergen Reactions    Penicillins       Current Outpatient Medications:     aspirin 81 MG tablet, Take 1 tablet by mouth daily, Disp: , Rfl:     atorvastatin (LIPITOR) 40 mg tablet, TAKE ONE TABLET BY MOUTH ONCE DAILY., Disp: 90 tablet, Rfl: 0    clopidogrel (PLAVIX) 75 mg tablet, TAKE ONE TABLET BY MOUTH ONCE DAILY., Disp: 90 tablet, Rfl: 0    insulin degludec (Tresiba FlexTouch) " 200 units/mL CONCENTRATED U-200 injection pen, Inject 20 Units under the skin daily, Disp: 9 mL, Rfl: 1    Insulin Pen Needle (BD Pen Needle Kay U/F) 32G X 4 MM MISC, Use twice daily as directed with insulin pen, Disp: 200 each, Rfl: 3    lisinopril (ZESTRIL) 5 mg tablet, TAKE ONE TABLET BY MOUTH ONCE DAILY., Disp: 90 tablet, Rfl: 0    metFORMIN (GLUCOPHAGE-XR) 500 mg 24 hr tablet, Take 2 tablets (1,000 mg total) by mouth daily with breakfast, Disp: 180 tablet, Rfl: 1    metoprolol succinate (TOPROL-XL) 25 mg 24 hr tablet, TAKE ONE TABLET BY MOUTH ONCE DAILY., Disp: 90 tablet, Rfl: 0    OneTouch Delica Lancets 33G MISC, Check twice a day, Disp: 100 each, Rfl: 1    sildenafil (VIAGRA) 50 MG tablet, Take 1 tablet (50 mg total)by mouth daily as needed for erectile dysfunction, Disp: 10 tablet, Rfl: 0          Whom besides the patient is providing clinical information about today's encounter?   NO ADDITIONAL HISTORIAN (patient alone provided history)    Physical Exam and Assessment/Plan by Diagnosis:    HISTORY OF BASAL CELL CARCINOMA    Physical Exam:  Anatomic Location Affected:  records unavailable   Morphological Description of scar:  well healed   Suspected Recurrence: No  Pertinent Positives:  Pertinent Negatives:      Additional History of Present Condition:  History of basal cell carcinoma with no sign of recurrence. Records unavailable     Assessment and Plan:  Based on a thorough discussion of this condition and the management approach to it (including a comprehensive discussion of the known risks, side effects and potential benefits of treatment), the patient (family) agrees to implement the following specific plan:  Monitor for any changes   Continue with routine skin checks   When outside we recommend using a wide brim hat, sunglasses, long sleeve and pants, sunscreen with SPF 30+ with reapplication every 2 hours, or SPF specific clothing      How can basal cell carcinoma be prevented?  The most important  way to prevent BCC is to avoid sunburn. This is especially important in childhood and early life. Fair skinned individuals and those with a personal or family history of BCC should protect their skin from sun exposure daily, year-round and lifelong.  Stay indoors or under the shade in the middle of the day   Wear covering clothing   Apply high protection factor SPF50+ broad-spectrum sunscreens generously to exposed skin if outdoors   Avoid indoor tanning (sun beds, solaria)  Oral nicotinamide (vitamin B3) in a dose of 500 mg twice daily may reduce the number and severity of BCCs.    What is the outlook for basal cell carcinoma?  Most BCCs are cured by treatment. Cure is most likely if treatment is undertaken when the lesion is small.  About 50% of people with BCC develop a second one within 3 years of the first. They are also at increased risk of other skin cancers, especially melanoma. Regular self-skin examinations and long-term annual skin checks by an experienced health professional are recommended.     HISTORY OF SQUAMOUS CELL CARCINOMA     Physical Exam:  Anatomic Location Affected:  left temple(in situ)-1/2024  Morphological Description of Scar:  well healed  Suspected Recurrence: no  Regional adenopathy: no    Additional History of Present Condition:  Mohs sx done on left temple by dr. Schmid on 3/4/24.     Assessment and Plan:  Based on a thorough discussion of this condition and the management approach to it (including a comprehensive discussion of the known risks, side effects and potential benefits of treatment), the patient (family) agrees to implement the following specific plan:  Monitor for any changes  Continue with routine skin checks  When outside we recommend using a wide brim hat, sunglasses, long sleeve and pants, sunscreen with SPF 30+ with reapplication every 2 hours, or SPF specific clothing      How can SCC be prevented?  The most important way to prevent SCC is to avoid sunburn. This is  "especially important in childhood and early life. Fair skinned individuals and those with a personal or family history of BCC should protect their skin from sun exposure daily, year-round and lifelong.  Stay indoors or under the shade in the middle of the day   Wear covering clothing   Apply high protection factor SPF50+ broad-spectrum sunscreens generously to exposed skin if outdoors   Avoid indoor tanning (sun beds, solaria)      What is the outlook for SCC?  Most SCC are cured by treatment. Cure is most likely if treatment is undertaken when the lesion is small. A small percent of SCC's can spread to lymph  nodes and long term monitoring is indicated.   They are also at increased risk of other skin cancers, especially melanoma. Regular self-skin examinations and long-term annual skin checks by an experienced health professional are recommended.     MELANOCYTIC NEVI (\"Moles\")    Physical Exam:  Anatomic Location Affected:   Mostly on sun-exposed areas of the trunk and extremities  Morphological Description:  Scattered, 1-4mm round to ovoid, symmetrical-appearing, even bordered, skin colored to dark brown macules/papules, mostly in sun-exposed areas  Pertinent Positives:  Pertinent Negatives:    Additional History of Present Condition:      Assessment and Plan:  Based on a thorough discussion of this condition and the management approach to it (including a comprehensive discussion of the known risks, side effects and potential benefits of treatment), the patient (family) agrees to implement the following specific plan:  When outside we recommend using a wide brim hat, sunglasses, long sleeve and pants, sunscreen with SPF 30+ with reapplication every 2 hours, or SPF specific clothing   Benign, reassured  Annual skin check     Melanocytic Nevi  Melanocytic nevi (\"moles\") are tan or brown, raised or flat areas of the skin which have an increased number of melanocytes. Melanocytes are the cells in our body which make " "pigment and account for skin color.    Some moles are present at birth (I.e., \"congenital nevi\"), while others come up later in life (i.e., \"acquired nevi\").  The sun can stimulate the body to make more moles.  Sunburns are not the only thing that triggers more moles.  Chronic sun exposure can do it too.     Clinically distinguishing a healthy mole from melanoma may be difficult, even for experienced dermatologists. The \"ABCDE's\" of moles have been suggested as a means of helping to alert a person to a suspicious mole and the possible increased risk of melanoma.  The suggestions for raising alert are as follows:    Asymmetry: Healthy moles tend to be symmetric, while melanomas are often asymmetric.  Asymmetry means if you draw a line through the mole, the two halves do not match in color, size, shape, or surface texture. Asymmetry can be a result of rapid enlargement of a mole, the development of a raised area on a previously flat lesion, scaling, ulceration, bleeding or scabbing within the mole.  Any mole that starts to demonstrate \"asymmetry\" should be examined promptly by a board certified dermatologist.     Border: Healthy moles tend to have discrete, even borders.  The border of a melanoma often blends into the normal skin and does not sharply delineate the mole from normal skin.  Any mole that starts to demonstrate \"uneven borders\" should be examined promptly by a board certified dermatologist.     Color: Healthy moles tend to be one color throughout.  Melanomas tend to be made up of different colors ranging from dark black, blue, white, or red.  Any mole that demonstrates a color change should be examined promptly by a board certified dermatologist.     Diameter: Healthy moles tend to be smaller than 0.6 cm in size; an exception are \"congenital nevi\" that can be larger.  Melanomas tend to grow and can often be greater than 0.6 cm (1/4 of an inch, or the size of a pencil eraser). This is only a guideline, and " "many normal moles may be larger than 0.6 cm without being unhealthy.  Any mole that starts to change in size (small to bigger or bigger to smaller) should be examined promptly by a board certified dermatologist.     Evolving: Healthy moles tend to \"stay the same.\"  Melanomas may often show signs of change or evolution such as a change in size, shape, color, or elevation.  Any mole that starts to itch, bleed, crust, burn, hurt, or ulcerate or demonstrate a change or evolution should be examined promptly by a board certified dermatologist.        LENTIGO    Physical Exam:  Anatomic Location Affected:  trunk, arms  Morphological Description:  Light brown macules  Pertinent Positives:  Pertinent Negatives:    Additional History of Present Condition:      Assessment and Plan:  Based on a thorough discussion of this condition and the management approach to it (including a comprehensive discussion of the known risks, side effects and potential benefits of treatment), the patient (family) agrees to implement the following specific plan:  When outside we recommend using a wide brim hat, sunglasses, long sleeve and pants, sunscreen with SPF 30+ with reapplication every 2 hours, or SPF specific clothing       What is a lentigo?  A lentigo is a pigmented flat or slightly raised lesion with a clearly defined edge. Unlike an ephelis (freckle), it does not fade in the winter months. There are several kinds of lentigo.  The name lentigo originally referred to its appearance resembling a small lentil. The plural of lentigo is lentigines, although “lentigos” is also in common use.    Who gets lentigines?  Lentigines can affect males and females of all ages and races. Solar lentigines are especially prevalent in fair skinned adults. Lentigines associated with syndromes are present at birth or arise during childhood.    What causes lentigines?  Common forms of lentigo are due to exposure to ultraviolet radiation:  Sun damage including " "sunburn   Indoor tanning   Phototherapy, especially photochemotherapy (PUVA)    Ionizing radiation, eg radiation therapy, can also cause lentigines.  Several familial syndromes associated with widespread lentigines originate from mutations in Juanjose-MAP kinase, mTOR signaling and PTEN pathways.    What is the treatment for lentigines?  Most lentigines are left alone. Attempts to lighten them may not be successful. The following approaches are used:  SPF 50+ broad-spectrum sunscreen   Hydroquinone bleaching cream   Alpha hydroxy acids   Vitamin C   Retinoids   Azelaic acid   Chemical peels  Individual lesions can be permanently removed using:  Cryotherapy   Intense pulsed light   Pigment lasers    How can lentigines be prevented?  Lentigines associated with exposure ultraviolet radiation can be prevented by very careful sun protection. Clothing is more successful at preventing new lentigines than are sunscreens.    What is the outlook for lentigines?  Lentigines usually persist. They may increase in number with age and sun exposure. Some in sun-protected sites may fade and disappear.    BRODERICK ANGIOMAS    Physical Exam:  Anatomic Location Affected:  trunk  Morphological Description:  Scattered cherry red, 1-4 mm papules.  Pertinent Positives:  Pertinent Negatives:    Additional History of Present Condition:      Assessment and Plan:  Based on a thorough discussion of this condition and the management approach to it (including a comprehensive discussion of the known risks, side effects and potential benefits of treatment), the patient (family) agrees to implement the following specific plan:  Monitor for changes  Benign, reassured      Assessment and Plan:    Cherry angioma, also known as Kennedy de Manny spots, are benign vascular skin lesions. A \"cherry angioma\" is a firm red, blue or purple papule, 0.1-1 cm in diameter. When thrombosed, they can appear black in colour until evaluated with a dermatoscope when the red " "or purple colour is more easily seen. Cherry angioma may develop on any part of the body but most often appear on the scalp, face, lips and trunk.  An angioma is due to proliferating endothelial cells; these are the cells that line the inside of a blood vessel.    Angiomas can arise in early life or later in life; the most common type of angioma is a cherry angioma.  Cherry angiomas are very common in males and females of any age or race. They are more noticeable in white skin than in skin of colour. They markedly increase in number from about the age of 40. There may be a family history of similar lesions. Eruptive cherry angiomas have been rarely reported to be associated with internal malignancy. The cause of angiomas is unknown. Genetic analysis of cherry angiomas has shown that they frequently carry specific somatic missense mutations in the GNAQ and GNA11 (Q209H) genes, which are involved in other vascular and melanocytic proliferations.      SEBORRHEIC KERATOSIS; NON-INFLAMED    Physical Exam:  Anatomic Location Affected:  trunk  Morphological Description:  Flat and raised, waxy, smooth to warty textured, yellow to brownish-grey to dark brown to blackish, discrete, \"stuck-on\" appearing papules.  Pertinent Positives:  Pertinent Negatives:    Additional History of Present Condition:      Assessment and Plan:  Based on a thorough discussion of this condition and the management approach to it (including a comprehensive discussion of the known risks, side effects and potential benefits of treatment), the patient (family) agrees to implement the following specific plan:  Monitor for changes  Benign, reassured      Seborrheic Keratosis  A seborrheic keratosis is a harmless warty spot that appears during adult life as a common sign of skin aging.  Seborrheic keratoses can arise on any area of skin, covered or uncovered, with the usual exception of the palms and soles. They do not arise from mucous membranes. " "Seborrheic keratoses can have highly variable appearance.      Seborrheic keratoses are extremely common. It has been estimated that over 90% of adults over the age of 60 years have one or more of them. They occur in males and females of all races, typically beginning to erupt in the 30s or 40s. They are uncommon under the age of 20 years.  The precise cause of seborrhoeic keratoses is not known.  Seborrhoeic keratoses are considered degenerative in nature. As time goes by, seborrheic keratoses tend to become more numerous. Some people inherit a tendency to develop a very large number of them; some people may have hundreds of them.      There is no easy way to remove multiple lesions on a single occasion.  Unless a specific lesion is \"inflamed\" and is causing pain or stinging/burning or is bleeding, most insurance companies do not authorize treatment.    XEROSIS (\"DRY SKIN\")    Physical Exam:  Anatomic Location Affected:  diffuse  Morphological Description:  xerosis  Pertinent Positives:  Pertinent Negatives:    Additional History of Present Condition:      Assessment and Plan:  Based on a thorough discussion of this condition and the management approach to it (including a comprehensive discussion of the known risks, side effects and potential benefits of treatment), the patient (family) agrees to implement the following specific plan:  Use moisturizer like Eucerin,Cerave or Aveeno Cream 3 times a day for the dry skin            Dry skin refers to skin that feels dry to touch. Dry skin has a dull surface with a rough, scaly quality. The skin is less pliable and cracked. When dryness is severe, the skin may become inflamed and fissured.  Although any body site can be dry, dry skin tends to affect the shins more than any other site.    Dry skin is lacking moisture in the outer horny cell layer (stratum corneum) and this results in cracks in the skin surface.  Dry skin is also called xerosis, xeroderma or asteatosis " (lack of fat).  It can affect males and females of all ages. There is some racial variability in water and lipid content of the skin.  Dry skin that starts in early childhood may be one of about 20 types of ichthyosis (fish-scale skin). There is often a family history of dry skin.   Dry skin is commonly seen in people with atopic dermatitis.  Nearly everyone > 60 years has dry skin.    Dry skin that begins later may be seen in people with certain diseases and conditions.  Postmenopausal women  Hypothyroidism  Chronic renal disease   Malnutrition and weight loss   Subclinical dermatitis   Treatment with certain drugs such as oral retinoids, diuretics and epidermal growth factor receptor inhibitors      What is the treatment for dry skin?  The mainstay of treatment of dry skin and ichthyosis is moisturisers/emollients. They should be applied liberally and often enough to:  Reduce itch   Improve the barrier function   Prevent entry of irritants, bacteria   Reduce transepidermal water loss.      How can dry skin be prevented?  Eliminate aggravating factors:  Reduce the frequency of bathing.   A humidifier in winter and air conditioner in summer   Compare having a short shower with a prolonged soak in a bath.   Use lukewarm, not hot, water.   Replace standard soap with a substitute such as a synthetic detergent cleanser, water-miscible emollient, bath oil, anti-pruritic tar oil, colloidal oatmeal etc.   Apply an emollient liberally and often, particularly shortly after bathing, and when itchy. The drier the skin, the thicker this should be, especially on the hands.    What is the outlook for dry skin?  A tendency to dry skin may persist life-long, or it may improve once contributing factors are controlled.     Scribe Attestation      I,:  Saskia Lam MA am acting as a scribe while in the presence of the attending physician.:       I,:  Mirtha Schofield MD personally performed the services described in this documentation     as scribed in my presence.:

## 2025-02-06 NOTE — PROGRESS NOTES
Cardiology Follow Up    Darryl Ibanez  1970  [id number]  [facility]  [address]  [place] [id number] [phone]    Assessment & Plan  Coronary artery disease involving native coronary artery of native heart without angina pectoris   4/06/2014 PCI andAlpine expedition drug-eluting stent diag  to 90% first OM   4/06/14 left heart cath showed distal circumflex 70% stenosis, distal RCA 50% stenosis  Physically active.  Denies chest pain with activity  Continue on aspirin 81 mg daily, Lipitor 40 mg daily, Plavix 75 mg daily, lisinopril 5 mg daily, metoprolol succinate 25 mg daily  -Reinforced a heart healthy diet.  Continue with physical activity  Essential hypertension  RUE sitting 132/80  Continue on lisinopril 5 mg daily, metoprolol succinate 25 mg daily  DASH diet  Dyslipidemia  8/28/24  TG 93 HDL 50 LDL 56  LDL at goal   Continue on Lipitor 40 mg daily  I have reinforced a heart healthy diet  Type 2 diabetes mellitus with other specified complication, with long-term current use of insulin ([facility])    Lab Results   Component Value Date    HGBA1C 6.6 (H) 08/28/2024   Hemoglobin A1c controlled,  continue on current medical regimen.  Follow-up with PCP        Interval History:   Mr Darryl Ibanez presents to our office for a follow up visit.  He is feeling well without symptoms of dyspnea with minimal or moderate exertion, chest pain, palpitations lightheadedness or dizziness.  He is using free weights, the treadmill, and playing basketball at least 6 days a week.  Darryl admits to the need to improve his diet.      Medical History   Primary Cardiologist Dr Sánchez  NSTEMI, 4/06/2014 PCI andAlpine expedition drug-eluting stent diag  to 90% first OM   4/06/14 left heart cath showed distal circumflex 70% stenosis, distal RCA 50% stenosis  Hypertension  Dyslipidemia 8/28/24  TG 93 HDL 50 LDL 56  DM2 HgbA1C 8/28/24  hemoglobin A1c 6.6    5/29/14 TTE: LVEF 60% no regional wall motion abnormality.  Wall thickness increased.  Right ventricle size systolic function normal.  Left atrium mildly dilated.  Right atrium normal in size.  Mitral valve structure normal normal leaflet separation.  Aortic valve trileaflet normal thickness and normal cuspal separation.        Patient Active Problem List   Diagnosis    Coronary artery disease involving native coronary artery    Essential hypertension    Acute pain of left knee    Closed fracture of left patella    Chronic right shoulder pain    Type 2 diabetes mellitus with hyperglycemia, without long-term current use of insulin (HCC)    Hyperlipidemia    Vitamin D deficiency    Onychomycosis    Type 2 diabetes mellitus with diabetic polyneuropathy, without long-term current use of insulin (HCC)    Pain in joint of left shoulder    Type 2 diabetes mellitus without complication, with long-term current use of insulin (HCC)    Erectile dysfunction     Past Medical History:   Diagnosis Date    Allergic 1977    Penicillin    Basal cell carcinoma     Chest pain     Diabetes mellitus (HCC)     History of cardiac cath     Hypertension     Lyme disease     Squamous cell skin cancer 01/03/2024    Left [place]; MOHS 03/04/2024     Social History     Socioeconomic History    Marital status: /Civil Union     Spouse name: Not on file    Number of children: Not on file    Years of education: Not on file    Highest education level: Not on file   Occupational History    Not on file   Tobacco Use    Smoking status: Never     Passive exposure: Past    Smokeless tobacco: Current     Types: Chew   Vaping Use    Vaping status: Never Used   Substance and Sexual Activity    Alcohol use: Yes     Alcohol/week: 4.0 standard drinks of alcohol     Types: 4 Cans of beer per week     Comment: social    Drug use: No    Sexual activity: Yes     Partners: Female     Birth control/protection: Post-menopausal   Other  Topics Concern    Not on file   Social History Narrative    Not on file     Social Drivers of Health     Financial Resource Strain: Not on file   Food Insecurity: Not on file   Transportation Needs: Not on file   Physical Activity: Not on file   Stress: Not on file   Social Connections: Not on file   Intimate Partner Violence: Not on file   Housing Stability: Not on file      Family History   Problem Relation Age of Onset    Coronary artery disease Mother     Thyroid disease unspecified Mother     Coronary artery disease Father     Stroke Father     Cancer Sister     Coronary artery disease Paternal Grandfather      Past Surgical History:   Procedure Laterality Date    CARDIAC CATHETERIZATION Left     MOHS SURGERY Left 03/04/2024    SCCIS Left [place]; Dr. Schmid    NOSE SURGERY         Current Outpatient Medications:     aspirin 81 MG tablet, Take 1 tablet by mouth daily, Disp: , Rfl:     atorvastatin (LIPITOR) 40 mg tablet, TAKE ONE TABLET BY MOUTH ONCE DAILY., Disp: 90 tablet, Rfl: 0    clopidogrel (PLAVIX) 75 mg tablet, TAKE ONE TABLET BY MOUTH ONCE DAILY., Disp: 90 tablet, Rfl: 0    insulin degludec (Tresiba FlexTouch) 200 units/mL CONCENTRATED U-200 injection pen, Inject 20 Units under the skin daily, Disp: 9 mL, Rfl: 1    Insulin Pen Needle (BD Pen Needle Kay U/F) 32G X 4 MM MISC, Use twice daily as directed with insulin pen, Disp: 200 each, Rfl: 3    lisinopril (ZESTRIL) 5 mg tablet, TAKE ONE TABLET BY MOUTH ONCE DAILY., Disp: 90 tablet, Rfl: 0    metFORMIN (GLUCOPHAGE-XR) 500 mg 24 hr tablet, Take 2 tablets (1,000 mg total) by mouth daily with breakfast, Disp: 180 tablet, Rfl: 1    metoprolol succinate (TOPROL-XL) 25 mg 24 hr tablet, TAKE ONE TABLET BY MOUTH ONCE DAILY., Disp: 90 tablet, Rfl: 0    OneTouch Delica Lancets 33G MISC, Check twice a day, Disp: 100 each, Rfl: 1    sildenafil (VIAGRA) 50 MG tablet, Take 1 tablet (50 mg total)by mouth daily as needed for erectile dysfunction, Disp: 10 tablet, Rfl:  0  Allergies   Allergen Reactions    Penicillins        Labs:  No visits with results within 2 Month(s) from this visit.   Latest known visit with results is:   Appointment on 08/28/2024   Component Date Value    Hemoglobin A1C 08/28/2024 6.6 (H)     EAG 08/28/2024 143     Cholesterol 08/28/2024 125     Triglycerides 08/28/2024 93     HDL, Direct 08/28/2024 50     LDL Calculated 08/28/2024 56     Non-HDL-Chol (CHOL-HDL) 08/28/2024 75     Creatinine, Ur 08/28/2024 187.4     Albumin,U,Random 08/28/2024 <7.0     Albumin Creat Ratio 08/28/2024      WBC 08/28/2024 6.12     RBC 08/28/2024 5.08     Hemoglobin 08/28/2024 14.9     Hematocrit 08/28/2024 45.7     MCV 08/28/2024 90     MCH 08/28/2024 29.3     MCHC 08/28/2024 32.6     RDW 08/28/2024 13.0     Platelets 08/28/2024 234     MPV 08/28/2024 11.1      Imaging: No results found.    Review of Systems:  Review of Systems   All other systems reviewed and are negative.      Physical Exam:  Physical Exam  Vitals reviewed.   Constitutional:       Appearance: Normal appearance.   HENT:      Head: Normocephalic.   Cardiovascular:      Rate and Rhythm: Normal rate and regular rhythm.      Pulses: Normal pulses.      Heart sounds: Normal heart sounds.   Pulmonary:      Effort: Pulmonary effort is normal.      Breath sounds: Normal breath sounds.   Musculoskeletal:         General: Normal range of motion.      Cervical back: Normal range of motion and neck supple.      Right lower leg: No edema.      Left lower leg: No edema.   Skin:     General: Skin is warm and dry.      Capillary Refill: Capillary refill takes less than 2 seconds.   Neurological:      General: No focal deficit present.      Mental Status: He is alert and oriented to person, place, and time.   Psychiatric:         Mood and Affect: Mood normal.         Behavior: Behavior normal.

## 2025-02-10 ENCOUNTER — OFFICE VISIT (OUTPATIENT)
Dept: CARDIOLOGY CLINIC | Facility: CLINIC | Age: 55
End: 2025-02-10
Payer: COMMERCIAL

## 2025-02-10 VITALS
OXYGEN SATURATION: 96 % | BODY MASS INDEX: 28.33 KG/M2 | HEIGHT: 72 IN | WEIGHT: 209.2 LBS | HEART RATE: 86 BPM | SYSTOLIC BLOOD PRESSURE: 134 MMHG | DIASTOLIC BLOOD PRESSURE: 80 MMHG

## 2025-02-10 DIAGNOSIS — E78.5 DYSLIPIDEMIA: ICD-10-CM

## 2025-02-10 DIAGNOSIS — I10 ESSENTIAL HYPERTENSION: ICD-10-CM

## 2025-02-10 DIAGNOSIS — Z79.4 TYPE 2 DIABETES MELLITUS WITH OTHER SPECIFIED COMPLICATION, WITH LONG-TERM CURRENT USE OF INSULIN (HCC): ICD-10-CM

## 2025-02-10 DIAGNOSIS — E11.69 TYPE 2 DIABETES MELLITUS WITH OTHER SPECIFIED COMPLICATION, WITH LONG-TERM CURRENT USE OF INSULIN (HCC): ICD-10-CM

## 2025-02-10 DIAGNOSIS — I25.10 CORONARY ARTERY DISEASE INVOLVING NATIVE CORONARY ARTERY OF NATIVE HEART WITHOUT ANGINA PECTORIS: Primary | ICD-10-CM

## 2025-02-10 PROCEDURE — 99214 OFFICE O/P EST MOD 30 MIN: CPT | Performed by: NURSE PRACTITIONER

## 2025-02-10 PROCEDURE — 93000 ELECTROCARDIOGRAM COMPLETE: CPT | Performed by: NURSE PRACTITIONER

## 2025-02-10 RX ORDER — LISINOPRIL 5 MG/1
5 TABLET ORAL DAILY
Qty: 90 TABLET | Refills: 3 | Status: SHIPPED | OUTPATIENT
Start: 2025-02-10

## 2025-02-10 RX ORDER — ATORVASTATIN CALCIUM 40 MG/1
40 TABLET, FILM COATED ORAL DAILY
Qty: 90 TABLET | Refills: 3 | Status: SHIPPED | OUTPATIENT
Start: 2025-02-10

## 2025-02-10 RX ORDER — CLOPIDOGREL BISULFATE 75 MG/1
75 TABLET ORAL DAILY
Qty: 90 TABLET | Refills: 3 | Status: SHIPPED | OUTPATIENT
Start: 2025-02-10

## 2025-02-10 RX ORDER — METOPROLOL SUCCINATE 25 MG/1
25 TABLET, EXTENDED RELEASE ORAL DAILY
Qty: 90 TABLET | Refills: 3 | Status: SHIPPED | OUTPATIENT
Start: 2025-02-10

## 2025-02-10 NOTE — ASSESSMENT & PLAN NOTE
Lab Results   Component Value Date    HGBA1C 6.6 (H) 08/28/2024   Hemoglobin A1c controlled,  continue on current medical regimen.  Follow-up with PCP

## 2025-02-10 NOTE — ASSESSMENT & PLAN NOTE
4/06/2014 PCI andAlpine expedition drug-eluting stent diag  to 90% first OM   4/06/14 left heart cath showed distal circumflex 70% stenosis, distal RCA 50% stenosis  Physically active.  Denies chest pain with activity  Continue on aspirin 81 mg daily, Lipitor 40 mg daily, Plavix 75 mg daily, lisinopril 5 mg daily, metoprolol succinate 25 mg daily  -Reinforced a heart healthy diet.  Continue with physical activity

## 2025-02-10 NOTE — ASSESSMENT & PLAN NOTE
REBECCA sitting 132/80  Continue on lisinopril 5 mg daily, metoprolol succinate 25 mg daily  DASH diet

## 2025-02-24 LAB
LEFT EYE DIABETIC RETINOPATHY: NORMAL
RIGHT EYE DIABETIC RETINOPATHY: NORMAL

## 2025-02-27 DIAGNOSIS — Z79.4 TYPE 2 DIABETES MELLITUS WITHOUT COMPLICATION, WITH LONG-TERM CURRENT USE OF INSULIN (HCC): ICD-10-CM

## 2025-02-27 DIAGNOSIS — E11.9 TYPE 2 DIABETES MELLITUS WITHOUT COMPLICATION, WITH LONG-TERM CURRENT USE OF INSULIN (HCC): ICD-10-CM

## 2025-02-27 RX ORDER — METFORMIN HYDROCHLORIDE 500 MG/1
1000 TABLET, EXTENDED RELEASE ORAL
Qty: 180 TABLET | Refills: 1 | Status: SHIPPED | OUTPATIENT
Start: 2025-02-27

## 2025-03-03 DIAGNOSIS — E11.9 TYPE 2 DIABETES MELLITUS WITHOUT COMPLICATION, WITH LONG-TERM CURRENT USE OF INSULIN (HCC): ICD-10-CM

## 2025-03-03 DIAGNOSIS — Z79.4 TYPE 2 DIABETES MELLITUS WITHOUT COMPLICATION, WITH LONG-TERM CURRENT USE OF INSULIN (HCC): ICD-10-CM

## 2025-03-04 RX ORDER — PEN NEEDLE, DIABETIC 32GX 5/32"
NEEDLE, DISPOSABLE MISCELLANEOUS
Qty: 200 EACH | Refills: 1 | Status: SHIPPED | OUTPATIENT
Start: 2025-03-04

## 2025-03-18 DIAGNOSIS — N52.9 ERECTILE DYSFUNCTION, UNSPECIFIED ERECTILE DYSFUNCTION TYPE: ICD-10-CM

## 2025-03-19 RX ORDER — SILDENAFIL 50 MG/1
50 TABLET, FILM COATED ORAL DAILY PRN
Qty: 10 TABLET | Refills: 1 | Status: SHIPPED | OUTPATIENT
Start: 2025-03-19

## 2025-05-08 ENCOUNTER — TELEPHONE (OUTPATIENT)
Dept: CARDIOLOGY CLINIC | Facility: CLINIC | Age: 55
End: 2025-05-08

## 2025-05-08 NOTE — TELEPHONE ENCOUNTER
Patient called requesting refills for atorvastatin (LIPITOR), clopidogrel (PLAVIX), lisinopril (ZESTRIL) and metoprolol succinate (TOPROL-XL). Patient made aware medications were refilled on 2/10 for 90 tablets with 3 refills to [facility]. Patient instructed to contact the pharmacy and speak with someone directly to obtain refills of medications. Patient advised to call back for refill if their pharmacy is unable to assist them. Patient verbalized understanding.